# Patient Record
Sex: MALE | Race: WHITE | NOT HISPANIC OR LATINO | Employment: FULL TIME | URBAN - METROPOLITAN AREA
[De-identification: names, ages, dates, MRNs, and addresses within clinical notes are randomized per-mention and may not be internally consistent; named-entity substitution may affect disease eponyms.]

---

## 2018-01-17 ENCOUNTER — OFFICE VISIT (OUTPATIENT)
Dept: URGENT CARE | Facility: CLINIC | Age: 28
End: 2018-01-17
Payer: COMMERCIAL

## 2018-01-17 PROCEDURE — 99203 OFFICE O/P NEW LOW 30 MIN: CPT

## 2018-01-19 NOTE — PROGRESS NOTES
Assessment   1  Cellulitis of face (682 0) (L03 211)    Plan   Cellulitis of face    · Amoxicillin-Pot Clavulanate 875-125 MG Oral Tablet; TAKE 1 TABLET EVERY 12    HOURS UNTIL GONE    Discussion/Summary   Discussion Summary:    Take antibiotics as prescribed  apply Benadryl cream or take Benadryl tablets as needed for itching  application of Aquaphor as you have been doing  try applying thin layer or hydrocortisone cream if no improvement  up for new or worsening symptoms, particularly vision changes or spreading rash  Medication Side Effects Reviewed: Possible side effects of new medications were reviewed with the patient/guardian today  Understands and agrees with treatment plan: The treatment plan was reviewed with the patient/guardian  The patient/guardian understands and agrees with the treatment plan    Counseling Documentation With Imm: The patient was counseled regarding instructions for management,-- prognosis,-- patient and family education,-- impressions  Follow Up Instructions: Follow Up with your Primary Care Provider in 3-4 days  If your symptoms worsen, go to the nearest [place] [address] Emergency Department  Chief Complaint   1  Rash  Chief Complaint Free Text Note Form: Pt reports 4 days of a rash that started on his right ear  The rash spread to his left ear and face  He reports the rash itches  He has been applying lotion  History of Present Illness   HPI: Well appearing 23 yo male presents with complaint of rash on bilateral ears and face x 5 days  Pt reports it started on left ear with redness and swelling  Redness has spread across face and forehead, and his left ear is now involved  Pt denies any new soaps, shampoos, or lotions  Denies any change in environment or food  Pt complains of itching and soreness  Denies fever, chills, body aches, vision or hearing changes  Denies inner ear pain, but has had nasal and sinus congestion with scratchy throat   Pt reports some serous drainage after scratching ear  Hospital Based Practices Required Assessment:      Pain Assessment      the patient states they do not have pain  Abuse And Domestic Violence Screen   Domestic violence screen not done today  Reason DV Screen not done: family present       Depression And Suicide Screen  Suicide screen not done today  -- Reason suicide screen not done: family present  Prefered Language is  [place]  Primary Language is  English  Review of Systems   Focused-Male:      Constitutional: no fever or chills, feels well, no tiredness, no recent weight loss or weight gain  ENT: no complaints of earache, no loss of hearing, no nosebleeds or nasal discharge, no sore throat or hoarseness  Cardiovascular: no complaints of slow or fast heart rate, no chest pain, no palpitations, no leg claudication or lower extremity edema  Respiratory: no complaints of shortness of breath, no wheezing or cough, no dyspnea on exertion, no orthopnea or PND  Gastrointestinal: no complaints of abdominal pain, no constipation, no nausea or vomiting, no diarrhea or bloody stools  Musculoskeletal: no complaints of arthralgia, no myalgia, no joint swelling or stiffness, no limb pain or swelling  Integumentary: as noted in HPI  Neurological: no complaints of headache, no confusion, no numbness or tingling, no dizziness or fainting  Past Medical History   1  History of Denial (799 29) (R45 89)    Social History    · Smoker (305 1) (F17 200)    Current Meds    1  No Reported Medications Recorded    Allergies   1   No Known Drug Allergies    Vitals   Signs   Recorded: [id number] 06:56PM   Temperature: 98 9 F  Heart Rate: 90  Respiration: 18  Systolic: 635  Diastolic: 86  Height: 6 ft 2 in  Weight: 406 lb   BMI Calculated: 52 13  BSA Calculated: 2 94  O2 Saturation: 98    Physical Exam        Constitutional      General appearance: No acute distress, well appearing and well nourished  Eyes      Conjunctiva and lids: No swelling, erythema, or discharge  Pupils and irises: Equal, round and reactive to light  Ears, Nose, Mouth, and Throat      External inspection of ears and nose: Abnormal   The right external ear was erythematous, swollen and tender  The left external ear was erythematous, swollen and tender  The right mastoid was normal  The left mastoid was normal  External nose exam: normal      Otoscopic examination: Tympanic membrance translucent with normal light reflex  Canals patent without erythema  Nasal mucosa, septum, and turbinates: Normal without edema or erythema  Oropharynx: Normal with no erythema, edema, exudate or lesions  Pulmonary      Respiratory effort: No increased work of breathing or signs of respiratory distress  Auscultation of lungs: Clear to auscultation  Cardiovascular      Auscultation of heart: Normal rate and rhythm, normal S1 and S2, without murmurs  Skin      Skin and subcutaneous tissue: Normal without rashes or lesions  Examination of the skin for lesions: Abnormal  -- erythematous rash on forehead and face, stopping at beard        Signatures    Electronically signed by : [facility] ; Jan 17 2018  7:20PM EST                       (Author)     Electronically signed by : Leighton Hollingsworth DO; Jan 18 2018  6:43PM EST                       (Co-author)

## 2018-01-23 VITALS
WEIGHT: 315 LBS | HEART RATE: 90 BPM | OXYGEN SATURATION: 98 % | SYSTOLIC BLOOD PRESSURE: 140 MMHG | DIASTOLIC BLOOD PRESSURE: 86 MMHG | RESPIRATION RATE: 18 BRPM | HEIGHT: 74 IN | TEMPERATURE: 98.9 F | BODY MASS INDEX: 40.43 KG/M2

## 2018-12-14 ENCOUNTER — OFFICE VISIT (OUTPATIENT)
Dept: URGENT CARE | Facility: CLINIC | Age: 28
End: 2018-12-14
Payer: COMMERCIAL

## 2018-12-14 VITALS
SYSTOLIC BLOOD PRESSURE: 146 MMHG | WEIGHT: 315 LBS | RESPIRATION RATE: 16 BRPM | BODY MASS INDEX: 40.43 KG/M2 | HEIGHT: 74 IN | DIASTOLIC BLOOD PRESSURE: 82 MMHG | TEMPERATURE: 97 F | OXYGEN SATURATION: 94 % | HEART RATE: 98 BPM

## 2018-12-14 DIAGNOSIS — J20.9 ACUTE BRONCHITIS, UNSPECIFIED ORGANISM: Primary | ICD-10-CM

## 2018-12-14 PROCEDURE — 99213 OFFICE O/P EST LOW 20 MIN: CPT | Performed by: PREVENTIVE MEDICINE

## 2018-12-14 RX ORDER — ALBUTEROL SULFATE 90 UG/1
2 AEROSOL, METERED RESPIRATORY (INHALATION) EVERY 6 HOURS PRN
Qty: 18 G | Refills: 0 | Status: SHIPPED | OUTPATIENT
Start: 2018-12-14 | End: 2020-11-24 | Stop reason: ALTCHOICE

## 2018-12-14 RX ORDER — AZITHROMYCIN 250 MG/1
TABLET, FILM COATED ORAL
Qty: 6 TABLET | Refills: 0 | Status: SHIPPED | OUTPATIENT
Start: 2018-12-14 | End: 2018-12-18

## 2018-12-14 RX ORDER — PROMETHAZINE HYDROCHLORIDE AND CODEINE PHOSPHATE 6.25; 1 MG/5ML; MG/5ML
5 SYRUP ORAL EVERY 4 HOURS PRN
Qty: 120 ML | Refills: 0 | Status: SHIPPED | OUTPATIENT
Start: 2018-12-14 | End: 2020-11-24 | Stop reason: ALTCHOICE

## 2018-12-14 NOTE — PATIENT INSTRUCTIONS
Increase fluid intake  Motrin for fever chills or aches  You can supplement with DayQuil and NyQuil to help relieve symptoms  Codeine cough medicine at night  Recheck 3-5 days if no improvement

## 2018-12-14 NOTE — PROGRESS NOTES
[facility]        NAME: Padma Ferreira is a 34 y o  male  : 1990    MRN: [id number]  DATE: 2018  TIME: 5:53 PM    Assessment and Plan   Acute bronchitis, unspecified organism [J20 9]  1  Acute bronchitis, unspecified organism  azithromycin (ZITHROMAX) 250 mg tablet    albuterol (VENTOLIN HFA) 90 mcg/act inhaler    promethazine-codeine (PHENERGAN WITH CODEINE) 6 25-10 mg/5 mL syrup         Patient Instructions       Follow up with PCP in 3-5 days  Proceed to  ER if symptoms worsen  Chief Complaint     Chief Complaint   Patient presents with    Cough     Began 2-3 weeks ago  productive cough with pain  History of Present Illness       Two and half weeks of head cold type symptoms with increasing cough productive yellow-green sputum  Occasional wheezing  Note, he is a smoker  Review of Systems   Review of Systems   HENT: Positive for congestion  Respiratory: Positive for cough and wheezing  Current Medications       Current Outpatient Prescriptions:     albuterol (VENTOLIN HFA) 90 mcg/act inhaler, Inhale 2 puffs every 6 (six) hours as needed for wheezing, Disp: 18 g, Rfl: 0    azithromycin (ZITHROMAX) 250 mg tablet, Take 2 tablets today then 1 tablet daily x 4 days, Disp: 6 tablet, Rfl: 0    promethazine-codeine (PHENERGAN WITH CODEINE) 6 25-10 mg/5 mL syrup, Take 5 mL by mouth every 4 (four) hours as needed for cough, Disp: 120 mL, Rfl: 0    Current Allergies     Allergies as of 2018    (No Known Allergies)            The following portions of the patient's history were reviewed and updated as appropriate: allergies, current medications, past family history, past medical history, past social history, past surgical history and problem list      No past medical history on file  No past surgical history on file  No family history on file  Medications have been verified          Objective   /82   Pulse 98   Temp (!) 97 °F (36 1 °C)   Resp 16   Ht 6' 2" (1 88 m)   Wt (!) 184 kg (405 lb)   SpO2 94%   BMI 52 00 kg/m²        Physical Exam     Physical Exam   HENT:   Right Ear: External ear normal    Left Ear: External ear normal    Mouth/Throat: Oropharynx is clear and moist    Cardiovascular: Normal heart sounds  Pulmonary/Chest:   No rhonchi or rales but occasionally and inspiratory wheezes over the upper segments of the lower lobes  Lymphadenopathy:     He has no cervical adenopathy

## 2018-12-22 ENCOUNTER — OFFICE VISIT (OUTPATIENT)
Dept: URGENT CARE | Facility: CLINIC | Age: 28
End: 2018-12-22
Payer: COMMERCIAL

## 2018-12-22 VITALS
WEIGHT: 315 LBS | DIASTOLIC BLOOD PRESSURE: 82 MMHG | SYSTOLIC BLOOD PRESSURE: 146 MMHG | HEIGHT: 74 IN | RESPIRATION RATE: 16 BRPM | OXYGEN SATURATION: 93 % | TEMPERATURE: 97.1 F | HEART RATE: 110 BPM | BODY MASS INDEX: 40.43 KG/M2

## 2018-12-22 DIAGNOSIS — H66.91 RIGHT OTITIS MEDIA, UNSPECIFIED OTITIS MEDIA TYPE: Primary | ICD-10-CM

## 2018-12-22 PROCEDURE — 99213 OFFICE O/P EST LOW 20 MIN: CPT | Performed by: EMERGENCY MEDICINE

## 2018-12-22 RX ORDER — AMOXICILLIN 875 MG/1
875 TABLET, COATED ORAL 2 TIMES DAILY
Qty: 20 TABLET | Refills: 0 | Status: SHIPPED | OUTPATIENT
Start: 2018-12-22 | End: 2019-01-01

## 2018-12-22 NOTE — PROGRESS NOTES
Assessment/Plan:    No problem-specific Assessment & Plan notes found for this encounter  Diagnoses and all orders for this visit:    Right otitis media, unspecified otitis media type  -     amoxicillin (AMOXIL) 875 mg tablet; Take 1 tablet (875 mg total) by mouth 2 (two) times a day for 10 days          Subjective:      Patient ID: Bhavya Quinn is a 34 y o  male  Pt feels R ear is blocked; hearing dull      Earache    There is pain in the right ear  This is a new problem  The current episode started today  The problem occurs constantly  The problem has been unchanged  There has been no fever  The pain is at a severity of 3/10  The pain is mild  He has tried nothing for the symptoms  The treatment provided no relief  The following portions of the patient's history were reviewed and updated as appropriate: current medications, past family history, past medical history, past social history, past surgical history and problem list     Review of Systems   HENT: Positive for ear pain  All other systems reviewed and are negative  Objective:      /82   Pulse (!) 110   Temp (!) 97 1 °F (36 2 °C)   Resp 16   Ht 6' 2" (1 88 m)   Wt (!) 184 kg (405 lb)   SpO2 93%   BMI 52 00 kg/m²          Physical Exam   Constitutional: He is oriented to person, place, and time  He appears well-developed and well-nourished  HENT:   Left Ear: External ear normal    Nose: Nose normal    R TM red, swollen   Eyes: Pupils are equal, round, and reactive to light  Neck: Normal range of motion  Cardiovascular: Normal rate  Pulmonary/Chest: Effort normal    Abdominal: Soft  Neurological: He is alert and oriented to person, place, and time  Skin: Skin is warm and dry  Psychiatric: He has a normal mood and affect  His behavior is normal  Judgment and thought content normal    Nursing note and vitals reviewed

## 2018-12-22 NOTE — PATIENT INSTRUCTIONS
Amoxicillin twice a day, Tylenol or ibuprofen for pain or fever, recheck 10 days or sooner if necessary

## 2020-11-24 ENCOUNTER — OFFICE VISIT (OUTPATIENT)
Dept: FAMILY MEDICINE CLINIC | Facility: CLINIC | Age: 30
End: 2020-11-24
Payer: COMMERCIAL

## 2020-11-24 VITALS
SYSTOLIC BLOOD PRESSURE: 140 MMHG | HEIGHT: 74 IN | RESPIRATION RATE: 20 BRPM | HEART RATE: 96 BPM | TEMPERATURE: 97.1 F | OXYGEN SATURATION: 94 % | DIASTOLIC BLOOD PRESSURE: 90 MMHG | WEIGHT: 315 LBS | BODY MASS INDEX: 40.43 KG/M2

## 2020-11-24 DIAGNOSIS — E66.01 CLASS 3 SEVERE OBESITY DUE TO EXCESS CALORIES WITHOUT SERIOUS COMORBIDITY WITH BODY MASS INDEX (BMI) OF 50.0 TO 59.9 IN ADULT (HCC): ICD-10-CM

## 2020-11-24 DIAGNOSIS — E66.01 MORBID OBESITY WITH BMI OF 50.0-59.9, ADULT (HCC): ICD-10-CM

## 2020-11-24 DIAGNOSIS — Z13.6 SCREENING FOR CARDIOVASCULAR CONDITION: ICD-10-CM

## 2020-11-24 DIAGNOSIS — Z11.4 ENCOUNTER FOR SCREENING FOR HIV: ICD-10-CM

## 2020-11-24 DIAGNOSIS — R63.1 INCREASED THIRST: ICD-10-CM

## 2020-11-24 DIAGNOSIS — Z00.00 ANNUAL PHYSICAL EXAM: Primary | ICD-10-CM

## 2020-11-24 PROCEDURE — 3725F SCREEN DEPRESSION PERFORMED: CPT | Performed by: NURSE PRACTITIONER

## 2020-11-24 PROCEDURE — 4004F PT TOBACCO SCREEN RCVD TLK: CPT | Performed by: NURSE PRACTITIONER

## 2020-11-24 PROCEDURE — 99385 PREV VISIT NEW AGE 18-39: CPT | Performed by: NURSE PRACTITIONER

## 2020-11-24 PROCEDURE — 3008F BODY MASS INDEX DOCD: CPT | Performed by: NURSE PRACTITIONER

## 2020-12-11 LAB
ALBUMIN SERPL-MCNC: 4.5 G/DL (ref 4.1–5.2)
ALBUMIN/GLOB SERPL: 1.7 {RATIO} (ref 1.2–2.2)
ALP SERPL-CCNC: 89 IU/L (ref 39–117)
ALT SERPL-CCNC: 55 IU/L (ref 0–44)
APPEARANCE UR: CLEAR
AST SERPL-CCNC: 28 IU/L (ref 0–40)
BACTERIA URNS QL MICRO: NORMAL
BASOPHILS # BLD AUTO: 0 X10E3/UL (ref 0–0.2)
BASOPHILS NFR BLD AUTO: 1 %
BILIRUB SERPL-MCNC: 0.5 MG/DL (ref 0–1.2)
BILIRUB UR QL STRIP: NEGATIVE
BUN SERPL-MCNC: 17 MG/DL (ref 6–20)
BUN/CREAT SERPL: 17 (ref 9–20)
CALCIUM SERPL-MCNC: 9.5 MG/DL (ref 8.7–10.2)
CHLORIDE SERPL-SCNC: 97 MMOL/L (ref 96–106)
CHOLEST SERPL-MCNC: 206 MG/DL (ref 100–199)
CHOLEST/HDLC SERPL: 9 RATIO (ref 0–5)
CO2 SERPL-SCNC: 24 MMOL/L (ref 20–29)
COLOR UR: YELLOW
CREAT SERPL-MCNC: 0.98 MG/DL (ref 0.76–1.27)
EOSINOPHIL # BLD AUTO: 0.1 X10E3/UL (ref 0–0.4)
EOSINOPHIL NFR BLD AUTO: 2 %
EPI CELLS #/AREA URNS HPF: NORMAL /HPF (ref 0–10)
ERYTHROCYTE [DISTWIDTH] IN BLOOD BY AUTOMATED COUNT: 13.5 % (ref 11.6–15.4)
EST. AVERAGE GLUCOSE BLD GHB EST-MCNC: 329 MG/DL
GLOBULIN SER-MCNC: 2.6 G/DL (ref 1.5–4.5)
GLUCOSE SERPL-MCNC: 344 MG/DL (ref 65–99)
GLUCOSE UR QL: ABNORMAL
HBA1C MFR BLD: 13.1 % (ref 4.8–5.6)
HCT VFR BLD AUTO: 49.4 % (ref 37.5–51)
HDLC SERPL-MCNC: 23 MG/DL
HGB BLD-MCNC: 16.6 G/DL (ref 13–17.7)
HGB UR QL STRIP: NEGATIVE
HIV 1+2 AB+HIV1 P24 AG SERPL QL IA: NON REACTIVE
IMM GRANULOCYTES # BLD: 0.1 X10E3/UL (ref 0–0.1)
IMM GRANULOCYTES NFR BLD: 1 %
KETONES UR QL STRIP: ABNORMAL
LDLC SERPL CALC-MCNC: 78 MG/DL (ref 0–99)
LDLC SERPL DIRECT ASSAY-MCNC: 83 MG/DL (ref 0–99)
LEUKOCYTE ESTERASE UR QL STRIP: NEGATIVE
LYMPHOCYTES # BLD AUTO: 2 X10E3/UL (ref 0.7–3.1)
LYMPHOCYTES NFR BLD AUTO: 32 %
MCH RBC QN AUTO: 27.8 PG (ref 26.6–33)
MCHC RBC AUTO-ENTMCNC: 33.6 G/DL (ref 31.5–35.7)
MCV RBC AUTO: 83 FL (ref 79–97)
MICRO URNS: ABNORMAL
MONOCYTES # BLD AUTO: 0.4 X10E3/UL (ref 0.1–0.9)
MONOCYTES NFR BLD AUTO: 7 %
MUCOUS THREADS URNS QL MICRO: PRESENT
NEUTROPHILS # BLD AUTO: 3.8 X10E3/UL (ref 1.4–7)
NEUTROPHILS NFR BLD AUTO: 57 %
NITRITE UR QL STRIP: NEGATIVE
PH UR STRIP: 5 [PH] (ref 5–7.5)
PLATELET # BLD AUTO: 192 X10E3/UL (ref 150–450)
POTASSIUM SERPL-SCNC: 4.1 MMOL/L (ref 3.5–5.2)
PROT SERPL-MCNC: 7.1 G/DL (ref 6–8.5)
PROT UR QL STRIP: ABNORMAL
RBC # BLD AUTO: 5.97 X10E6/UL (ref 4.14–5.8)
RBC #/AREA URNS HPF: NORMAL /HPF (ref 0–2)
SL AMB EGFR AFRICAN AMERICAN: 121 ML/MIN/1.73
SL AMB EGFR NON AFRICAN AMERICAN: 104 ML/MIN/1.73
SL AMB VLDL CHOLESTEROL CALC: 105 MG/DL (ref 5–40)
SODIUM SERPL-SCNC: 136 MMOL/L (ref 134–144)
SP GR UR: >=1.03 (ref 1–1.03)
TRIGL SERPL-MCNC: 658 MG/DL (ref 0–149)
TSH SERPL DL<=0.005 MIU/L-ACNC: 1.34 UIU/ML (ref 0.45–4.5)
UROBILINOGEN UR STRIP-ACNC: 0.2 MG/DL (ref 0.2–1)
WBC # BLD AUTO: 6.4 X10E3/UL (ref 3.4–10.8)
WBC #/AREA URNS HPF: NORMAL /HPF (ref 0–5)

## 2020-12-11 PROCEDURE — 3046F HEMOGLOBIN A1C LEVEL >9.0%: CPT | Performed by: NURSE PRACTITIONER

## 2020-12-15 ENCOUNTER — OFFICE VISIT (OUTPATIENT)
Dept: FAMILY MEDICINE CLINIC | Facility: CLINIC | Age: 30
End: 2020-12-15
Payer: COMMERCIAL

## 2020-12-15 VITALS
OXYGEN SATURATION: 95 % | HEART RATE: 94 BPM | DIASTOLIC BLOOD PRESSURE: 98 MMHG | SYSTOLIC BLOOD PRESSURE: 150 MMHG | TEMPERATURE: 97 F

## 2020-12-15 DIAGNOSIS — I10 ESSENTIAL HYPERTENSION: ICD-10-CM

## 2020-12-15 DIAGNOSIS — E11.9 TYPE 2 DIABETES MELLITUS WITHOUT COMPLICATION, WITHOUT LONG-TERM CURRENT USE OF INSULIN (HCC): Primary | ICD-10-CM

## 2020-12-15 PROCEDURE — 3077F SYST BP >= 140 MM HG: CPT | Performed by: NURSE PRACTITIONER

## 2020-12-15 PROCEDURE — 4010F ACE/ARB THERAPY RXD/TAKEN: CPT | Performed by: NURSE PRACTITIONER

## 2020-12-15 PROCEDURE — 3080F DIAST BP >= 90 MM HG: CPT | Performed by: NURSE PRACTITIONER

## 2020-12-15 PROCEDURE — 99213 OFFICE O/P EST LOW 20 MIN: CPT | Performed by: NURSE PRACTITIONER

## 2020-12-15 PROCEDURE — 4004F PT TOBACCO SCREEN RCVD TLK: CPT | Performed by: NURSE PRACTITIONER

## 2020-12-15 RX ORDER — VALSARTAN 80 MG/1
80 TABLET ORAL DAILY
Qty: 30 TABLET | Refills: 1 | Status: SHIPPED | OUTPATIENT
Start: 2020-12-15 | End: 2020-12-29

## 2020-12-16 DIAGNOSIS — E11.9 TYPE 2 DIABETES MELLITUS WITHOUT COMPLICATION, WITHOUT LONG-TERM CURRENT USE OF INSULIN (HCC): Primary | ICD-10-CM

## 2020-12-16 DIAGNOSIS — E11.9 DIABETES MELLITUS, NEW ONSET (HCC): Primary | ICD-10-CM

## 2020-12-16 RX ORDER — LANCETS 33 GAUGE
EACH MISCELLANEOUS DAILY
Qty: 100 EACH | Refills: 3 | Status: SHIPPED | OUTPATIENT
Start: 2020-12-16 | End: 2021-01-11 | Stop reason: SDUPTHER

## 2020-12-16 RX ORDER — BLOOD SUGAR DIAGNOSTIC
1 STRIP MISCELLANEOUS DAILY
Qty: 100 EACH | Refills: 3 | Status: SHIPPED | OUTPATIENT
Start: 2020-12-16 | End: 2021-07-02 | Stop reason: SDUPTHER

## 2020-12-17 ENCOUNTER — PATIENT OUTREACH (OUTPATIENT)
Dept: FAMILY MEDICINE CLINIC | Facility: CLINIC | Age: 30
End: 2020-12-17

## 2020-12-29 ENCOUNTER — TELEPHONE (OUTPATIENT)
Dept: FAMILY MEDICINE CLINIC | Facility: CLINIC | Age: 30
End: 2020-12-29

## 2020-12-29 ENCOUNTER — OFFICE VISIT (OUTPATIENT)
Dept: FAMILY MEDICINE CLINIC | Facility: CLINIC | Age: 30
End: 2020-12-29
Payer: COMMERCIAL

## 2020-12-29 VITALS
OXYGEN SATURATION: 95 % | RESPIRATION RATE: 20 BRPM | SYSTOLIC BLOOD PRESSURE: 130 MMHG | DIASTOLIC BLOOD PRESSURE: 90 MMHG | TEMPERATURE: 97 F | HEIGHT: 74 IN | BODY MASS INDEX: 40.43 KG/M2 | HEART RATE: 103 BPM | WEIGHT: 315 LBS

## 2020-12-29 DIAGNOSIS — I10 ESSENTIAL HYPERTENSION: Primary | ICD-10-CM

## 2020-12-29 DIAGNOSIS — E11.9 TYPE 2 DIABETES MELLITUS WITHOUT COMPLICATION, WITHOUT LONG-TERM CURRENT USE OF INSULIN (HCC): ICD-10-CM

## 2020-12-29 DIAGNOSIS — E78.2 MIXED HYPERLIPIDEMIA: ICD-10-CM

## 2020-12-29 DIAGNOSIS — E11.9 TYPE 2 DIABETES MELLITUS WITHOUT COMPLICATION, WITHOUT LONG-TERM CURRENT USE OF INSULIN (HCC): Primary | ICD-10-CM

## 2020-12-29 PROCEDURE — 4004F PT TOBACCO SCREEN RCVD TLK: CPT | Performed by: NURSE PRACTITIONER

## 2020-12-29 PROCEDURE — 3008F BODY MASS INDEX DOCD: CPT | Performed by: NURSE PRACTITIONER

## 2020-12-29 PROCEDURE — 99214 OFFICE O/P EST MOD 30 MIN: CPT | Performed by: NURSE PRACTITIONER

## 2020-12-29 PROCEDURE — 4010F ACE/ARB THERAPY RXD/TAKEN: CPT | Performed by: NURSE PRACTITIONER

## 2020-12-29 PROCEDURE — 3725F SCREEN DEPRESSION PERFORMED: CPT | Performed by: NURSE PRACTITIONER

## 2020-12-29 PROCEDURE — 3075F SYST BP GE 130 - 139MM HG: CPT | Performed by: NURSE PRACTITIONER

## 2020-12-29 PROCEDURE — 3080F DIAST BP >= 90 MM HG: CPT | Performed by: NURSE PRACTITIONER

## 2020-12-29 RX ORDER — VALSARTAN 160 MG/1
160 TABLET ORAL DAILY
Qty: 30 TABLET | Refills: 2 | Status: SHIPPED | OUTPATIENT
Start: 2020-12-29 | End: 2021-03-08 | Stop reason: SDUPTHER

## 2020-12-29 RX ORDER — ATORVASTATIN CALCIUM 20 MG/1
20 TABLET, FILM COATED ORAL DAILY
Qty: 30 TABLET | Refills: 2 | Status: SHIPPED | OUTPATIENT
Start: 2020-12-29 | End: 2021-03-08 | Stop reason: SDUPTHER

## 2021-01-11 ENCOUNTER — CONSULT (OUTPATIENT)
Dept: ENDOCRINOLOGY | Facility: HOSPITAL | Age: 31
End: 2021-01-11
Payer: COMMERCIAL

## 2021-01-11 ENCOUNTER — PATIENT OUTREACH (OUTPATIENT)
Dept: FAMILY MEDICINE CLINIC | Facility: CLINIC | Age: 31
End: 2021-01-11

## 2021-01-11 VITALS
HEIGHT: 74 IN | SYSTOLIC BLOOD PRESSURE: 122 MMHG | BODY MASS INDEX: 40.43 KG/M2 | WEIGHT: 315 LBS | HEART RATE: 92 BPM | DIASTOLIC BLOOD PRESSURE: 74 MMHG

## 2021-01-11 DIAGNOSIS — E11.9 TYPE 2 DIABETES MELLITUS WITHOUT COMPLICATION, WITHOUT LONG-TERM CURRENT USE OF INSULIN (HCC): ICD-10-CM

## 2021-01-11 DIAGNOSIS — E11.65 TYPE 2 DIABETES MELLITUS WITH HYPERGLYCEMIA, WITHOUT LONG-TERM CURRENT USE OF INSULIN (HCC): Primary | ICD-10-CM

## 2021-01-11 PROBLEM — E78.2 MIXED HYPERLIPIDEMIA: Status: ACTIVE | Noted: 2021-01-11

## 2021-01-11 PROBLEM — I10 ESSENTIAL HYPERTENSION: Status: ACTIVE | Noted: 2021-01-11

## 2021-01-11 PROCEDURE — 4004F PT TOBACCO SCREEN RCVD TLK: CPT | Performed by: INTERNAL MEDICINE

## 2021-01-11 PROCEDURE — 3008F BODY MASS INDEX DOCD: CPT | Performed by: INTERNAL MEDICINE

## 2021-01-11 PROCEDURE — 99205 OFFICE O/P NEW HI 60 MIN: CPT | Performed by: INTERNAL MEDICINE

## 2021-01-11 PROCEDURE — 3078F DIAST BP <80 MM HG: CPT | Performed by: INTERNAL MEDICINE

## 2021-01-11 PROCEDURE — 3074F SYST BP LT 130 MM HG: CPT | Performed by: INTERNAL MEDICINE

## 2021-01-11 RX ORDER — BLOOD-GLUCOSE METER
EACH MISCELLANEOUS
Qty: 1 EACH | Refills: 0 | Status: SHIPPED | OUTPATIENT
Start: 2021-01-11

## 2021-01-11 RX ORDER — LANCETS 33 GAUGE
EACH MISCELLANEOUS DAILY
Qty: 100 EACH | Refills: 3 | Status: SHIPPED | OUTPATIENT
Start: 2021-01-11

## 2021-01-11 NOTE — PROGRESS NOTES
1/11/2021    Assessment/Plan      Diagnoses and all orders for this visit:    Type 2 diabetes mellitus with hyperglycemia, without long-term current use of insulin ([facility])  -     Blood Glucose Monitoring Suppl (ONE TOUCH ULTRA 2) w/Device KIT; Use to test blood sugars twice a day  -     Ambulatory referral to Diabetic Education; Future  -     HEMOGLOBIN A1C W/ EAG ESTIMATION Lab Collect; Future  -     Comprehensive metabolic panel Lab Collect; Future  -     Microalbumin / creatinine urine ratio Lab Collect; Future    Type 2 diabetes mellitus without complication, without long-term current use of insulin [facility]  -     Ambulatory referral to Endocrinology  -     OneTouch Delica Lancets 83L MISC; Use daily  -     HEMOGLOBIN A1C W/ EAG ESTIMATION Lab Collect; Future  -     Comprehensive metabolic panel Lab Collect; Future  -     Microalbumin / creatinine urine ratio Lab Collect; Future        Assessment/Plan:  1  Type 2 diabetes  He is recently diagnosed with diabetes, likely type 2  His hemoglobin A1c is notable for markedly uncontrolled type 2 diabetes at 13 1%  At this point, he has just started metformin therapy and his symptomatology is improving  I will allow him to go up to metformin 1000 mg twice a day to see if that will improve his symptoms  I have asked him to start checking his blood sugars twice a day before and 2 hours after meal and to vary the meals and call those into the office within 3 weeks after his metformin dosages at maximum  I have asked him to make an appoint with diabetic Education for medical nutrition therapy and Diabetes Education as he is in new diabetic  I discussed with him that he will need ophthalmologic follow-up when his blood sugars are down  I spent a long time discussing with him the importance of dietary changes and slow weight loss on his long-term outlook with diabetes  2  Hypertension  Blood pressure is under good control on his current dose of valsartan      3  Hyperlipidemia  He does have marked hypertriglyceridemia which could be partly due to his elevated blood sugars  Hopefully, this triglyceride elevation will improve over time as his blood sugars improved  He will continue the same atorvastatin 20 mg daily  I have asked him to follow up in 3 months with preceding hemoglobin A1c, CMP, and urine microalbumin to creatinine ratio  CC: Diabetes Consult    History of Present Illness     HPI: Helio Mejia is a 31y o  year old male with type 2 diabetes for 1 month, hyperlipidemia, hypertension for evaluation/consult  He was diagnosed about 1 month ago, he was thirsty and had polyuria  He was started on metformin  He is on oral agents at home and takes metformin 500 mg 2:00 a m  and 1:00 p m  He denies any polyphagia, and blurry vision  He has significant polyuria and nocturia every hour and polydipsia which are improving with the use of metformin  He now has only occasional nocturia  He denies numbness or tingling of the feet  He denies chest pain or shortness of breath  He denies neuropathy, nephropathy, retinopathy, heart attack, stroke and claudication but does admit to none  He did drop 40 lb prior to the diagnosis of diabetes  Hypoglycemic episodes: No never  H/o of hypoglycemia causing hospitalization or intervention such as glucagon injection  or ambulance call  No   Hypoglycemia symptoms: never had one  Treatment of hypoglycemia: would eat food  Glucagon:No   Medic alert tag: recommended,Yes  The patient's last eye exam was not yet  The patient's last foot exam was never to one  Last A1C was   Lab Results   Component Value Date    HGBA1C 13 1 (H) 12/10/2020     Blood Sugar/Glucometer/Pump/CGM review: does not yet have a glucometer  Has not been able to get it for the davonte  He has hyperlipidemia and takes atorvastatin 20 mg daily  He denies chest pain or shortness of breath      He has hypertension and takes valsartan 160 mg daily  He denies headache or stroke-like symptoms  Review of Systems   Constitutional: Positive for fatigue  Negative for unexpected weight change  Some fatigue  He has dropped 40 lbs within the last 3 months  Weight stable so far  HENT: Negative for hearing loss, tinnitus and trouble swallowing  Eyes: Negative for visual disturbance  Respiratory: Negative for chest tightness and shortness of breath  Cardiovascular: Negative for chest pain, palpitations and leg swelling  Gastrointestinal: Positive for diarrhea  Negative for abdominal pain, constipation and nausea  Diarrhea at times  Endocrine: Positive for polydipsia and polyuria  Negative for polyphagia  Polyuria much better  Nocturia occasionally, was every hour  Thirst is better  Musculoskeletal: Positive for back pain  Negative for arthralgias  Low back pain unchanged for years  Skin: Negative for wound  Neurological: Negative for dizziness, tremors, weakness, light-headedness, numbness and headaches  Psychiatric/Behavioral: Negative for dysphoric mood and sleep disturbance  The patient is not nervous/anxious          Historical Information   Past Medical History:   Diagnosis Date    GERD (gastroesophageal reflux disease)     Herniated lumbar intervertebral disc      Past Surgical History:   Procedure Laterality Date    ESOPHAGUS SURGERY      EGD with blood vessel clipping     Social History   Social History     Substance and Sexual Activity   Alcohol Use Yes    Frequency: Monthly or less    Drinks per session: 7 to 9    Binge frequency: Monthly     Social History     Substance and Sexual Activity   Drug Use Not Currently     Social History     Tobacco Use   Smoking Status Current Every Day Smoker    Packs/day: 1 50    Types: Cigarettes   Smokeless Tobacco Former User    Types: Chew     Family History:   Family History   Problem Relation Age of Onset    Alcohol abuse Father     Cirrhosis Father     Hypertension Sister     No Known Problems Son     No Known Problems Son        Meds/Allergies   Current Outpatient Medications   Medication Sig Dispense Refill    atorvastatin (LIPITOR) 20 mg tablet Take 1 tablet (20 mg total) by mouth daily 30 tablet 2    glucose blood (OneTouch Ultra) test strip Use 1 each daily Use as instructed 100 each 3    metFORMIN (GLUCOPHAGE) 500 mg tablet Take 2 tablets (1,000 mg total) by mouth 2 (two) times a day with meals (Patient taking differently: Take 1,000 mg by mouth 2 (two) times a day with meals 2 tabs AM and 1 PM) 60 tablet 1    OneTouch Delica Lancets 28W MISC Use daily 100 each 3    valsartan (DIOVAN) 160 mg tablet Take 1 tablet (160 mg total) by mouth daily 30 tablet 2    Blood Glucose Monitoring Suppl (ONE TOUCH ULTRA 2) w/Device KIT Use to test blood sugars twice a day 1 each 0     No current facility-administered medications for this visit  No Known Allergies    Objective   Vitals: Blood pressure 122/74, pulse 92, height 6' 2" (1 88 m), weight (!) 174 kg (383 lb 6 4 oz)  Invasive Devices     None                 Physical Exam  Vitals signs reviewed  Constitutional:       Appearance: Normal appearance  He is well-developed  He is obese  HENT:      Head: Normocephalic and atraumatic  Eyes:      Extraocular Movements: Extraocular movements intact  Conjunctiva/sclera: Conjunctivae normal       Comments: No lid lag, stare, proptosis, or periorbital edema  Neck:      Musculoskeletal: Normal range of motion and neck supple  Thyroid: No thyromegaly  Vascular: No carotid bruit  Comments: Thyroid normal in size  No palpable thyroid nodules  No bruits over the thyroid gland  Cardiovascular:      Rate and Rhythm: Normal rate and regular rhythm  Pulses: Normal pulses  no weak pulses          Dorsalis pedis pulses are 2+ on the right side and 2+ on the left side          Posterior tibial pulses are 2+ on the right side and 2+ on the left side  Heart sounds: Normal heart sounds  No murmur  Pulmonary:      Effort: Pulmonary effort is normal       Breath sounds: Normal breath sounds  No wheezing  Abdominal:      General: Bowel sounds are normal       Palpations: Abdomen is soft  Tenderness: There is no abdominal tenderness  Musculoskeletal: Normal range of motion  General: No deformity  Right lower leg: No edema  Left lower leg: No edema  Comments: Callus medial 1st toe bilateral   No ulcerations of the feet  No tremor of the outstretched hands  No spinous process tenderness  No CVA tenderness  Feet:      Right foot:      Skin integrity: Callus present  No ulcer, skin breakdown, erythema, warmth or dry skin  Left foot:      Skin integrity: Callus present  No ulcer, skin breakdown, erythema, warmth or dry skin  Lymphadenopathy:      Cervical: No cervical adenopathy  Skin:     General: Skin is warm and dry  Findings: No erythema or rash  Neurological:      Mental Status: He is alert and oriented to person, place, and time  Deep Tendon Reflexes: Reflexes are normal and symmetric  Comments: Vibration sensation intact to the 1st toe DIP joint bilaterally  Microfilament sensation intact bilaterally  Deep tendon reflexes normal      Patient's shoes and socks removed  Right Foot/Ankle   Right Foot Inspection  Skin Exam: skin normal, skin intact, callus and callus no dry skin, no warmth, no erythema, no maceration, no abnormal color, no pre-ulcer and no ulcer                          Toe Exam: ROM and strength within normal limitsno swelling and  no right toe deformity  Sensory   Vibration: intact    Monofilament testing: intact  Vascular  Capillary refills: < 3 seconds  The right DP pulse is 2+  The right PT pulse is 2+       Left Foot/Ankle  Left Foot Inspection  Skin Exam: skin normal, skin intact and callusno dry skin, no warmth, no erythema, no maceration, normal color, no pre-ulcer and no ulcer                         Toe Exam: ROM and strength within normal limitsno swelling and no left toe deformity                   Sensory   Vibration: intact    Monofilament: intact  Vascular  Capillary refills: < 3 seconds  The left DP pulse is 2+  The left PT pulse is 2+  Assign Risk Category:  No deformity present; No loss of protective sensation; No weak pulses       Risk: 0      The history was obtained from the review of the chart and from the patient  Lab Results:    Most recent Alc is  Lab Results   Component Value Date    HGBA1C 13 1 (H) 12/10/2020           Blood work done on 12/10/2020 showed a CMP with a glucose of 344 fasting and then a LT of 55 but was otherwise normal   Lab Results   Component Value Date    CREATININE 0 98 12/10/2020    BUN 17 12/10/2020    K 4 1 12/10/2020    CL 97 12/10/2020    CO2 24 12/10/2020     Total cholesterol was 206, LDL cholesterol direct was 83     Lab Results   Component Value Date    HDL 23 (L) 12/10/2020    TRIG 658 (HH) 12/10/2020    CHOLHDL 9 0 (H) 12/10/2020     Lab Results   Component Value Date    ALT 55 (H) 12/10/2020    AST 28 12/10/2020     Lab Results   Component Value Date    TSH 1 340 12/10/2020     CBC is normal         Future Appointments   Date Time Provider Ijeoma Pappas   1/20/2021  4:15 PM Lani Redman RD DIAB ED [facility]   4/22/2021  5:15 PM LEEANN Zapata ENDO [facility]

## 2021-01-11 NOTE — PROGRESS NOTES
Outpatient Care Management Note:    Care manager called Alejandra Sandra  I introduced myself and my role as a care manager  He is scheduled to see endocrine today  Miah's last [id number]=70 1  Alejandra Chimevans states that he still does not have a glucometer  I instructed him to talk with endocrine about the difficulties he has had getting the glucometer  We discussed diabetic education and how helpful the classes can be in improving diabetic numbers  Alejandra Westfallevans agreed to [facility] following up in 7-10 days to see how he is doing  At that point, he will consider if he is interested in ongoing outreach

## 2021-01-11 NOTE — PATIENT INSTRUCTIONS
hgba1c is 13 1%  You do have diabetes  Work on going up to the metformin 1000 mg twice a day as the stomach allows  Check blood sugar twice a day, before and 2 hour after a meal and vary the meals  diabetes education for nutrition and being a new diabetic  Treadmill at least 15-20 min 3-4 days a week consistently  Follow up in 3 months with blood work

## 2021-01-21 ENCOUNTER — PATIENT OUTREACH (OUTPATIENT)
Dept: FAMILY MEDICINE CLINIC | Facility: CLINIC | Age: 31
End: 2021-01-21

## 2021-01-21 NOTE — PROGRESS NOTES
Outpatient Care Management Note:    Voice mail message left for Sobeida Martinez, with my contact information, requesting a call back

## 2021-01-28 ENCOUNTER — PATIENT OUTREACH (OUTPATIENT)
Dept: FAMILY MEDICINE CLINIC | Facility: CLINIC | Age: 31
End: 2021-01-28

## 2021-01-28 NOTE — PROGRESS NOTES
Outpatient Care Management Note:    Care manager called Alejandra Sandra  He saw endocrine  He is gradually increasing his metformin up to 1000mg twice a day  Currently he is taking 1500 mg per day instead of the 2000mg per day  He knows to take the metformin with food to prevent GI symptoms  Alejandra Sandra has a glucometer now but has not started using it  He watched videos on line but is hesitant to use it  CM offered to meet with him to review glucometer use  He declined doing this presently  CM will follow up with him next week to see if he is checking his blood sugars  We did discuss that endocrine requested he call his sugars into them in 3 weeks  Alejandra Sandra now has a treadmill  We talked about the benefits of exercise on blood sugars  He will try to use it 3 times per week  Alejandra Sandra has my contact information and will call with any questions

## 2021-02-04 ENCOUNTER — PATIENT OUTREACH (OUTPATIENT)
Dept: FAMILY MEDICINE CLINIC | Facility: CLINIC | Age: 31
End: 2021-02-04

## 2021-02-04 NOTE — PROGRESS NOTES
Outpatient Care Management Note:    Care manager called Marlin Webb  He was working and unable to talk  He requested CM call back tomorrow in the late afternoon

## 2021-02-05 ENCOUNTER — PATIENT OUTREACH (OUTPATIENT)
Dept: FAMILY MEDICINE CLINIC | Facility: CLINIC | Age: 31
End: 2021-02-05

## 2021-02-05 NOTE — PROGRESS NOTES
Outpatient Care Management Note:    Voice mail message left for Parth Chester, with my contact information, requesting a call back

## 2021-02-11 ENCOUNTER — PATIENT OUTREACH (OUTPATIENT)
Dept: FAMILY MEDICINE CLINIC | Facility: CLINIC | Age: 31
End: 2021-02-11

## 2021-02-11 NOTE — PROGRESS NOTES
Outpatient Care Management Note:    Care manager called Emmet Cockayne  He only checked his blood sugar once after my last conversation with him  It was 263  I strongly encouraged him to check his blood sugar daily at different times, so he can see how his diet affects his sugars  Emmet Cockayne states that it took him many times to actually get his blood sugar  His girlfriend assisted him and then he was able to get a reading  Cm again offered to meet with Emmet Cockayne to practice using his glucometer, but he declined  We reviewed the health risks associated with uncontrolled diabetes  We discussed that he may need increased medications or insulin in the future if he does not work to manage his sugars  Emmet Cockayne verbalized understanding and noted that he needs to make it a priority  We discussed his diet  He states that he is trying to improve his diet  He was drinking a lot of milk, juice and soda  He is trying to cut down  I reinforced that he should not be drinking sugary drinks  I encouraged him to try unsweetened iced tea or diet drinks as a substitute  He states he is drinking at least one soda per day  Emmet Cockayne is not exercising  We discussed setting goals that are attainable  I suggested her try using his treadmill once a week for 30 minutes  He notes that 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

## 2021-02-26 ENCOUNTER — PATIENT OUTREACH (OUTPATIENT)
Dept: FAMILY MEDICINE CLINIC | Facility: CLINIC | Age: 31
End: 2021-02-26

## 2021-02-26 NOTE — PROGRESS NOTES
Outpatient Care Management Note:    Voice mail message left for Dina Hubbard, with my contact information, requesting a call back

## 2021-03-04 ENCOUNTER — PATIENT OUTREACH (OUTPATIENT)
Dept: FAMILY MEDICINE CLINIC | Facility: CLINIC | Age: 31
End: 2021-03-04

## 2021-03-04 DIAGNOSIS — E11.9 TYPE 2 DIABETES MELLITUS WITHOUT COMPLICATION, WITHOUT LONG-TERM CURRENT USE OF INSULIN (HCC): ICD-10-CM

## 2021-03-04 NOTE — PROGRESS NOTES
Outpatient Care Management Note:    Voice mail message left for Gal Dye, with my contact information, requesting a call back  (2nd attempt)    Voice mail received from Gal Dye returning my call  CM called Miah back  We discussed him scheduling diabetic education  Gal Dye states that he did talk with them, but their was an issue with his insurance  I encouraged him to call them back to determine if the classes are covered  I gave him the contact information  Gal Dye is not checking his blood sugars regularly  He did check it 2 days ago  His sugar was 225 after work and 300 something after eating  We reviewed that these numbers are high  Gal Dye states he is taking his medications as ordered and not missing doses  We reviewed the plate method of meal planning and talked about various foods high in carbohydrates  Gal Dye does not eat regular meals  He gets up around 4 am and does not eat until noon  He states he never ate breakfast   He eats a couple pretzels when he takes his morning medications  I reviewed that pretzels are carbohydrates and change to sugar  We discussed adding protein  Gal Dye has been working to decrease his soda intake  He now drinks half a soda per day  Then he drinks mostly water and milk  I reviewed that milk also has sugar in it and may be increasing his readings  CM again recommended that he attend diabetic education  CM attempted to complete initial assessment  Gal Dye was driving and I did not recommend he talk and drive  He agreed to me calling back another day  Gal Dye agreed to calling for his PCP follow up on his blood pressure  CM recommended he check his blood sugar daily, so he can track how he is doing  We again discussed the risks of uncontrolled diabetes  CM mailed Diabetes and Nutrition;  Low Blood Sugar Symptoms and How to treat it; Building a Balanced Meal; Hemoglobin A1C; High Blood Sugar Signs and Symptoms; Diabetes and Exercise; Meal Planning with the Plate Method, Foot Care for Diabetics

## 2021-03-08 DIAGNOSIS — E78.2 MIXED HYPERLIPIDEMIA: ICD-10-CM

## 2021-03-08 DIAGNOSIS — E11.9 TYPE 2 DIABETES MELLITUS WITHOUT COMPLICATION, WITHOUT LONG-TERM CURRENT USE OF INSULIN (HCC): ICD-10-CM

## 2021-03-08 DIAGNOSIS — I10 ESSENTIAL HYPERTENSION: ICD-10-CM

## 2021-03-08 PROCEDURE — 4010F ACE/ARB THERAPY RXD/TAKEN: CPT | Performed by: INTERNAL MEDICINE

## 2021-03-08 RX ORDER — ATORVASTATIN CALCIUM 20 MG/1
20 TABLET, FILM COATED ORAL DAILY
Qty: 30 TABLET | Refills: 2 | Status: SHIPPED | OUTPATIENT
Start: 2021-03-08 | End: 2021-05-20 | Stop reason: SDUPTHER

## 2021-03-08 RX ORDER — VALSARTAN 160 MG/1
160 TABLET ORAL DAILY
Qty: 30 TABLET | Refills: 2 | Status: SHIPPED | OUTPATIENT
Start: 2021-03-08 | End: 2021-05-20 | Stop reason: SDUPTHER

## 2021-03-11 ENCOUNTER — PATIENT OUTREACH (OUTPATIENT)
Dept: FAMILY MEDICINE CLINIC | Facility: CLINIC | Age: 31
End: 2021-03-11

## 2021-03-11 DIAGNOSIS — E11.9 TYPE 2 DIABETES MELLITUS WITHOUT COMPLICATION, WITHOUT LONG-TERM CURRENT USE OF INSULIN (HCC): Primary | ICD-10-CM

## 2021-03-11 NOTE — PROGRESS NOTES
Outpatient Care Management Note:    Care manager called Juani Valdivia  He states he is trying to check his blood sugars more often  This week he checked his sugar twice  Juani Valdivia states he ran out of his metformin last Friday (today is Thursday)  He called [facility] but they did not have the prescription  CM will call [facility] and call him back  CM called [facility]   They have the prescription on hold, because there are 2 different dosing instructions on the script  They need to know if it is 500 mg BID or 2 tablets in am and 1 tablet in pm  CM will forward the message to [employer]  CM called Juani Valdivia back and updated him on above  I instructed him to call me if he has any difficulty getting his metformin  We completed initial assessment  He states he realizes he needs encouragement to improve his diabetes  I encouraged him to set goals he can keep  Juani Valdivia needs to want to manage his disease  I reminded him that he needs to schedule a follow up appt with Erling Moritz to evaluate his blood pressure  During the assessment, Juani Valdivia noted that he weighed 420 lbs  He is now 380 lbs  We reviewed that his weight is definitely affecting his sugars  With the weather improving I strongly encouraged him to get outside, with his kids, and walk  Juani Valdivia did agree to try and check his blood sugars 3 times this week with the long term goal of doing it daily! Juani Valdivia has my contact information and will call with any questions  Voice mail message left of diabetes education attempting to determine if diabetic classes are covered with [employer]  Call received from diabetic education  They do not screen patient's insurance  Juani Valdivia would need to call his insurance himself

## 2021-03-17 ENCOUNTER — PATIENT OUTREACH (OUTPATIENT)
Dept: FAMILY MEDICINE CLINIC | Facility: CLINIC | Age: 31
End: 2021-03-17

## 2021-03-29 ENCOUNTER — PATIENT OUTREACH (OUTPATIENT)
Dept: FAMILY MEDICINE CLINIC | Facility: CLINIC | Age: 31
End: 2021-03-29

## 2021-03-29 NOTE — PROGRESS NOTES
Outpatient Care Management Note:    Voice mail message, left with my contact information, requesting a call back

## 2021-03-31 ENCOUNTER — TELEPHONE (OUTPATIENT)
Dept: FAMILY MEDICINE CLINIC | Facility: CLINIC | Age: 31
End: 2021-03-31

## 2021-03-31 NOTE — TELEPHONE ENCOUNTER
I ordered this in January, the slips are in the computer, you can print them and he can pick them up   I am not responsible for his negligence

## 2021-03-31 NOTE — TELEPHONE ENCOUNTER
Patient called in he said he needs a script for A1C bloodwork  He said he needs to get it done tomorrow or he will lose his  Job         [phone]

## 2021-04-02 LAB
ALBUMIN SERPL-MCNC: 4.4 G/DL (ref 4.1–5.2)
ALBUMIN/CREAT UR: 14 MG/G CREAT (ref 0–29)
ALBUMIN/GLOB SERPL: 1.7 {RATIO} (ref 1.2–2.2)
ALP SERPL-CCNC: 79 IU/L (ref 39–117)
ALT SERPL-CCNC: 41 IU/L (ref 0–44)
AST SERPL-CCNC: 24 IU/L (ref 0–40)
BILIRUB SERPL-MCNC: 0.5 MG/DL (ref 0–1.2)
BUN SERPL-MCNC: 19 MG/DL (ref 6–20)
BUN/CREAT SERPL: 17 (ref 9–20)
CALCIUM SERPL-MCNC: 9.5 MG/DL (ref 8.7–10.2)
CHLORIDE SERPL-SCNC: 101 MMOL/L (ref 96–106)
CO2 SERPL-SCNC: 24 MMOL/L (ref 20–29)
CREAT SERPL-MCNC: 1.09 MG/DL (ref 0.76–1.27)
CREAT UR-MCNC: 169.3 MG/DL
EST. AVERAGE GLUCOSE BLD GHB EST-MCNC: 252 MG/DL
GLOBULIN SER-MCNC: 2.6 G/DL (ref 1.5–4.5)
GLUCOSE SERPL-MCNC: 121 MG/DL (ref 65–99)
HBA1C MFR BLD: 10.4 % (ref 4.8–5.6)
MICROALBUMIN UR-MCNC: 23.9 UG/ML
POTASSIUM SERPL-SCNC: 4.3 MMOL/L (ref 3.5–5.2)
PROT SERPL-MCNC: 7 G/DL (ref 6–8.5)
SL AMB EGFR AFRICAN AMERICAN: 106 ML/MIN/1.73
SL AMB EGFR NON AFRICAN AMERICAN: 92 ML/MIN/1.73
SODIUM SERPL-SCNC: 139 MMOL/L (ref 134–144)

## 2021-04-02 PROCEDURE — 3046F HEMOGLOBIN A1C LEVEL >9.0%: CPT | Performed by: NURSE PRACTITIONER

## 2021-04-02 PROCEDURE — 3061F NEG MICROALBUMINURIA REV: CPT | Performed by: NURSE PRACTITIONER

## 2021-04-05 ENCOUNTER — PATIENT OUTREACH (OUTPATIENT)
Dept: FAMILY MEDICINE CLINIC | Facility: CLINIC | Age: 31
End: 2021-04-05

## 2021-04-05 NOTE — LETTER
Date: 04/06/21    Dear Laura Haile,   My name is Santa Calderón  I am a registered nurse care manager working with [employer]  I have not been able to reach you and would like to set a time that I can talk with you over the phone  My work is to help patients that have complex medical conditions get the care they need  This includes patients who may have been in the hospital or emergency room  Please call me with any questions you may have or if I can be of any further assistance  I look forward to speaking with you    Sincerely,  Santa Calderón  [phone]  Outpatient Care Manager  Copy:  (primary care physician name and address)

## 2021-04-05 NOTE — PROGRESS NOTES
Outpatient Care Management Note:    Voice mail message left for Tiesha Mckeon, with my contact information, requesting a call back  (3rd attempt)    Unable to reach letter mailed to patient

## 2021-04-19 ENCOUNTER — PATIENT OUTREACH (OUTPATIENT)
Dept: FAMILY MEDICINE CLINIC | Facility: CLINIC | Age: 31
End: 2021-04-19

## 2021-04-19 NOTE — PROGRESS NOTES
Outpatient Care Management Note:    Voice mail message received from Moises Navarrete stating that he got my unable to reach letter and was requesting a call back

## 2021-04-20 ENCOUNTER — PATIENT OUTREACH (OUTPATIENT)
Dept: FAMILY MEDICINE CLINIC | Facility: CLINIC | Age: 31
End: 2021-04-20

## 2021-04-20 NOTE — PROGRESS NOTES
Outpatient Care Management Note:    Care manager called Moises Navarrete back  He completed his blood work for his upcoming endocrine appt  His HbA1C=10 4  He was 13 1 in December  He states he is taking his medications as ordered  He is checking blood sugars 4-5 times per week which is an improvement  Last week, many of his sugars were around 130  Moises Navarrete called his insurance and diabetic education is not covered  He notes that his girlfriend has become his diabetic educator  She is monitoring what he eats and constantly encouraging him  CM reinforced that he is making improvements!    He states that he is trying to increase his activity  He is going fishing and kayaking  I encouraged him to try to get some form of exercise at least 3 times per week  Moises Navarrete still has not scheduled with Aquiles Mahmood  I reinforced the importance of following up with his PCP  He stated he keeps meaning to do it but has been busy  He needs to call for a refill of his valsarten  I instructed him to schedule an appt at the same time  Moises Navarrete agreed to [facility] calling back in 2 weeks to track his progress  He has my contact information and will call with any questions

## 2021-04-22 ENCOUNTER — OFFICE VISIT (OUTPATIENT)
Dept: ENDOCRINOLOGY | Facility: HOSPITAL | Age: 31
End: 2021-04-22
Payer: COMMERCIAL

## 2021-04-22 VITALS
DIASTOLIC BLOOD PRESSURE: 70 MMHG | HEART RATE: 100 BPM | WEIGHT: 315 LBS | BODY MASS INDEX: 40.43 KG/M2 | HEIGHT: 74 IN | SYSTOLIC BLOOD PRESSURE: 104 MMHG

## 2021-04-22 DIAGNOSIS — I10 ESSENTIAL HYPERTENSION: ICD-10-CM

## 2021-04-22 DIAGNOSIS — E78.2 MIXED HYPERLIPIDEMIA: ICD-10-CM

## 2021-04-22 DIAGNOSIS — E11.65 TYPE 2 DIABETES MELLITUS WITH HYPERGLYCEMIA, WITHOUT LONG-TERM CURRENT USE OF INSULIN (HCC): Primary | ICD-10-CM

## 2021-04-22 PROCEDURE — 3078F DIAST BP <80 MM HG: CPT | Performed by: NURSE PRACTITIONER

## 2021-04-22 PROCEDURE — 99214 OFFICE O/P EST MOD 30 MIN: CPT | Performed by: NURSE PRACTITIONER

## 2021-04-22 PROCEDURE — 3074F SYST BP LT 130 MM HG: CPT | Performed by: NURSE PRACTITIONER

## 2021-04-22 PROCEDURE — 3008F BODY MASS INDEX DOCD: CPT | Performed by: NURSE PRACTITIONER

## 2021-04-22 PROCEDURE — 4004F PT TOBACCO SCREEN RCVD TLK: CPT | Performed by: NURSE PRACTITIONER

## 2021-04-22 RX ORDER — DULAGLUTIDE 0.75 MG/.5ML
0.75 INJECTION, SOLUTION SUBCUTANEOUS WEEKLY
Qty: 4 PEN | Refills: 4 | Status: SHIPPED | OUTPATIENT
Start: 2021-04-22 | End: 2021-07-27 | Stop reason: ALTCHOICE

## 2021-04-22 RX ORDER — METFORMIN HYDROCHLORIDE 500 MG/1
1000 TABLET, EXTENDED RELEASE ORAL 2 TIMES DAILY WITH MEALS
Qty: 360 TABLET | Refills: 4 | Status: SHIPPED | OUTPATIENT
Start: 2021-04-22 | End: 2021-07-02 | Stop reason: SDUPTHER

## 2021-04-22 NOTE — PROGRESS NOTES
Ирина Burnett 31 y o  male MRN: [id number]    Encounter: [id number]      Assessment/Plan     Assessment: This is a 31y o -year-old male with  Type 2 diabetes with hypertension and hyperlipidemia  Plan:  1  Type 2 diabetes: His hemoglobin A1c has improved to 10 4 but remains above goal   For his gastric discomfort when taking his metformin I have switched him to metformin XR 1000 mg twice daily with breakfast and dinner  Also discussed the benefits, risks and side effects of Trulicity  He has no history of thyroid cancer with him or his family and has no history of pancreatitis  He will start Trulicity 6 14 mg weekly  I have asked him to contact the office with any side effects or concerns  He will check his blood sugars twice daily at alternating times and send a record to the office for review in 2 weeks  I also remind him of the importance of following up with medical nutrition therapy and diabetes education to learn more about his diabetes and diet  Will have him follow up for diabetes eye exam once his blood sugars are better controlled  Reviewed recognition and proper treatment of hypoglycemic episodes  Check hemoglobin A1c prior to next visit  2     Hypertension:  He is normotensive in the office today  Continue Valsartan  Check comprehensive metabolic panel prior to next visit  3    Hyperlipidemia: Continue atorvastatin  Check  Fasting lipid panel prior to next visit  CC:  Type 2 Diabetes follow up    History of Present Illness     HPI:  31y o  year old male with type 2 diabetes for 4 month, hyperlipidemia, hypertension for follow-up  He was diagnosed approximately 4 months ago, he was thirsty and had polyuria  He was started on metformin  He is on oral agents at home and takes metformin 1000 mg twice daily  His most recent hemoglobin A1c from April 1, 2021 is 10 4  He complains of some  mild to moderate GI distress at times with metformin   He denies any polyphagia, and blurry vision  He has significant polyuria and nocturia every hour and polydipsia which are improving with the use of metformin  He now has only occasional nocturia  He denies numbness or tingling of the feet  He denies chest pain or shortness of breath  He denies neuropathy, nephropathy, retinopathy, heart attack, stroke and claudication but does admit to none  He did lose 40 lb prior to the diagnosis of diabetes      Hypoglycemic episodes: No never  H/o of hypoglycemia causing hospitalization or intervention such as glucagon injection  or ambulance call  No   Hypoglycemia symptoms: never had one  Treatment of hypoglycemia: would eat food  Glucagon:No   Medic alert tag: recommended,Yes       The patient's last eye exam was not yet  The patient's last foot exam was performed at last office visit on January 11, 2021      Blood Sugar/Glucometer/Pump/CGM review: Presents with no blood sugar records or meter for download in the office today      He has hypertension and takes valsartan 160 mg daily  He denies headache or stroke-like symptoms  He has hyperlipidemia and takes atorvastatin 20 mg daily  He denies chest pain or shortness of breath  Review of Systems   Constitutional: Negative  Negative for chills, fatigue and fever  HENT: Negative  Negative for trouble swallowing and voice change  Eyes: Negative for photophobia, pain, discharge, redness, itching and visual disturbance  Respiratory: Negative for cough and shortness of breath  Cardiovascular: Negative for chest pain and palpitations  Gastrointestinal: Negative for abdominal pain, constipation, diarrhea, nausea and vomiting  Endocrine: Positive for polyuria ( once per night nocturia)  Negative for cold intolerance, heat intolerance, polydipsia and polyphagia  Genitourinary: Negative  Musculoskeletal: Negative  Skin: Negative  Allergic/Immunologic: Negative      Neurological: Negative for syncope, light-headedness and headaches  Hematological: Negative  Psychiatric/Behavioral: Negative  All other systems reviewed and are negative  Historical Information   Past Medical History:   Diagnosis Date    GERD (gastroesophageal reflux disease)     Herniated lumbar intervertebral disc      Past Surgical History:   Procedure Laterality Date    ESOPHAGUS SURGERY      EGD with blood vessel clipping     Social History   Social History     Substance and Sexual Activity   Alcohol Use Yes    Frequency: Monthly or less    Drinks per session: 7 to 9    Binge frequency: Monthly     Social History     Substance and Sexual Activity   Drug Use Not Currently     Social History     Tobacco Use   Smoking Status Current Every Day Smoker    Packs/day: 1 50    Types: Cigarettes   Smokeless Tobacco Former User    Types: Chew     Family History:   Family History   Problem Relation Age of Onset    Alcohol abuse Father     Cirrhosis Father     Hypertension Sister     No Known Problems Son     No Known Problems Son        Meds/Allergies   Current Outpatient Medications   Medication Sig Dispense Refill    Blood Glucose Monitoring Suppl (ONE TOUCH ULTRA 2) w/Device KIT Use to test blood sugars twice a day 1 each 0    glucose blood (OneTouch Ultra) test strip Use 1 each daily Use as instructed 100 each 3    OneTouch Delica Lancets 49B MISC Use daily 100 each 3    atorvastatin (LIPITOR) 20 mg tablet Take 1 tablet (20 mg total) by mouth daily 30 tablet 2    metFORMIN (GLUCOPHAGE) 500 mg tablet Take 2 tablets (1,000 mg total) by mouth 2 (two) times a day with meals 120 tablet 2    valsartan (DIOVAN) 160 mg tablet Take 1 tablet (160 mg total) by mouth daily 30 tablet 2     No current facility-administered medications for this visit  No Known Allergies    Objective   Vitals: Height 6' 2" (1 88 m)  Physical Exam  Vitals signs reviewed  Constitutional:       Appearance: He is well-developed  He is obese     HENT:      Head: Normocephalic and atraumatic  Nose: Nose normal    Eyes:      Conjunctiva/sclera: Conjunctivae normal       Pupils: Pupils are equal, round, and reactive to light  Neck:      Musculoskeletal: Normal range of motion and neck supple  Cardiovascular:      Rate and Rhythm: Normal rate and regular rhythm  Heart sounds: Normal heart sounds  Pulmonary:      Effort: Pulmonary effort is normal       Breath sounds: Normal breath sounds  Abdominal:      General: Bowel sounds are normal       Palpations: Abdomen is soft  Musculoskeletal: Normal range of motion  Skin:     General: Skin is warm and dry  Neurological:      Mental Status: He is alert and oriented to person, place, and time  Psychiatric:         Behavior: Behavior normal          Thought Content:  Thought content normal          Judgment: Judgment normal        Lab Results:   Lab Results   Component Value Date/Time    Hemoglobin A1C 10 4 (H) 04/01/2021 12:30 PM    Hemoglobin A1C 13 1 (H) 12/10/2020 01:45 PM    White Blood Cell Count 6 4 12/10/2020 01:45 PM    Hemoglobin 16 6 12/10/2020 01:45 PM    HCT 49 4 12/10/2020 01:45 PM    MCV 83 12/10/2020 01:45 PM    Platelet Count 785 14/78/9251 01:45 PM    BUN 19 04/01/2021 12:30 PM    BUN 17 12/10/2020 01:45 PM    Potassium 4 3 04/01/2021 12:30 PM    Potassium 4 1 12/10/2020 01:45 PM    Chloride 101 04/01/2021 12:30 PM    Chloride 97 12/10/2020 01:45 PM    CO2 24 04/01/2021 12:30 PM    CO2 24 12/10/2020 01:45 PM    Creatinine 1 09 04/01/2021 12:30 PM    Creatinine 0 98 12/10/2020 01:45 PM    AST 24 04/01/2021 12:30 PM    AST 28 12/10/2020 01:45 PM    ALT 41 04/01/2021 12:30 PM    ALT 55 (H) 12/10/2020 01:45 PM    Albumin 4 4 04/01/2021 12:30 PM    Albumin 4 5 12/10/2020 01:45 PM    Globulin, Total 2 6 04/01/2021 12:30 PM    Globulin, Total 2 6 12/10/2020 01:45 PM    HDL 23 (L) 12/10/2020 01:45 PM    Triglycerides 658 (HH) 12/10/2020 01:45 PM       Portions of the record may have been created with voice recognition software  Occasional wrong word or "sound a like" substitutions may have occurred due to the inherent limitations of voice recognition software  Read the chart carefully and recognize, using context, where substitutions have occurred

## 2021-04-22 NOTE — PATIENT INSTRUCTIONS
Your hemoglobin A1c has improved to 10 4 but remains elevated  Stop Juice and Soda  Goal for your hemoglobin A1c is less than 7  Switch to Metformin XR 1000 mg twice daily  As discussed, start Trulicity 5 65 mg weekly  Contact the office with any side effects or concerns  Check your blood sugars twice daily at alternating times and send a record to the office in 2 weeks after starting Trulicity for review  Continue valsartan and atorvastatin  Obtain lab work as prescribed  Follow up with diabetes education and medical nutrition therapy

## 2021-05-06 ENCOUNTER — PATIENT OUTREACH (OUTPATIENT)
Dept: FAMILY MEDICINE CLINIC | Facility: CLINIC | Age: 31
End: 2021-05-06

## 2021-05-06 NOTE — PROGRESS NOTES
Outpatient Care Management Note:    Care manager attempted to call Tiesha Mckeon  He did not answer and his voice mail would not accept my message  CM will call back

## 2021-05-13 ENCOUNTER — PATIENT OUTREACH (OUTPATIENT)
Dept: FAMILY MEDICINE CLINIC | Facility: CLINIC | Age: 31
End: 2021-05-13

## 2021-05-13 NOTE — PROGRESS NOTES
Outpatient Care Management Note:    Voice mail message left for Gilson Gupta, with my contact information, requesting a call back

## 2021-05-20 ENCOUNTER — PATIENT OUTREACH (OUTPATIENT)
Dept: FAMILY MEDICINE CLINIC | Facility: CLINIC | Age: 31
End: 2021-05-20

## 2021-05-20 DIAGNOSIS — E78.2 MIXED HYPERLIPIDEMIA: ICD-10-CM

## 2021-05-20 DIAGNOSIS — I10 ESSENTIAL HYPERTENSION: ICD-10-CM

## 2021-05-20 PROCEDURE — 4010F ACE/ARB THERAPY RXD/TAKEN: CPT | Performed by: NURSE PRACTITIONER

## 2021-05-20 RX ORDER — ATORVASTATIN CALCIUM 20 MG/1
20 TABLET, FILM COATED ORAL DAILY
Qty: 30 TABLET | Refills: 2 | Status: SHIPPED | OUTPATIENT
Start: 2021-05-20 | End: 2021-07-27 | Stop reason: SDUPTHER

## 2021-05-20 RX ORDER — VALSARTAN 160 MG/1
160 TABLET ORAL DAILY
Qty: 30 TABLET | Refills: 2 | Status: SHIPPED | OUTPATIENT
Start: 2021-05-20 | End: 2022-01-21 | Stop reason: ALTCHOICE

## 2021-05-20 NOTE — PROGRESS NOTES
Outpatient Care Management Note:    Care manager called Graham Vaughn  He states that he did not start his trulicity as ordered by endocrine, because he was worried about side effects  I reviewed that he is scheduled with diabetic education today at 3:45 pm   It is a perfect opportunity to review how to use trulicity and review his concerns  Graham Vaughn thought the appt was tomorrow  I encouraged him to call if he does not plan to attend  CM did strongly encourage him to attend  Graham Vaughn states that his metformin was changed to extended release  Since he made the change his blood sugars have been greatly improved  He states that he is averaging between   I instructed him to call endocrine with his blood sugars and discuss if he should start trulicity if he does not attend diabetic education appt today  Teresajoycelyn Johana is aware that he needs to schedule a PCP and eye appt  He noted that he ran out of his vlasarten and atorvastatin about 2 weeks ago  CM reviewed that Teresajoycelyn Vaughn needs to start managing his health care needs  He is a young man and will end up with severe health issues-heart attack, stroke, amputation etc if he does not start caring for his medical needs  Melissa Schaefer is off today  CM will forward my note to Dr Chanelle Vaughn has my contact information and will call with any questions

## 2021-06-03 ENCOUNTER — PATIENT OUTREACH (OUTPATIENT)
Dept: FAMILY MEDICINE CLINIC | Facility: CLINIC | Age: 31
End: 2021-06-03

## 2021-06-03 NOTE — PROGRESS NOTES
Outpatient Care Management Note:    Voice mail message left for Susana Joy, with my contact information, requesting a call back

## 2021-06-17 ENCOUNTER — PATIENT OUTREACH (OUTPATIENT)
Dept: FAMILY MEDICINE CLINIC | Facility: CLINIC | Age: 31
End: 2021-06-17

## 2021-06-17 NOTE — PROGRESS NOTES
Outpatient Care Management Note:    Voice mail message left for Severa Muff, with my contact information, requesting a call back

## 2021-06-24 ENCOUNTER — PATIENT OUTREACH (OUTPATIENT)
Dept: FAMILY MEDICINE CLINIC | Facility: CLINIC | Age: 31
End: 2021-06-24

## 2021-06-24 NOTE — PROGRESS NOTES
Outpatient Care Management Note:    Voice mail message left for Dick Damon, with my contact information, requesting a call back  (3rd attempt)    Unable to reach letter mailed to patient

## 2021-06-25 LAB
ALBUMIN SERPL-MCNC: 4.1 G/DL (ref 4.1–5.2)
ALBUMIN/GLOB SERPL: 1.6 {RATIO} (ref 1.2–2.2)
ALP SERPL-CCNC: 62 IU/L (ref 48–121)
ALT SERPL-CCNC: 35 IU/L (ref 0–44)
AST SERPL-CCNC: 22 IU/L (ref 0–40)
BILIRUB SERPL-MCNC: 0.2 MG/DL (ref 0–1.2)
BUN SERPL-MCNC: 19 MG/DL (ref 6–20)
BUN/CREAT SERPL: 18 (ref 9–20)
C PEPTIDE SERPL-MCNC: 5.2 NG/ML (ref 1.1–4.4)
CALCIUM SERPL-MCNC: 9.1 MG/DL (ref 8.7–10.2)
CHLORIDE SERPL-SCNC: 104 MMOL/L (ref 96–106)
CHOLEST SERPL-MCNC: 213 MG/DL (ref 100–199)
CO2 SERPL-SCNC: 23 MMOL/L (ref 20–29)
CREAT SERPL-MCNC: 1.04 MG/DL (ref 0.76–1.27)
EST. AVERAGE GLUCOSE BLD GHB EST-MCNC: 154 MG/DL
GAD65 AB SER-ACNC: <5 U/ML (ref 0–5)
GLOBULIN SER-MCNC: 2.5 G/DL (ref 1.5–4.5)
GLUCOSE SERPL-MCNC: 98 MG/DL (ref 65–99)
HBA1C MFR BLD: 7 % (ref 4.8–5.6)
HDLC SERPL-MCNC: 27 MG/DL
INSULIN AB SER-ACNC: <5 UU/ML
ISLET CELL512 AB SER-ACNC: <7.5 U/ML
LDLC SERPL CALC-MCNC: 160 MG/DL (ref 0–99)
POTASSIUM SERPL-SCNC: 4.4 MMOL/L (ref 3.5–5.2)
PROT SERPL-MCNC: 6.6 G/DL (ref 6–8.5)
SL AMB EGFR AFRICAN AMERICAN: 112 ML/MIN/1.73
SL AMB EGFR NON AFRICAN AMERICAN: 97 ML/MIN/1.73
SL AMB VLDL CHOLESTEROL CALC: 26 MG/DL (ref 5–40)
SODIUM SERPL-SCNC: 140 MMOL/L (ref 134–144)
TRIGL SERPL-MCNC: 140 MG/DL (ref 0–149)
TSH SERPL DL<=0.005 MIU/L-ACNC: 0.69 UIU/ML (ref 0.45–4.5)

## 2021-07-02 DIAGNOSIS — E11.9 TYPE 2 DIABETES MELLITUS WITHOUT COMPLICATION, WITHOUT LONG-TERM CURRENT USE OF INSULIN (HCC): ICD-10-CM

## 2021-07-02 DIAGNOSIS — E11.65 TYPE 2 DIABETES MELLITUS WITH HYPERGLYCEMIA, WITHOUT LONG-TERM CURRENT USE OF INSULIN (HCC): ICD-10-CM

## 2021-07-02 RX ORDER — METFORMIN HYDROCHLORIDE 500 MG/1
1000 TABLET, EXTENDED RELEASE ORAL 2 TIMES DAILY WITH MEALS
Qty: 360 TABLET | Refills: 1 | Status: SHIPPED | OUTPATIENT
Start: 2021-07-02 | End: 2022-01-21 | Stop reason: ALTCHOICE

## 2021-07-02 RX ORDER — BLOOD SUGAR DIAGNOSTIC
1 STRIP MISCELLANEOUS DAILY
Qty: 100 EACH | Refills: 3 | Status: SHIPPED | OUTPATIENT
Start: 2021-07-02 | End: 2022-07-05

## 2021-07-22 ENCOUNTER — PATIENT OUTREACH (OUTPATIENT)
Dept: FAMILY MEDICINE CLINIC | Facility: CLINIC | Age: 31
End: 2021-07-22

## 2021-07-22 NOTE — PROGRESS NOTES
Outpatient Care Management Note:    No response to telephone calls or unable to reach letter mailed to patient  Patient closed to care management services

## 2021-07-27 ENCOUNTER — OFFICE VISIT (OUTPATIENT)
Dept: ENDOCRINOLOGY | Facility: HOSPITAL | Age: 31
End: 2021-07-27
Payer: COMMERCIAL

## 2021-07-27 VITALS
HEART RATE: 94 BPM | BODY MASS INDEX: 40.43 KG/M2 | HEIGHT: 74 IN | SYSTOLIC BLOOD PRESSURE: 110 MMHG | DIASTOLIC BLOOD PRESSURE: 78 MMHG | WEIGHT: 315 LBS

## 2021-07-27 DIAGNOSIS — E78.2 MIXED HYPERLIPIDEMIA: ICD-10-CM

## 2021-07-27 DIAGNOSIS — E11.65 TYPE 2 DIABETES MELLITUS WITH HYPERGLYCEMIA, WITHOUT LONG-TERM CURRENT USE OF INSULIN (HCC): Primary | ICD-10-CM

## 2021-07-27 PROCEDURE — 3078F DIAST BP <80 MM HG: CPT | Performed by: PHYSICIAN ASSISTANT

## 2021-07-27 PROCEDURE — 3008F BODY MASS INDEX DOCD: CPT | Performed by: PHYSICIAN ASSISTANT

## 2021-07-27 PROCEDURE — 3074F SYST BP LT 130 MM HG: CPT | Performed by: PHYSICIAN ASSISTANT

## 2021-07-27 PROCEDURE — 99214 OFFICE O/P EST MOD 30 MIN: CPT | Performed by: PHYSICIAN ASSISTANT

## 2021-07-27 PROCEDURE — 4004F PT TOBACCO SCREEN RCVD TLK: CPT | Performed by: PHYSICIAN ASSISTANT

## 2021-07-27 RX ORDER — ATORVASTATIN CALCIUM 20 MG/1
20 TABLET, FILM COATED ORAL DAILY
Qty: 90 TABLET | Refills: 1 | Status: SHIPPED | OUTPATIENT
Start: 2021-07-27 | End: 2022-01-21 | Stop reason: SDUPTHER

## 2021-07-27 NOTE — PATIENT INSTRUCTIONS
Continue monitor diet, maintain physical activity  Make sure to drink plenty of water throughout the day  Continue with metformin 1000 mg twice a day  Recommend restarting atorvastatin  Follow up in 3 months with lab work prior to visit

## 2021-07-27 NOTE — PROGRESS NOTES
Luna Dunne 36 y o  male MRN: [id number]    Encounter: [id number]      Assessment/Plan     Assessment: This is a 36y o -year-old male with type 2 diabetes with hypertension and hyperlipidemia  Plan:  1  Type 2 diabetes:  Most recent hemoglobin A1c has improved to 7 0  This is excellent  Informed him that we are at the bare minimum of goal for his A1c  At this time did discuss medication management for his diabetes  Will continue with metformin 1000 mg twice a day  He did not take the Trulicity after last visit, due to some slight fear of needles  If a 2nd medication is needed may consider Jardiance  Discussed the importance of checking blood sugar daily  Would recommend sending blood sugar logs in 2 weeks  Follow-up in 3 months with lab work completed prior to visit  Continue with lifestyle modifications      2  Hypertension:  He is normotensive in the office today  Has not been taking his blood pressure medication recently  Will continue to monitor off  Medication      3  Hyperlipidemia:  Recent lab work show significantly elevated LDL cholesterol and total cholesterol  At this time recommend restarting atorvastatin  Will continue to monitor in future  CC:  Type 2 diabetes follow-up    History of Present Illness     HPI:  31 y  o  year old male with type 2 diabetes for 4 month, hyperlipidemia, hypertension for follow-up  He was diagnosed December 2020, he was thirsty and had polyuria   He was started on metformin   He is on oral agents at home and takes metformin 1000 mg twice daily  He was prescribed Trulicity at his last office visit  However, after picking up the prescription he was concerned about taking medication with needle and did not start the Trulicity  His most recent hemoglobin A1c from June 18, 2021 is 7 0   He complains of some mild to moderate GI distress at times with metformin  He denies any polyphagia, and blurry vision   He has significant polyuria and nocturia every hour and polydipsia which are improving with the use of metformin  Jennyfer Portillo now has only occasional nocturia   He denies numbness or tingling of the feet   He denies chest pain or shortness of breath   He denies neuropathy, nephropathy, retinopathy, heart attack, stroke and claudication but does admit to none   He did lose 40 lb prior to the diagnosis of diabetes      Hypoglycemic episodes: No never  H/o of hypoglycemia causing hospitalization or intervention such as glucagon injection  or ambulance call  No   Hypoglycemia symptoms: never had one  Treatment of hypoglycemia: would eat food  Glucagon:No   Medic alert tag: recommended,Yes       The patient's last eye exam was not yet   The patient's last foot exam was performed at last office visit on January 11, 2021      Blood Sugar/Glucometer/Pump/CGM review:   Fasting blood sugar is typically below 100  Prior to meals it is typically below 130  If he has not been pain attention to his diet, can be closer to 190      He has hypertension and takes valsartan 160 mg daily   He denies headache or stroke-like symptoms      He has hyperlipidemia and takes atorvastatin 20 mg daily   He denies chest pain or shortness of breath  Review of Systems   Constitutional: Negative for activity change, appetite change, fatigue and unexpected weight change  HENT: Negative for trouble swallowing  Eyes: Negative for visual disturbance  Respiratory: Negative for chest tightness and shortness of breath  Cardiovascular: Negative for chest pain, palpitations and leg swelling  Gastrointestinal: Negative for abdominal pain, diarrhea, nausea and vomiting  Endocrine: Negative for cold intolerance, heat intolerance, polydipsia, polyphagia and polyuria  Genitourinary: Negative for frequency  Skin: Negative for rash and wound  Neurological: Negative for dizziness, weakness, light-headedness, numbness and headaches     Psychiatric/Behavioral: Negative for dysphoric mood and sleep disturbance  The patient is not nervous/anxious  Historical Information   Past Medical History:   Diagnosis Date    GERD (gastroesophageal reflux disease)     Herniated lumbar intervertebral disc      Past Surgical History:   Procedure Laterality Date    ESOPHAGUS SURGERY      EGD with blood vessel clipping     Social History   Social History     Substance and Sexual Activity   Alcohol Use Yes     Social History     Substance and Sexual Activity   Drug Use Not Currently     Social History     Tobacco Use   Smoking Status Current Every Day Smoker    Packs/day: 1 50    Types: Cigarettes   Smokeless Tobacco Former User    Types: Chew     Family History:   Family History   Problem Relation Age of Onset    Alcohol abuse Father     Cirrhosis Father     Hypertension Sister     No Known Problems Son     No Known Problems Son        Meds/Allergies   Current Outpatient Medications   Medication Sig Dispense Refill    Blood Glucose Monitoring Suppl (ONE TOUCH ULTRA 2) w/Device KIT Use to test blood sugars twice a day 1 each 0    glucose blood (OneTouch Ultra) test strip Use 1 each daily Use as instructed 100 each 3    metFORMIN (GLUCOPHAGE-XR) 500 mg 24 hr tablet Take 2 tablets (1,000 mg total) by mouth 2 (two) times a day with meals 360 tablet 1    OneTouch Delica Lancets 44V MISC Use daily 100 each 3    atorvastatin (LIPITOR) 20 mg tablet Take 1 tablet (20 mg total) by mouth daily 90 tablet 1    valsartan (DIOVAN) 160 mg tablet Take 1 tablet (160 mg total) by mouth daily (Patient not taking: Reported on 7/27/2021) 30 tablet 2     No current facility-administered medications for this visit  No Known Allergies    Objective   Vitals: Blood pressure 110/78, pulse 94, height 6' 2" (1 88 m), weight (!) 177 kg (390 lb 9 6 oz)  Physical Exam  Vitals and nursing note reviewed  Constitutional:       General: He is not in acute distress  Appearance: Normal appearance  He is not diaphoretic  HENT:      Head: Normocephalic and atraumatic  Eyes:      General: No scleral icterus  Extraocular Movements: Extraocular movements intact  Conjunctiva/sclera: Conjunctivae normal       Pupils: Pupils are equal, round, and reactive to light  Cardiovascular:      Rate and Rhythm: Normal rate and regular rhythm  Heart sounds: No murmur heard  Pulmonary:      Effort: Pulmonary effort is normal  No respiratory distress  Breath sounds: Normal breath sounds  No wheezing  Musculoskeletal:      Cervical back: Normal range of motion  Right lower leg: No edema  Left lower leg: No edema  Lymphadenopathy:      Cervical: No cervical adenopathy  Skin:     General: Skin is warm and dry  Neurological:      Mental Status: He is alert and oriented to person, place, and time  Mental status is at baseline  Sensory: No sensory deficit  Gait: Gait normal    Psychiatric:         Mood and Affect: Mood normal          Behavior: Behavior normal          Thought Content: Thought content normal          The history was obtained from the review of the chart, patient      Lab Results:   Lab Results   Component Value Date/Time    Hemoglobin A1C 7 0 (H) 06/18/2021 01:42 PM    Hemoglobin A1C 7 0 06/18/2021 12:00 AM    Hemoglobin A1C 10 4 (H) 04/01/2021 12:30 PM    Hemoglobin A1C 13 1 (H) 12/10/2020 01:45 PM    White Blood Cell Count 6 4 12/10/2020 01:45 PM    Hemoglobin 16 6 12/10/2020 01:45 PM    HCT 49 4 12/10/2020 01:45 PM    MCV 83 12/10/2020 01:45 PM    Platelet Count 433 23/48/5469 01:45 PM    BUN 19 06/18/2021 01:42 PM    BUN 19 04/01/2021 12:30 PM    BUN 17 12/10/2020 01:45 PM    Potassium 4 4 06/18/2021 01:42 PM    Potassium 4 3 04/01/2021 12:30 PM    Potassium 4 1 12/10/2020 01:45 PM    Chloride 104 06/18/2021 01:42 PM    Chloride 101 04/01/2021 12:30 PM    Chloride 97 12/10/2020 01:45 PM    CO2 23 06/18/2021 01:42 PM    CO2 24 04/01/2021 12:30 PM    CO2 24 12/10/2020 01:45 PM Creatinine 1 04 06/18/2021 01:42 PM    Creatinine 1 09 04/01/2021 12:30 PM    Creatinine 0 98 12/10/2020 01:45 PM    AST 22 06/18/2021 01:42 PM    AST 24 04/01/2021 12:30 PM    AST 28 12/10/2020 01:45 PM    ALT 35 06/18/2021 01:42 PM    ALT 41 04/01/2021 12:30 PM    ALT 55 (H) 12/10/2020 01:45 PM    Albumin 4 1 06/18/2021 01:42 PM    Albumin 4 4 04/01/2021 12:30 PM    Albumin 4 5 12/10/2020 01:45 PM    Globulin, Total 2 5 06/18/2021 01:42 PM    Globulin, Total 2 6 04/01/2021 12:30 PM    Globulin, Total 2 6 12/10/2020 01:45 PM    HDL 27 (L) 06/18/2021 01:42 PM    HDL 23 (L) 12/10/2020 01:45 PM    Triglycerides 140 06/18/2021 01:42 PM    Triglycerides 658 (HH) 12/10/2020 01:45 PM         Portions of the record may have been created with voice recognition software  Occasional wrong word or "sound a like" substitutions may have occurred due to the inherent limitations of voice recognition software  Read the chart carefully and recognize, using context, where substitutions have occurred

## 2021-07-28 ENCOUNTER — TELEPHONE (OUTPATIENT)
Dept: ADMINISTRATIVE | Facility: OTHER | Age: 31
End: 2021-07-28

## 2021-07-28 NOTE — TELEPHONE ENCOUNTER
Upon review of the In Basket request and the patient's chart, initial outreach has been made via fax, please see Contacts section for details       Thank you  Karen Conklin MA

## 2021-07-28 NOTE — TELEPHONE ENCOUNTER
Upon review of the In Basket request we have found as a result of outreach that patient did not have the requested item(s) completed  Not a current patient  Any additional questions or concerns should be emailed to the Practice Liaisons via [email or web address] email, please do not reply via In Basket      Thank you  Cecy Cook MA

## 2021-07-28 NOTE — TELEPHONE ENCOUNTER
----- Message from Mariaelena Mackenzie sent at 7/27/2021  2:53 PM EDT -----  Regarding: diabetic eye exam  07/27/21 2:53 PM    Hello, our patient Erika Fonseca has had a diabetic eye exam completed/performed  Please assist [facility]  The date of service was June      Thank you,  Rubin Peterson MA  [facility] Arlen Lyons

## 2021-07-28 NOTE — LETTER
Diabetic Eye Exam Form    Date Requested: 21  Patient: Glendy De Souza  Patient : 1990   Referring Provider: LEEANN Juarez    Dilated Retinal Exam, Optomap-Iris Exam, or Fundus Photography Done         Yes ([place] one above)         No     Date of Diabetic Eye Exam ______________________________  Left Eye      Exam did show retinopathy    Exam did not show retinopathy         Mild       Moderate       None       Proliferative       Severe     Right Eye     Exam did show retinopathy    Exam did not show retinopathy         Mild       Moderate       None       Proliferative       Severe     Comments __________________________________________________________    Practice Providing Exam ______________________________________________    Exam Performed By (print name) _______________________________________      Provider Signature ___________________________________________________      These reports are needed for  compliance    Please fax this completed form and a copy of the Diabetic Eye Exam report to our office located at [place] [address] as soon as possible to [phone] donnell Peterson: Phone [phone]    We thank you for your assistance in treating our mutual patient

## 2021-09-10 NOTE — LETTER
Date: 06/24/21    Dear Laura Haile,   My name is Santa Martinmelanie  I am a registered nurse care manager working with [employer]  I have not been able to reach you and would like to set a time that I can talk with you over the phone  My work is to help patients that have complex medical conditions get the care they need  This includes patients who may have been in the hospital or emergency room  Please call me with any questions you may have, or if I can be of any further assistance  I look forward to speaking with you    Sincerely,  Santa Calderón  [phone]  Outpatient Care Manager  Copy:  (primary care physician name and address) Detail Level: Detailed Depth Of Biopsy: dermis Was A Bandage Applied: Yes Size Of Lesion In Cm: 0.7 Biopsy Type: H and E Biopsy Method: Dermablade Anesthesia Type: 2% lidocaine with epinephrine Anesthesia Volume In Cc (Will Not Render If 0): 0.5 Additional Anesthesia Volume In Cc (Will Not Render If 0): 0 Hemostasis: Drysol and Portable Heat Cautery Wound Care: Petrolatum Dressing: bandage Destruction After The Procedure: No Type Of Destruction Used: Curettage Curettage Text: The wound bed was treated with curettage after the biopsy was performed. Cryotherapy Text: The wound bed was treated with cryotherapy after the biopsy was performed. Electrodesiccation Text: The wound bed was treated with electrodesiccation after the biopsy was performed. Electrodesiccation And Curettage Text: The wound bed was treated with electrodesiccation and curettage after the biopsy was performed. Silver Nitrate Text: The wound bed was treated with silver nitrate after the biopsy was performed. Lab: 6 Lab Facility: 3 Consent: Written consent was obtained and risks were reviewed including but not limited to scarring, infection, bleeding, scabbing, incomplete removal, nerve damage and allergy to anesthesia. Post-Care Instructions: I reviewed with the patient in detail post-care instructions. Patient is to keep the biopsy site dry overnight, and then apply vaseline twice daily until healed. Patient may apply hydrogen peroxide soaks to remove any crusting. Notification Instructions: Patient will be notified of biopsy results. However, patient instructed to call the office if not contacted within 2 weeks. Billing Type: Third-Party Bill Information: Selecting Yes will display possible errors in your note based on the variables you have selected. This validation is only offered as a suggestion for you. PLEASE NOTE THAT THE VALIDATION TEXT WILL BE REMOVED WHEN YOU FINALIZE YOUR NOTE. IF YOU WANT TO FAX A PRELIMINARY NOTE YOU WILL NEED TO TOGGLE THIS TO 'NO' IF YOU DO NOT WANT IT IN YOUR FAXED NOTE. Size Of Lesion In Cm: 0.4

## 2021-12-28 ENCOUNTER — TELEPHONE (OUTPATIENT)
Dept: ENDOCRINOLOGY | Facility: HOSPITAL | Age: 31
End: 2021-12-28

## 2022-01-01 NOTE — PROGRESS NOTES
Outpatient Care Management Note:    Voice mail message left for Alejandra Sandra, with my contact information, requesting a call back to follow up and be sure he got his medications  Screen#: [id number]  Screen Date: 2022  Screen Comment: N/A

## 2022-01-18 LAB
ALBUMIN SERPL-MCNC: 4.2 G/DL (ref 4.1–5.2)
ALBUMIN/CREAT UR: 188 MG/G CREAT (ref 0–29)
ALBUMIN/GLOB SERPL: 1.4 {RATIO} (ref 1.2–2.2)
ALP SERPL-CCNC: 85 IU/L (ref 44–121)
ALT SERPL-CCNC: 42 IU/L (ref 0–44)
AST SERPL-CCNC: 25 IU/L (ref 0–40)
BILIRUB SERPL-MCNC: 0.3 MG/DL (ref 0–1.2)
BUN SERPL-MCNC: 17 MG/DL (ref 6–20)
BUN/CREAT SERPL: 17 (ref 9–20)
CALCIUM SERPL-MCNC: 9.9 MG/DL (ref 8.7–10.2)
CHLORIDE SERPL-SCNC: 97 MMOL/L (ref 96–106)
CHOLEST SERPL-MCNC: 218 MG/DL (ref 100–199)
CO2 SERPL-SCNC: 26 MMOL/L (ref 20–29)
CREAT SERPL-MCNC: 1.03 MG/DL (ref 0.76–1.27)
CREAT UR-MCNC: 163.6 MG/DL
GLOBULIN SER-MCNC: 3.1 G/DL (ref 1.5–4.5)
GLUCOSE SERPL-MCNC: 334 MG/DL (ref 65–99)
HBA1C MFR BLD: 11.6 % (ref 4.8–5.6)
HDLC SERPL-MCNC: 24 MG/DL
LDLC SERPL CALC-MCNC: 125 MG/DL (ref 0–99)
MICROALBUMIN UR-MCNC: 307.1 UG/ML
POTASSIUM SERPL-SCNC: 4.4 MMOL/L (ref 3.5–5.2)
PROT SERPL-MCNC: 7.3 G/DL (ref 6–8.5)
SL AMB EGFR AFRICAN AMERICAN: 113 ML/MIN/1.73
SL AMB EGFR NON AFRICAN AMERICAN: 98 ML/MIN/1.73
SL AMB VLDL CHOLESTEROL CALC: 69 MG/DL (ref 5–40)
SODIUM SERPL-SCNC: 137 MMOL/L (ref 134–144)
TRIGL SERPL-MCNC: 386 MG/DL (ref 0–149)

## 2022-01-18 PROCEDURE — 3046F HEMOGLOBIN A1C LEVEL >9.0%: CPT | Performed by: NURSE PRACTITIONER

## 2022-01-18 PROCEDURE — 3062F POS MACROALBUMINURIA REV: CPT | Performed by: NURSE PRACTITIONER

## 2022-01-21 ENCOUNTER — OFFICE VISIT (OUTPATIENT)
Dept: ENDOCRINOLOGY | Facility: HOSPITAL | Age: 32
End: 2022-01-21
Payer: COMMERCIAL

## 2022-01-21 VITALS
HEIGHT: 74 IN | BODY MASS INDEX: 40.43 KG/M2 | SYSTOLIC BLOOD PRESSURE: 122 MMHG | DIASTOLIC BLOOD PRESSURE: 80 MMHG | HEART RATE: 108 BPM | WEIGHT: 315 LBS

## 2022-01-21 DIAGNOSIS — I10 ESSENTIAL HYPERTENSION: ICD-10-CM

## 2022-01-21 DIAGNOSIS — E11.65 TYPE 2 DIABETES MELLITUS WITH HYPERGLYCEMIA, WITHOUT LONG-TERM CURRENT USE OF INSULIN (HCC): Primary | ICD-10-CM

## 2022-01-21 DIAGNOSIS — E78.2 MIXED HYPERLIPIDEMIA: ICD-10-CM

## 2022-01-21 PROCEDURE — 99214 OFFICE O/P EST MOD 30 MIN: CPT | Performed by: PHYSICIAN ASSISTANT

## 2022-01-21 RX ORDER — ATORVASTATIN CALCIUM 20 MG/1
20 TABLET, FILM COATED ORAL DAILY
Qty: 90 TABLET | Refills: 1 | Status: SHIPPED | OUTPATIENT
Start: 2022-01-21 | End: 2022-08-05 | Stop reason: SDUPTHER

## 2022-01-21 RX ORDER — DULAGLUTIDE 1.5 MG/.5ML
1.5 INJECTION, SOLUTION SUBCUTANEOUS WEEKLY
Qty: 2 ML | Refills: 5 | Status: SHIPPED | OUTPATIENT
Start: 2022-01-21 | End: 2022-02-25

## 2022-01-21 NOTE — PATIENT INSTRUCTIONS
Continue monitor diet, maintain physical activity  Make sure to drink plenty of water throughout the day        Stop metformin  Continue with Trulicity, but increase to 1 5 mg weekly      Recommend restarting atorvastatin  Call with any concerns with blood sugars      Follow up in 3 months with lab work prior to visit

## 2022-01-21 NOTE — PROGRESS NOTES
Maurice Murphy 36 y o  male MRN: [id number]    Encounter: [id number]      Assessment/Plan     Assessment: This is a 36y o -year-old male with type 2 diabetes with hypertension and hyperlipidemia  Plan:  1  Type 2 diabetes:  Most recent hemoglobin A1c has increased significantly to 11 6  This is likely due to being without medication for several months, and only recently being started on Trulicity  At this time since he is not having side effects with the Trulicity will increase to 1 5 mg weekly  Continue checking blood sugar 2 or more times a day  Asked him to send in blood sugar logs in about 2-4 weeks and see if we need to increase his Trulicity  Did advise him that if we are unable to decrease Q6U with Trulicity alone, may have to add an additional medication in the future  Contact the office with any concerns or questions  Follow-up in 3 months with lab work completed prior to visit      2  Hypertension:  He is normotensive in the office today  Has not been taking his blood pressure medication recently  Will continue to monitor off  Medication      3  Hyperlipidemia:  LDL cholesterol and triglycerides were elevated  Is not taking atorvastatin at this time  Once again advised him to restart medication  Will continue to monitor over time  CC:  Type 2 diabetes follow-up    History of Present Illness     HPI:  31 y  o  year old male with type 2 diabetes for 4 month, hyperlipidemia, hypertension for follow-up  He was diagnosed December 2020, he was thirsty and had polyuria   He was started on metformin, however between last office visit and today's office visit he was having increased GI symptoms and discontinue medication on his own  [facility] on oral agents at home and takes Trulicity 2 87 mg weekly  This was started 3 weeks ago    Has not been experiencing any side effects   His most recent hemoglobin A1c from January 17, 2022 was 11 6  He denies any polyphagia, and blurry vision   He has significant polyuria and nocturia every hour and polydipsia   He now has only occasional nocturia   He denies numbness or tingling of the feet   He denies chest pain or shortness of breath   He denies neuropathy, nephropathy, retinopathy, heart attack, stroke and claudication but does admit to none  Has not made any lifestyle modifications since last office visit  Overall he is doing well without any concerns      Hypoglycemic episodes: No never  H/o of hypoglycemia causing hospitalization or intervention such as glucagon injection  or ambulance call  No   Hypoglycemia symptoms: never had one  Treatment of hypoglycemia: would eat food  Glucagon:No   Medic alert tag: recommended,Yes       The patient's last eye exam was not yet   The patient's last foot exam was performed at last office visit on January 11, 2021      Blood Sugar/Glucometer/Pump/CGM review:  No blood sugar logs present at today's office visit, but states that blood sugars are typically in the 300s      He has had hypertension in the past, is currently not on any medications   He denies headache or stroke-like symptoms      He has hyperlipidemia and takes atorvastatin 20 mg daily   He denies chest pain or shortness of breath  Review of Systems   Constitutional: Negative for activity change, appetite change, fatigue and unexpected weight change  HENT: Negative for trouble swallowing  Eyes: Negative for visual disturbance  Respiratory: Negative for chest tightness and shortness of breath  Cardiovascular: Negative for chest pain, palpitations and leg swelling  Gastrointestinal: Negative for abdominal pain, diarrhea, nausea and vomiting  Endocrine: Negative for cold intolerance, heat intolerance, polydipsia, polyphagia and polyuria  Genitourinary: Negative for frequency  Skin: Negative for rash and wound  Neurological: Negative for dizziness, weakness, light-headedness, numbness and headaches     Psychiatric/Behavioral: Negative for dysphoric mood and sleep disturbance  The patient is not nervous/anxious  Historical Information   Past Medical History:   Diagnosis Date    GERD (gastroesophageal reflux disease)     Herniated lumbar intervertebral disc      Past Surgical History:   Procedure Laterality Date    ESOPHAGUS SURGERY      EGD with blood vessel clipping     Social History   Social History     Substance and Sexual Activity   Alcohol Use Yes     Social History     Substance and Sexual Activity   Drug Use Not Currently     Social History     Tobacco Use   Smoking Status Current Every Day Smoker    Packs/day: 1 50    Types: Cigarettes   Smokeless Tobacco Former User    Types: Chew     Family History:   Family History   Problem Relation Age of Onset    Alcohol abuse Father     Cirrhosis Father     Hypertension Sister     No Known Problems Son     No Known Problems Son        Meds/Allergies   Current Outpatient Medications   Medication Sig Dispense Refill    Blood Glucose Monitoring Suppl (ONE TOUCH ULTRA 2) w/Device KIT Use to test blood sugars twice a day 1 each 0    glucose blood (OneTouch Ultra) test strip Use 1 each daily Use as instructed 100 each 3    OneTouch Delica Lancets 03G MISC Use daily 100 each 3    atorvastatin (LIPITOR) 20 mg tablet Take 1 tablet (20 mg total) by mouth daily 90 tablet 1    Dulaglutide (Trulicity) 4 11 KQ/9 5JX SOPN Inject 0 5 mL (0 75 mg total) under the skin once a week 2 mL 5    metFORMIN (GLUCOPHAGE-XR) 500 mg 24 hr tablet Take 2 tablets (1,000 mg total) by mouth 2 (two) times a day with meals 360 tablet 1    valsartan (DIOVAN) 160 mg tablet Take 1 tablet (160 mg total) by mouth daily (Patient not taking: Reported on 7/27/2021) 30 tablet 2     No current facility-administered medications for this visit  No Known Allergies    Objective   Vitals: Height 6' 2" (1 88 m)  Physical Exam  Vitals and nursing note reviewed     Constitutional:       General: He is not in acute distress  Appearance: Normal appearance  HENT:      Head: Normocephalic and atraumatic  Eyes:      General: No scleral icterus  Pupils: Pupils are equal, round, and reactive to light  Cardiovascular:      Rate and Rhythm: Normal rate and regular rhythm  Heart sounds: No murmur heard  Pulmonary:      Effort: Pulmonary effort is normal  No respiratory distress  Breath sounds: Normal breath sounds  No wheezing  Musculoskeletal:      Right lower leg: No edema  Left lower leg: No edema  Lymphadenopathy:      Cervical: No cervical adenopathy  Skin:     General: Skin is warm and dry  Neurological:      Mental Status: He is alert and oriented to person, place, and time  Sensory: No sensory deficit  Psychiatric:         Mood and Affect: Mood normal          Behavior: Behavior normal          Thought Content: Thought content normal          The history was obtained from the review of the chart, patient      Lab Results:   Lab Results   Component Value Date/Time    Hemoglobin A1C 11 6 (H) 01/17/2022 01:20 PM    Hemoglobin A1C 7 0 (H) 06/18/2021 01:42 PM    Hemoglobin A1C 7 0 06/18/2021 12:00 AM    Hemoglobin A1C 10 4 (H) 04/01/2021 12:30 PM    BUN 17 01/17/2022 01:20 PM    BUN 19 06/18/2021 01:42 PM    BUN 19 04/01/2021 12:30 PM    Potassium 4 4 01/17/2022 01:20 PM    Potassium 4 4 06/18/2021 01:42 PM    Potassium 4 3 04/01/2021 12:30 PM    Chloride 97 01/17/2022 01:20 PM    Chloride 104 06/18/2021 01:42 PM    Chloride 101 04/01/2021 12:30 PM    CO2 26 01/17/2022 01:20 PM    CO2 23 06/18/2021 01:42 PM    CO2 24 04/01/2021 12:30 PM    Creatinine 1 03 01/17/2022 01:20 PM    Creatinine 1 04 06/18/2021 01:42 PM    Creatinine 1 09 04/01/2021 12:30 PM    AST 25 01/17/2022 01:20 PM    AST 22 06/18/2021 01:42 PM    AST 24 04/01/2021 12:30 PM    ALT 42 01/17/2022 01:20 PM    ALT 35 06/18/2021 01:42 PM    ALT 41 04/01/2021 12:30 PM    Albumin 4 2 01/17/2022 01:20 PM    Albumin 4 1 06/18/2021 01:42 PM    Albumin 4 4 04/01/2021 12:30 PM    Globulin, Total 3 1 01/17/2022 01:20 PM    Globulin, Total 2 5 06/18/2021 01:42 PM    Globulin, Total 2 6 04/01/2021 12:30 PM    HDL 24 (L) 01/17/2022 01:20 PM    HDL 27 (L) 06/18/2021 01:42 PM    Triglycerides 386 (H) 01/17/2022 01:20 PM    Triglycerides 140 06/18/2021 01:42 PM       Portions of the record may have been created with voice recognition software  Occasional wrong word or "sound a like" substitutions may have occurred due to the inherent limitations of voice recognition software  Read the chart carefully and recognize, using context, where substitutions have occurred

## 2022-01-25 ENCOUNTER — APPOINTMENT (OUTPATIENT)
Dept: RADIOLOGY | Facility: CLINIC | Age: 32
End: 2022-01-25
Payer: COMMERCIAL

## 2022-01-25 ENCOUNTER — OFFICE VISIT (OUTPATIENT)
Dept: FAMILY MEDICINE CLINIC | Facility: CLINIC | Age: 32
End: 2022-01-25
Payer: COMMERCIAL

## 2022-01-25 VITALS
RESPIRATION RATE: 18 BRPM | HEART RATE: 98 BPM | SYSTOLIC BLOOD PRESSURE: 128 MMHG | WEIGHT: 315 LBS | OXYGEN SATURATION: 96 % | HEIGHT: 74 IN | TEMPERATURE: 97.3 F | DIASTOLIC BLOOD PRESSURE: 98 MMHG | BODY MASS INDEX: 40.43 KG/M2

## 2022-01-25 DIAGNOSIS — M54.50 ACUTE RIGHT-SIDED LOW BACK PAIN WITHOUT SCIATICA: ICD-10-CM

## 2022-01-25 DIAGNOSIS — M54.50 ACUTE RIGHT-SIDED LOW BACK PAIN WITHOUT SCIATICA: Primary | ICD-10-CM

## 2022-01-25 PROCEDURE — 3074F SYST BP LT 130 MM HG: CPT | Performed by: NURSE PRACTITIONER

## 2022-01-25 PROCEDURE — 3008F BODY MASS INDEX DOCD: CPT | Performed by: NURSE PRACTITIONER

## 2022-01-25 PROCEDURE — 72110 X-RAY EXAM L-2 SPINE 4/>VWS: CPT

## 2022-01-25 PROCEDURE — 4004F PT TOBACCO SCREEN RCVD TLK: CPT | Performed by: NURSE PRACTITIONER

## 2022-01-25 PROCEDURE — 3080F DIAST BP >= 90 MM HG: CPT | Performed by: NURSE PRACTITIONER

## 2022-01-25 PROCEDURE — 99213 OFFICE O/P EST LOW 20 MIN: CPT | Performed by: NURSE PRACTITIONER

## 2022-01-25 PROCEDURE — 3725F SCREEN DEPRESSION PERFORMED: CPT | Performed by: NURSE PRACTITIONER

## 2022-01-25 RX ORDER — METHYLPREDNISOLONE 4 MG/1
TABLET ORAL
COMMUNITY
Start: 2022-01-24 | End: 2022-01-25

## 2022-01-25 RX ORDER — NAPROXEN 500 MG/1
500 TABLET ORAL 2 TIMES DAILY WITH MEALS
Qty: 20 TABLET | Refills: 0 | Status: SHIPPED | OUTPATIENT
Start: 2022-01-25 | End: 2022-08-05 | Stop reason: ALTCHOICE

## 2022-01-25 RX ORDER — METHOCARBAMOL 750 MG/1
750 TABLET, FILM COATED ORAL
Qty: 30 TABLET | Refills: 0 | Status: SHIPPED | OUTPATIENT
Start: 2022-01-25

## 2022-01-25 NOTE — PATIENT INSTRUCTIONS
Salon pas patch   Back Pain   WHAT YOU NEED TO KNOW:   Back pain is common  You may have back pain and muscle spasms  You may feel sore or stiff on one or both sides of your back  The pain may spread to your lower body  Conditions that affect the spine, joints, or muscles can cause back pain  These may include arthritis, spinal stenosis (narrowing of the spinal column), muscle tension, or breakdown of the spinal discs  DISCHARGE INSTRUCTIONS:   Call your local emergency number (911 in the [address] [facility]) if:   · You have severe back pain with chest pain  · You cannot control your urine or bowel movements  · Your pain becomes so severe that you cannot walk  Return to the emergency department if:   · You have pain, numbness, or weakness in one or both legs  · You have severe back pain, nausea, and vomiting  · You have severe back pain that spreads to your side or genital area  Call your doctor if:   · You have back pain that does not get better with rest and pain medicine  · You have a fever  · You have pain that worsens when you are on your back or when you rest     · You have pain that worsens when you cough or sneeze  · You lose weight without trying  · You have questions or concerns about your condition or care  Medicines: You may need any of the following:  · NSAIDs , such as ibuprofen, help decrease swelling, pain, and fever  This medicine is available with or without a doctor's order  NSAIDs can cause stomach bleeding or kidney problems in certain people  If you take blood thinner medicine, always ask your healthcare provider if NSAIDs are safe for you  Always read the medicine label and follow directions  · Acetaminophen  decreases pain and fever  It is available without a doctor's order  Ask how much to take and how often to take it  Follow directions   Read the labels of all other medicines you are using to see if they also contain acetaminophen, or ask your doctor or pharmacist  Acetaminophen can cause liver damage if not taken correctly  Do not use more than 4 grams (4,000 milligrams) total of acetaminophen in one day  · Muscle relaxers  help decrease muscle spasms and back pain  · Prescription pain medicine  may be given  Ask your healthcare provider how to take this medicine safely  Some prescription pain medicines contain acetaminophen  Do not take other medicines that contain acetaminophen without talking to your healthcare provider  Too much acetaminophen may cause liver damage  Prescription pain medicine may cause constipation  Ask your healthcare provider how to prevent or treat constipation  · Take your medicine as directed  Contact your healthcare provider if you think your medicine is not helping or if you have side effects  Tell him or her if you are allergic to any medicine  Keep a list of the medicines, vitamins, and herbs you take  Include the amounts, and when and why you take them  Bring the list or the pill bottles to follow-up visits  Carry your medicine list with you in case of an emergency  How to manage your back pain:   · Apply ice  on your back for 15 to 20 minutes every hour or as directed  Use an ice pack, or put crushed ice in a plastic bag  Cover it with a towel before you apply it to your skin  Ice helps prevent tissue damage and decreases pain  · Apply heat  on your back for 20 to 30 minutes every 2 hours for as many days as directed  Heat helps decrease pain and muscle spasms  · Stay active  as much as you can without causing more pain  Bed rest could make your back pain worse  Avoid heavy lifting until your pain is gone  · Go to physical therapy as directed  A physical therapist can teach you exercises to help improve movement and strength, and to decrease pain  Follow up with your doctor in 2 weeks, or as directed: You might need to see a specialist  Write down your questions so you remember to ask them during your visits    © [facility] 2021 Information is for End User's use only and may not be sold, redistributed or otherwise used for commercial purposes  All illustrations and images included in CareNotes® are the copyrighted property of A D A M [employer] Inc  or [employer]   The above information is an  only  It is not intended as medical advice for individual conditions or treatments  Talk to your doctor, nurse or pharmacist before following any medical regimen to see if it is safe and effective for you

## 2022-01-25 NOTE — PROGRESS NOTES
Assessment/Plan   Problem List Items Addressed This Visit     None      Visit Diagnoses     Acute right-sided low back pain without sciatica    -  Primary    Relevant Medications    naproxen (NAPROSYN) 500 mg tablet    methocarbamol (ROBAXIN) 750 mg tablet    Other Relevant Orders    XR spine lumbar minimum 4 views non injury    Ambulatory Referral to Physical Therapy                Chief Complaint   Patient presents with    Back Pain     Pt reports he injured his lower back over the weekend - unsure how - and has been having back spasms  Pt was seen in urgent care last night and was given steroid pack, has not picked up yet  Subjective   Patient ID: Elda Carrillo is a 36 y o  male  Vitals:    01/25/22 1022   BP: 128/98   Pulse: 98   Resp: 18   Temp: (!) 97 3 °F (36 3 °C)   SpO2: 96%     Patient reports a history of disc bulging and narrowing of spinal canal -MRI "many years ago"  Since there is a previous history I will obtain lumbar films, referral to PT and begin muscle relaxers at HS and naproxen BID     Back Pain  This is a new problem  The current episode started in the past 7 days (woke 1/22 with pain in low back, unknown trauma )  The problem occurs constantly  The problem is unchanged  The pain is present in the lumbar spine  The quality of the pain is described as aching and shooting  The pain does not radiate  The pain is at a severity of 7/10  The pain is moderate  The pain is the same all the time  The symptoms are aggravated by twisting and bending  Pertinent negatives include no abdominal pain, bladder incontinence, chest pain, dysuria, fever, headaches, numbness, paresis, paresthesias, pelvic pain, perianal numbness, tingling or weakness  Risk factors include obesity, sedentary lifestyle and lack of exercise  He has tried heat for the symptoms  The treatment provided no relief         The following portions of the patient's history were reviewed and updated as appropriate: allergies, current medications, past medical history, past social history, past surgical history and problem list     Review of Systems   Constitutional: Negative  Negative for fever  HENT: Negative  Eyes: Negative  Respiratory: Negative  Cardiovascular: Negative  Negative for chest pain  Gastrointestinal: Negative  Negative for abdominal pain  Endocrine: Negative  Genitourinary: Negative  Negative for bladder incontinence, dysuria and pelvic pain  Musculoskeletal: Positive for back pain  Skin: Negative  Allergic/Immunologic: Negative  Neurological: Negative  Negative for tingling, weakness, numbness, headaches and paresthesias  Hematological: Negative  Psychiatric/Behavioral: Negative  Objective     Physical Exam  Vitals and nursing note reviewed  Constitutional:       Appearance: He is obese  He is not ill-appearing  HENT:      Head: Normocephalic and atraumatic  Nose: Nose normal  No congestion  Eyes:      General:         Right eye: No discharge  Left eye: No discharge  Extraocular Movements: Extraocular movements intact  Conjunctiva/sclera: Conjunctivae normal    Cardiovascular:      Rate and Rhythm: Normal rate and regular rhythm  Pulses: Normal pulses  Heart sounds: Normal heart sounds  No murmur heard  Pulmonary:      Effort: Pulmonary effort is normal  No respiratory distress  Breath sounds: Normal breath sounds  No wheezing or rhonchi  Abdominal:      Palpations: Abdomen is soft  Tenderness: There is no right CVA tenderness or left CVA tenderness  Musculoskeletal:         General: No tenderness  Normal range of motion  Cervical back: Normal range of motion  Lumbar back: Normal  No edema, spasms or bony tenderness  Normal range of motion  Negative right straight leg raise test and negative left straight leg raise test  No scoliosis  Back:       Right lower leg: No edema        Left lower leg: No edema    Skin:     General: Skin is warm and dry  Capillary Refill: Capillary refill takes less than 2 seconds  Neurological:      Mental Status: He is alert and oriented to person, place, and time  Psychiatric:         Mood and Affect: Mood normal          Behavior: Behavior normal          Thought Content:  Thought content normal          Judgment: Judgment normal      No Known Allergies

## 2022-02-24 ENCOUNTER — TELEPHONE (OUTPATIENT)
Dept: ENDOCRINOLOGY | Facility: HOSPITAL | Age: 32
End: 2022-02-24

## 2022-02-24 NOTE — TELEPHONE ENCOUNTER
Patient called  He notes that at his last visit you had him increase his Trulicity to 1 5 mg weekly and then call a few weeks later so you could increase the dose again   He would like to know what you would like to increase his dose to?

## 2022-02-25 NOTE — TELEPHONE ENCOUNTER
Spoke with patient  He reports blood sugars have been 260-280 in the morning, blood sugars in the evening are 260-360  Patient would like dose increased

## 2022-03-28 ENCOUNTER — TELEPHONE (OUTPATIENT)
Dept: ENDOCRINOLOGY | Facility: HOSPITAL | Age: 32
End: 2022-03-28

## 2022-03-28 DIAGNOSIS — E11.65 TYPE 2 DIABETES MELLITUS WITH HYPERGLYCEMIA, WITHOUT LONG-TERM CURRENT USE OF INSULIN (HCC): ICD-10-CM

## 2022-03-28 RX ORDER — DULAGLUTIDE 3 MG/.5ML
3 INJECTION, SOLUTION SUBCUTANEOUS WEEKLY
Qty: 2 ML | Refills: 5 | Status: SHIPPED | OUTPATIENT
Start: 2022-03-28

## 2022-04-23 LAB
ALBUMIN SERPL-MCNC: 4.3 G/DL (ref 4.1–5.2)
ALBUMIN/GLOB SERPL: 1.6 {RATIO} (ref 1.2–2.2)
ALP SERPL-CCNC: 71 IU/L (ref 44–121)
ALT SERPL-CCNC: 24 IU/L (ref 0–44)
AST SERPL-CCNC: 18 IU/L (ref 0–40)
BILIRUB SERPL-MCNC: 0.4 MG/DL (ref 0–1.2)
BUN SERPL-MCNC: 15 MG/DL (ref 6–20)
BUN/CREAT SERPL: 14 (ref 9–20)
CALCIUM SERPL-MCNC: 9.6 MG/DL (ref 8.7–10.2)
CHLORIDE SERPL-SCNC: 100 MMOL/L (ref 96–106)
CHOLEST SERPL-MCNC: 230 MG/DL (ref 100–199)
CO2 SERPL-SCNC: 21 MMOL/L (ref 20–29)
CREAT SERPL-MCNC: 1.06 MG/DL (ref 0.76–1.27)
EGFR: 97 ML/MIN/1.73
GLOBULIN SER-MCNC: 2.7 G/DL (ref 1.5–4.5)
GLUCOSE SERPL-MCNC: 98 MG/DL (ref 65–99)
HBA1C MFR BLD: 9.3 % (ref 4.8–5.6)
HDLC SERPL-MCNC: 27 MG/DL
LDLC SERPL CALC-MCNC: 163 MG/DL (ref 0–99)
POTASSIUM SERPL-SCNC: 4.1 MMOL/L (ref 3.5–5.2)
PROT SERPL-MCNC: 7 G/DL (ref 6–8.5)
SL AMB VLDL CHOLESTEROL CALC: 40 MG/DL (ref 5–40)
SODIUM SERPL-SCNC: 140 MMOL/L (ref 134–144)
TRIGL SERPL-MCNC: 211 MG/DL (ref 0–149)

## 2022-05-02 ENCOUNTER — OFFICE VISIT (OUTPATIENT)
Dept: ENDOCRINOLOGY | Facility: HOSPITAL | Age: 32
End: 2022-05-02
Payer: COMMERCIAL

## 2022-05-02 VITALS
HEART RATE: 112 BPM | DIASTOLIC BLOOD PRESSURE: 80 MMHG | HEIGHT: 74 IN | WEIGHT: 315 LBS | SYSTOLIC BLOOD PRESSURE: 122 MMHG | BODY MASS INDEX: 40.43 KG/M2

## 2022-05-02 DIAGNOSIS — E78.2 MIXED HYPERLIPIDEMIA: ICD-10-CM

## 2022-05-02 DIAGNOSIS — I10 ESSENTIAL HYPERTENSION: ICD-10-CM

## 2022-05-02 DIAGNOSIS — E11.65 TYPE 2 DIABETES MELLITUS WITH HYPERGLYCEMIA, WITHOUT LONG-TERM CURRENT USE OF INSULIN (HCC): Primary | ICD-10-CM

## 2022-05-02 PROCEDURE — 4004F PT TOBACCO SCREEN RCVD TLK: CPT | Performed by: PHYSICIAN ASSISTANT

## 2022-05-02 PROCEDURE — 3074F SYST BP LT 130 MM HG: CPT | Performed by: PHYSICIAN ASSISTANT

## 2022-05-02 PROCEDURE — 3008F BODY MASS INDEX DOCD: CPT | Performed by: PHYSICIAN ASSISTANT

## 2022-05-02 PROCEDURE — 3079F DIAST BP 80-89 MM HG: CPT | Performed by: PHYSICIAN ASSISTANT

## 2022-05-02 PROCEDURE — 99214 OFFICE O/P EST MOD 30 MIN: CPT | Performed by: PHYSICIAN ASSISTANT

## 2022-05-02 NOTE — PROGRESS NOTES
Aaron Inman 29 y o  male MRN: [id number]    Encounter: [id number]      Assessment/Plan     Assessment: This is a 29y o -year-old male with type 2 diabetes with hypertension and hyperlipidemia  Plan:  1   Type 2 diabetes:  Most recent hemoglobin A1c has decreased to 9 3, but is still above goal   His Trulicity was increased to 3 mg weekly less than a month ago prior to getting his A1c completed  Blood sugars are doing well at this time, but I recommend checking blood sugar at least 2 times a day at varying times to get a better idea of how they are trending  Continue with lifestyle modifications to help improve glucose levels  Contact the office with any concerns or questions  Follow-up in 3 months with lab work completed prior to visit         2   Hypertension: [facility] is normotensive in the office today   Has not been taking his blood pressure medication recently  Kevin Myles continue to monitor off  Medication      3   Hyperlipidemia:  Triglycerides have improved, but LDL increased  This likely due to not taking his atorvastatin  Recommend restarting  Will repeat lipid panel prior to next office visit  CC: Diabetes    History of Present Illness     HPI:  29year old male with type 2 diabetes, hyperlipidemia, hypertension for follow-up  He was diagnosed December 2020, he was thirsty and had polyuria   He is on oral agents at home and takes Trulicity 1 5 mg weekly  Has not been experiencing any side effects with the Trulicity  He was previously on metformin, but developed GI symptoms and it was discontinued  His most recent hemoglobin A1c from April 22, 2022 was 9 3   He denies any polyphagia, and blurry vision   He has significant polyuria and nocturia every hour and polydipsia   He now has only occasional nocturia   He denies numbness or tingling of the feet   He denies chest pain or shortness of breath   He denies neuropathy, nephropathy, retinopathy, heart attack, stroke and claudication    Overall he is doing well without any concerns      Hypoglycemic episodes: No never  H/o of hypoglycemia causing hospitalization or intervention such as glucagon injection  or ambulance call  No   Hypoglycemia symptoms: never had one  Treatment of hypoglycemia: would eat food  Glucagon:No   Medic alert tag: recommended,Yes       The patient's last eye exam was not yet   The patient's last foot exam was performed at last office visit on January 11, 2021      Blood Sugar/Glucometer/Pump/CGM review:  Is checking blood sugar typically once a day  Does check at variable times  Average glucose from April 18 through May 2, 2022 was 151 with lowest glucose being 96 and highest being 188       He has had hypertension in the past, is currently not on any medications   He denies headache or stroke-like symptoms      He has hyperlipidemia and takes atorvastatin 20 mg daily   He denies chest pain or shortness of breath  Review of Systems   Constitutional: Negative for activity change, appetite change, fatigue and unexpected weight change  HENT: Negative for trouble swallowing  Eyes: Negative for visual disturbance  Respiratory: Negative for chest tightness and shortness of breath  Cardiovascular: Negative for chest pain, palpitations and leg swelling  Gastrointestinal: Negative for abdominal pain, diarrhea, nausea and vomiting  Endocrine: Negative for cold intolerance, heat intolerance, polydipsia, polyphagia and polyuria  Genitourinary: Negative for frequency  Skin: Negative for rash and wound  Neurological: Negative for dizziness, weakness, light-headedness, numbness and headaches  Psychiatric/Behavioral: Negative for dysphoric mood and sleep disturbance  The patient is not nervous/anxious          Historical Information   Past Medical History:   Diagnosis Date    GERD (gastroesophageal reflux disease)     Herniated lumbar intervertebral disc      Past Surgical History:   Procedure Laterality Date    ESOPHAGUS SURGERY      EGD with blood vessel clipping     Social History   Social History     Substance and Sexual Activity   Alcohol Use Yes     Social History     Substance and Sexual Activity   Drug Use Not Currently     Social History     Tobacco Use   Smoking Status Current Every Day Smoker    Packs/day: 1 50    Types: Cigarettes   Smokeless Tobacco Former User    Types: Chew     Family History:   Family History   Problem Relation Age of Onset    Alcohol abuse Father     Cirrhosis Father     Hypertension Sister     No Known Problems Son     No Known Problems Son        Meds/Allergies   Current Outpatient Medications   Medication Sig Dispense Refill    atorvastatin (LIPITOR) 20 mg tablet Take 1 tablet (20 mg total) by mouth daily 90 tablet 1    Blood Glucose Monitoring Suppl (ONE TOUCH ULTRA 2) w/Device KIT Use to test blood sugars twice a day 1 each 0    Dulaglutide (Trulicity) 3 GT/5 8KF SOPN Inject 0 5 mL (3 mg total) under the skin once a week 2 mL 5    glucose blood (OneTouch Ultra) test strip Use 1 each daily Use as instructed 100 each 3    methocarbamol (ROBAXIN) 750 mg tablet Take 1 tablet (750 mg total) by mouth daily at bedtime as needed for muscle spasms 30 tablet 0    naproxen (NAPROSYN) 500 mg tablet Take 1 tablet (500 mg total) by mouth 2 (two) times a day with meals for 10 days 20 tablet 0    OneTouch Delica Lancets 92U MISC Use daily 100 each 3     No current facility-administered medications for this visit  No Known Allergies    Objective   Vitals: There were no vitals taken for this visit  Physical Exam  Vitals and nursing note reviewed  Constitutional:       General: He is not in acute distress  Appearance: Normal appearance  He is not diaphoretic  HENT:      Head: Normocephalic and atraumatic  Eyes:      General: No scleral icterus  Extraocular Movements: Extraocular movements intact        Conjunctiva/sclera: Conjunctivae normal       Pupils: Pupils are equal, round, and reactive to light  Cardiovascular:      Rate and Rhythm: Normal rate and regular rhythm  Pulses: no weak pulses          Dorsalis pedis pulses are 2+ on the right side and 2+ on the left side  Posterior tibial pulses are 2+ on the right side and 2+ on the left side  Heart sounds: No murmur heard  Pulmonary:      Effort: Pulmonary effort is normal  No respiratory distress  Breath sounds: Normal breath sounds  No wheezing  Musculoskeletal:      Cervical back: Normal range of motion  Right lower leg: No edema  Left lower leg: No edema  Feet:      Right foot:      Skin integrity: No ulcer, skin breakdown, erythema, warmth, callus or dry skin  Left foot:      Skin integrity: No ulcer, skin breakdown, erythema, warmth, callus or dry skin  Lymphadenopathy:      Cervical: No cervical adenopathy  Skin:     General: Skin is warm and dry  Neurological:      Mental Status: He is alert and oriented to person, place, and time  Mental status is at baseline  Sensory: No sensory deficit  Gait: Gait normal    Psychiatric:         Mood and Affect: Mood normal          Behavior: Behavior normal          Thought Content: Thought content normal      Patient's shoes and socks removed  Right Foot/Ankle   Right Foot Inspection  Skin Exam: skin normal and skin intact  No dry skin, no warmth, no callus, no erythema, no maceration, no abnormal color, no pre-ulcer, no ulcer and no callus  Toe Exam: ROM and strength within normal limits  No tenderness, erythema and  no right toe deformity    Sensory   Vibration: intact  Proprioception: intact  Monofilament testing: intact    Vascular  Capillary refills: < 3 seconds  The right DP pulse is 2+  The right PT pulse is 2+  Left Foot/Ankle  Left Foot Inspection  Skin Exam: skin normal and skin intact  No dry skin, no warmth, no erythema, no maceration, normal color, no pre-ulcer, no ulcer and no callus       Toe Exam: ROM and strength within normal limits  No tenderness, no erythema and no left toe deformity  Sensory   Vibration: intact  Proprioception: intact  Monofilament testing: intact    Vascular  Capillary refills: < 3 seconds  The left DP pulse is 2+  The left PT pulse is 2+  Assign Risk Category  No deformity present  No loss of protective sensation  No weak pulses  Risk: 0        The history was obtained from the review of the chart, patient  Lab Results:   Lab Results   Component Value Date/Time    Hemoglobin A1C 9 3 (H) 04/22/2022 02:20 PM    Hemoglobin A1C 11 6 (H) 01/17/2022 01:20 PM    Hemoglobin A1C 7 0 (H) 06/18/2021 01:42 PM    BUN 15 04/22/2022 02:20 PM    BUN 17 01/17/2022 01:20 PM    BUN 19 06/18/2021 01:42 PM    Potassium 4 1 04/22/2022 02:20 PM    Potassium 4 4 01/17/2022 01:20 PM    Potassium 4 4 06/18/2021 01:42 PM    Chloride 100 04/22/2022 02:20 PM    Chloride 97 01/17/2022 01:20 PM    Chloride 104 06/18/2021 01:42 PM    CO2 21 04/22/2022 02:20 PM    CO2 26 01/17/2022 01:20 PM    CO2 23 06/18/2021 01:42 PM    Creatinine 1 06 04/22/2022 02:20 PM    Creatinine 1 03 01/17/2022 01:20 PM    Creatinine 1 04 06/18/2021 01:42 PM    AST 18 04/22/2022 02:20 PM    AST 25 01/17/2022 01:20 PM    AST 22 06/18/2021 01:42 PM    ALT 24 04/22/2022 02:20 PM    ALT 42 01/17/2022 01:20 PM    ALT 35 06/18/2021 01:42 PM    Albumin 4 3 04/22/2022 02:20 PM    Albumin 4 2 01/17/2022 01:20 PM    Albumin 4 1 06/18/2021 01:42 PM    Globulin, Total 2 7 04/22/2022 02:20 PM    Globulin, Total 3 1 01/17/2022 01:20 PM    Globulin, Total 2 5 06/18/2021 01:42 PM    HDL 27 (L) 04/22/2022 02:20 PM    HDL 24 (L) 01/17/2022 01:20 PM    HDL 27 (L) 06/18/2021 01:42 PM    Triglycerides 211 (H) 04/22/2022 02:20 PM    Triglycerides 386 (H) 01/17/2022 01:20 PM    Triglycerides 140 06/18/2021 01:42 PM         Portions of the record may have been created with voice recognition software   Occasional wrong word or "sound a like" substitutions may have occurred due to the inherent limitations of voice recognition software  Read the chart carefully and recognize, using context, where substitutions have occurred

## 2022-05-02 NOTE — PATIENT INSTRUCTIONS
Continue monitor diet, maintain physical activity   Make sure to drink plenty of water throughout the day        Continue with Trulicity 3 mg weekly      Recommend restarting atorvastatin      Call with any concerns with blood sugars      Follow up in 3 months with lab work prior to visit

## 2022-06-01 ENCOUNTER — TELEPHONE (OUTPATIENT)
Dept: ENDOCRINOLOGY | Facility: HOSPITAL | Age: 32
End: 2022-06-01

## 2022-06-01 NOTE — TELEPHONE ENCOUNTER
Patient left a message  He notes that the Trulicity is very expensive and he can no longer afford it   He has been out for a few days and would like to know if there is something else that he could take in place of it

## 2022-07-05 DIAGNOSIS — E11.9 TYPE 2 DIABETES MELLITUS WITHOUT COMPLICATION, WITHOUT LONG-TERM CURRENT USE OF INSULIN (HCC): ICD-10-CM

## 2022-07-05 RX ORDER — BLOOD SUGAR DIAGNOSTIC
STRIP MISCELLANEOUS
Qty: 50 STRIP | Refills: 7 | Status: SHIPPED | OUTPATIENT
Start: 2022-07-05

## 2022-07-30 LAB
ALBUMIN SERPL-MCNC: 4.6 G/DL (ref 4.1–5.2)
ALBUMIN/GLOB SERPL: 1.8 {RATIO} (ref 1.2–2.2)
ALP SERPL-CCNC: 74 IU/L (ref 44–121)
ALT SERPL-CCNC: 21 IU/L (ref 0–44)
AST SERPL-CCNC: 16 IU/L (ref 0–40)
BILIRUB SERPL-MCNC: 0.3 MG/DL (ref 0–1.2)
BUN SERPL-MCNC: 22 MG/DL (ref 6–20)
BUN/CREAT SERPL: 20 (ref 9–20)
CALCIUM SERPL-MCNC: 10.3 MG/DL (ref 8.7–10.2)
CHLORIDE SERPL-SCNC: 100 MMOL/L (ref 96–106)
CHOLEST SERPL-MCNC: 219 MG/DL (ref 100–199)
CO2 SERPL-SCNC: 26 MMOL/L (ref 20–29)
CREAT SERPL-MCNC: 1.11 MG/DL (ref 0.76–1.27)
EGFR: 92 ML/MIN/1.73
GLOBULIN SER-MCNC: 2.6 G/DL (ref 1.5–4.5)
GLUCOSE SERPL-MCNC: 186 MG/DL (ref 65–99)
HBA1C MFR BLD: 9.2 % (ref 4.8–5.6)
HDLC SERPL-MCNC: 23 MG/DL
LDLC SERPL CALC-MCNC: 114 MG/DL (ref 0–99)
POTASSIUM SERPL-SCNC: 4.5 MMOL/L (ref 3.5–5.2)
PROT SERPL-MCNC: 7.2 G/DL (ref 6–8.5)
SL AMB VLDL CHOLESTEROL CALC: 82 MG/DL (ref 5–40)
SODIUM SERPL-SCNC: 140 MMOL/L (ref 134–144)
TRIGL SERPL-MCNC: 468 MG/DL (ref 0–149)
TSH SERPL DL<=0.005 MIU/L-ACNC: 2.15 UIU/ML (ref 0.45–4.5)

## 2022-08-05 ENCOUNTER — OFFICE VISIT (OUTPATIENT)
Dept: ENDOCRINOLOGY | Facility: HOSPITAL | Age: 32
End: 2022-08-05
Payer: COMMERCIAL

## 2022-08-05 ENCOUNTER — OFFICE VISIT (OUTPATIENT)
Dept: FAMILY MEDICINE CLINIC | Facility: CLINIC | Age: 32
End: 2022-08-05
Payer: COMMERCIAL

## 2022-08-05 VITALS
OXYGEN SATURATION: 96 % | SYSTOLIC BLOOD PRESSURE: 134 MMHG | RESPIRATION RATE: 18 BRPM | DIASTOLIC BLOOD PRESSURE: 86 MMHG | WEIGHT: 315 LBS | TEMPERATURE: 98.3 F | HEIGHT: 74 IN | HEART RATE: 102 BPM | BODY MASS INDEX: 40.43 KG/M2

## 2022-08-05 VITALS
BODY MASS INDEX: 40.43 KG/M2 | DIASTOLIC BLOOD PRESSURE: 78 MMHG | WEIGHT: 315 LBS | SYSTOLIC BLOOD PRESSURE: 134 MMHG | HEIGHT: 74 IN | HEART RATE: 104 BPM

## 2022-08-05 DIAGNOSIS — I10 ESSENTIAL HYPERTENSION: ICD-10-CM

## 2022-08-05 DIAGNOSIS — F32.A DEPRESSION, UNSPECIFIED DEPRESSION TYPE: Primary | ICD-10-CM

## 2022-08-05 DIAGNOSIS — E11.65 TYPE 2 DIABETES MELLITUS WITH HYPERGLYCEMIA, WITHOUT LONG-TERM CURRENT USE OF INSULIN (HCC): Primary | ICD-10-CM

## 2022-08-05 DIAGNOSIS — E78.2 MIXED HYPERLIPIDEMIA: ICD-10-CM

## 2022-08-05 DIAGNOSIS — F41.9 ANXIETY: ICD-10-CM

## 2022-08-05 PROCEDURE — 99214 OFFICE O/P EST MOD 30 MIN: CPT | Performed by: NURSE PRACTITIONER

## 2022-08-05 PROCEDURE — 3079F DIAST BP 80-89 MM HG: CPT | Performed by: NURSE PRACTITIONER

## 2022-08-05 PROCEDURE — 3725F SCREEN DEPRESSION PERFORMED: CPT | Performed by: NURSE PRACTITIONER

## 2022-08-05 PROCEDURE — 99214 OFFICE O/P EST MOD 30 MIN: CPT | Performed by: PHYSICIAN ASSISTANT

## 2022-08-05 PROCEDURE — 3075F SYST BP GE 130 - 139MM HG: CPT | Performed by: NURSE PRACTITIONER

## 2022-08-05 RX ORDER — ESCITALOPRAM OXALATE 10 MG/1
10 TABLET ORAL DAILY
Qty: 30 TABLET | Refills: 3 | Status: SHIPPED | OUTPATIENT
Start: 2022-08-05

## 2022-08-05 RX ORDER — ATORVASTATIN CALCIUM 20 MG/1
20 TABLET, FILM COATED ORAL DAILY
Qty: 90 TABLET | Refills: 1 | Status: SHIPPED | OUTPATIENT
Start: 2022-08-05

## 2022-08-05 RX ORDER — GLIMEPIRIDE 2 MG/1
2 TABLET ORAL
Qty: 180 TABLET | Refills: 1 | Status: SHIPPED | OUTPATIENT
Start: 2022-08-05

## 2022-08-05 NOTE — PROGRESS NOTES
Kathryn Nurse 29 y o  male MRN: [id number]    Encounter: [id number]      Assessment/Plan     Assessment: This is a 29y o -year-old male with type 2 diabetes with hypertension and hyperlipidemia  Plan:  1   Type 2 diabetes:  Most recent hemoglobin A1c was 9 2  Was without his Trulicity for quite a while  Only in the last 2 weeks has he restarted it  He will likely need an additional medication, but concerned about cost of the other medications  At this time we will start glimepiride 2 mg with breakfast and dinner  Discussed side effects medication  Contact the office if there is any concerns or questions  Recommend checking blood sugar least twice a day  Make sure to bring blood sugar logs in the next office visit  Continue with lifestyle modifications  Follow-up in 3 months with lab work completed prior to visit          2   Hypertension: [facility] is normotensive in the office today   Has not been taking his blood pressure medication recently  Ramu Vazquez continue to monitor off  Medication      3   Hyperlipidemia:  LDL and total cholesterol have improved, but triglycerides were elevated  This is likely due to being without Trulicity in having elevated glucose levels  Continue with current medications  Will continue monitor over time  CC:  Type 2 diabetes follow-up    History of Present Illness     HPI:  29year old male with type 2 diabetes, hyperlipidemia, hypertension for follow-up  He was diagnosed December 2020, he was thirsty and had polyuria   He is on oral agents at home and takes Trulicity 3 mg weekly  Has not been experiencing any side effects with the Trulicity  He was previously on metformin, but developed GI symptoms and it was discontinued    His most recent hemoglobin A1c from July 29, 2022 was 9 2   He denies any polyphagia, and blurry vision   He has significant polyuria and nocturia every hour and polydipsia   He now has only occasional nocturia   He denies numbness or tingling of the Shayna Torres denies chest pain or shortness of breath   He denies neuropathy, nephropathy, retinopathy, heart attack, stroke and claudication  Overall he is doing well without any concerns  There was an increase in cost of his Trulicity  Because of this he was without medication for a while  He just restarted his Trulicity 2 weeks ago      Hypoglycemic episodes: No never  H/o of hypoglycemia causing hospitalization or intervention such as glucagon injection  or ambulance call  No   Hypoglycemia symptoms: never had one  Treatment of hypoglycemia: would eat food  Glucagon:No   Medic alert tag: recommended,Yes       The patient's last eye exam was not yet   The patient's last foot exam was performed at last office visit on May 2022       Blood Sugar/Glucometer/Pump/CGM review:  No blood sugar logs present at today's office visit      He has had hypertension in the past, is currently not on any medications   He denies headache or stroke-like symptoms      He has hyperlipidemia and takes atorvastatin 20 mg daily   He denies chest pain or shortness of breath  Review of Systems   Constitutional: Negative for activity change, appetite change, fatigue and unexpected weight change  HENT: Negative for trouble swallowing  Eyes: Negative for visual disturbance  Respiratory: Negative for chest tightness and shortness of breath  Cardiovascular: Negative for chest pain, palpitations and leg swelling  Gastrointestinal: Negative for abdominal pain, diarrhea, nausea and vomiting  Endocrine: Negative for cold intolerance, heat intolerance, polydipsia, polyphagia and polyuria  Genitourinary: Negative for frequency  Skin: Negative for rash and wound  Neurological: Negative for dizziness, weakness, light-headedness, numbness and headaches  Psychiatric/Behavioral: Negative for dysphoric mood and sleep disturbance  The patient is not nervous/anxious          Historical Information   Past Medical History: Diagnosis Date    GERD (gastroesophageal reflux disease)     Herniated lumbar intervertebral disc      Past Surgical History:   Procedure Laterality Date    ESOPHAGUS SURGERY      EGD with blood vessel clipping     Social History   Social History     Substance and Sexual Activity   Alcohol Use Yes     Social History     Substance and Sexual Activity   Drug Use Not Currently     Social History     Tobacco Use   Smoking Status Current Every Day Smoker    Packs/day: 1 50    Types: Cigarettes   Smokeless Tobacco Former User    Types: Chew     Family History:   Family History   Problem Relation Age of Onset    Alcohol abuse Father     Cirrhosis Father     Hypertension Sister     No Known Problems Son     No Known Problems Son        Meds/Allergies   Current Outpatient Medications   Medication Sig Dispense Refill    Blood Glucose Monitoring Suppl (ONE TOUCH ULTRA 2) w/Device KIT Use to test blood sugars twice a day 1 each 0    Dulaglutide (Trulicity) 3 JA/8 2ZH SOPN Inject 0 5 mL (3 mg total) under the skin once a week 2 mL 5    glimepiride (AMARYL) 2 mg tablet Take 1 tablet (2 mg total) by mouth 2 (two) times a day before meals 180 tablet 1    OneTouch Delica Lancets 93V MISC Use daily 100 each 3    OneTouch Ultra test strip USE 1 EACH DAILY AS INSTRUCTED 50 strip 7    atorvastatin (LIPITOR) 20 mg tablet Take 1 tablet (20 mg total) by mouth daily (Patient not taking: No sig reported) 90 tablet 1    methocarbamol (ROBAXIN) 750 mg tablet Take 1 tablet (750 mg total) by mouth daily at bedtime as needed for muscle spasms (Patient not taking: No sig reported) 30 tablet 0    naproxen (NAPROSYN) 500 mg tablet Take 1 tablet (500 mg total) by mouth 2 (two) times a day with meals for 10 days 20 tablet 0     No current facility-administered medications for this visit       Allergies   Allergen Reactions    Metformin GI Intolerance       Objective   Vitals: Blood pressure 134/78, pulse 104, height 6' 2" (1 88 m), weight (!) 173 kg (381 lb)  Physical Exam  Vitals and nursing note reviewed  Constitutional:       General: He is not in acute distress  Appearance: Normal appearance  HENT:      Head: Normocephalic and atraumatic  Eyes:      General: No scleral icterus  Pupils: Pupils are equal, round, and reactive to light  Cardiovascular:      Rate and Rhythm: Normal rate and regular rhythm  Heart sounds: No murmur heard  Pulmonary:      Effort: Pulmonary effort is normal  No respiratory distress  Breath sounds: Normal breath sounds  No wheezing  Musculoskeletal:      Right lower leg: No edema  Left lower leg: No edema  Lymphadenopathy:      Cervical: No cervical adenopathy  Skin:     General: Skin is warm and dry  Neurological:      Mental Status: He is alert and oriented to person, place, and time  Sensory: No sensory deficit  Psychiatric:         Mood and Affect: Mood normal          Behavior: Behavior normal          Thought Content: Thought content normal          The history was obtained from the review of the chart, patient      Lab Results:   Lab Results   Component Value Date/Time    Hemoglobin A1C 9 2 (H) 07/29/2022 07:31 AM    Hemoglobin A1C 9 3 (H) 04/22/2022 02:20 PM    Hemoglobin A1C 11 6 (H) 01/17/2022 01:20 PM    BUN 22 (H) 07/29/2022 07:31 AM    BUN 15 04/22/2022 02:20 PM    BUN 17 01/17/2022 01:20 PM    Potassium 4 5 07/29/2022 07:31 AM    Potassium 4 1 04/22/2022 02:20 PM    Potassium 4 4 01/17/2022 01:20 PM    Chloride 100 07/29/2022 07:31 AM    Chloride 100 04/22/2022 02:20 PM    Chloride 97 01/17/2022 01:20 PM    CO2 26 07/29/2022 07:31 AM    CO2 21 04/22/2022 02:20 PM    CO2 26 01/17/2022 01:20 PM    Creatinine 1 11 07/29/2022 07:31 AM    Creatinine 1 06 04/22/2022 02:20 PM    Creatinine 1 03 01/17/2022 01:20 PM    AST 16 07/29/2022 07:31 AM    AST 18 04/22/2022 02:20 PM    AST 25 01/17/2022 01:20 PM    ALT 21 07/29/2022 07:31 AM    ALT 24 04/22/2022 02:20 PM    ALT 42 01/17/2022 01:20 PM    Albumin 4 6 07/29/2022 07:31 AM    Albumin 4 3 04/22/2022 02:20 PM    Albumin 4 2 01/17/2022 01:20 PM    Globulin, Total 2 6 07/29/2022 07:31 AM    Globulin, Total 2 7 04/22/2022 02:20 PM    Globulin, Total 3 1 01/17/2022 01:20 PM    HDL 23 (L) 07/29/2022 07:31 AM    HDL 27 (L) 04/22/2022 02:20 PM    HDL 24 (L) 01/17/2022 01:20 PM    Triglycerides 468 (H) 07/29/2022 07:31 AM    Triglycerides 211 (H) 04/22/2022 02:20 PM    Triglycerides 386 (H) 01/17/2022 01:20 PM         Portions of the record may have been created with voice recognition software  Occasional wrong word or "sound a like" substitutions may have occurred due to the inherent limitations of voice recognition software  Read the chart carefully and recognize, using context, where substitutions have occurred

## 2022-08-05 NOTE — PATIENT INSTRUCTIONS
Continue monitor diet, maintain physical activity  Make sure to drink plenty of water throughout the day  Continue with Trulicity 3 mg weekly  Start glimepiride 2 mg with breakfast and dinner  Continue atorvastatin  Call with any concerns with blood sugars  Follow up in 3 months with lab work prior to visit

## 2022-08-05 NOTE — PATIENT INSTRUCTIONS
Start Lexapro antidepressant and monitor for side effects  Resume Atorvastatin medication for prevention of heart disease  Depression   WHAT YOU NEED TO KNOW:   Depression is a medical condition that causes feelings of sadness or hopelessness that do not go away  Depression may cause you to lose interest in things you used to enjoy  These feelings may interfere with your daily life  DISCHARGE INSTRUCTIONS:   Call your local emergency number (911 in the [address] [place] [address] Rd[address]) if:   You think about harming yourself or someone else  You have done something on purpose to hurt yourself  Call your therapist or doctor if:   Your symptoms do not improve  You cannot make it to your next appointment  You have new symptoms  You have questions or concerns about your condition or care  The following resources are available at any time to help you, if needed:   [address] [facility]: [phone] (0-560-100-OEPY)     Suicide Hotline: 6-261.146.6313 (2-959-GJLSEMU)     For a list of international numbers: https://save org/find-help/international-resources/    Medicines:   Antidepressants  may be given to improve or balance your mood  You may need to take this medicine for several weeks before you begin to feel better  Take your medicine as directed  Contact your healthcare provider if you think your medicine is not helping or if you have side effects  Tell him of her if you are allergic to any medicine  Keep a list of the medicines, vitamins, and herbs you take  Include the amounts, and when and why you take them  Bring the list or the pill bottles to follow-up visits  Carry your medicine list with you in case of an emergency  Therapy  is often used together with medicine to relieve depression  Therapy is a way for you to talk about your feelings and anything that may be causing depression  Therapy can be done alone or in a group   It may also be done with family members or a significant other   Self-care:   Get regular physical activity  Try to be active for 30 minutes, 3 to 5 days a week  Physical activity can help relieve depression  Work with your healthcare provider to develop a plan that you enjoy  It may help to ask someone to be active with you  Create a regular sleep schedule  A routine can help you relax before bed  Listen to music, read, or do yoga  Try to go to bed and wake up at the same time every day  Sleep is important for emotional health  Eat a variety of healthy foods  Healthy foods include fruits, vegetables, whole-grain breads, low-fat dairy products, lean meats, fish, and cooked beans  A healthy meal plan is low in fat, salt, and added sugar  Do not drink alcohol or use drugs  Alcohol and drugs can make depression worse  Talk to your therapist or doctor if you need help quitting  Follow up with your healthcare provider as directed: Your healthcare provider will monitor your progress at follow-up visits  He or she will also monitor your medicine if you take antidepressants  Your healthcare provider will ask if the medicine is helping  Tell him or her about any side effects or problems you may have with your medicine  The type or amount of medicine may need to be changed  Write down your questions so you remember to ask them during your visits  © Copyright [employer] 2022 Information is for End User's use only and may not be sold, redistributed or otherwise used for commercial purposes  All illustrations and images included in CareNotes® are the copyrighted property of [employer]  or [employer]   The above information is an  only  It is not intended as medical advice for individual conditions or treatments  Talk to your doctor, nurse or pharmacist before following any medical regimen to see if it is safe and effective for you

## 2022-08-05 NOTE — PROGRESS NOTES
Assessment/Plan:    No problem-specific Assessment & Plan notes found for this encounter  Problem List Items Addressed This Visit        Other    Mixed hyperlipidemia    Relevant Medications    atorvastatin (LIPITOR) 20 mg tablet    Anxiety    Relevant Medications    escitalopram (Lexapro) 10 mg tablet    Depression - Primary    Relevant Medications    escitalopram (Lexapro) 10 mg tablet            Subjective:      Patient ID: Mariana Phelps is a 29 y o  male  Patient here today for depression  Patient reports he has been depressed for many years but has never been treated  Patient would like to try treatment since he is tired of ignoring the depression  Patient feels like depression has been worse more recently  Patient reports life in general can be stressful which worsens the depression  Patient reports he has some anxiety at times but reports he lakia well with that  Patient denies suicidal ideations  Patient offered counseling services but he is not interested in that since it didn't work for him when he went as a kid  Patient is open to trying medication  Patient reports he also saw Endocrine today and was started on Glimepiride for a HgbA1c of 9 2  Patient reports he stopped his Atorvastatin on his own  Discussed the importance of staying on Atorvastatin to prevent heart disease since he is at higher risk due to the diabetes  Atorvastatin reordered and patient agreeable to restarting  The following portions of the patient's history were reviewed and updated as appropriate: allergies, current medications, past family history, past medical history, past social history, past surgical history and problem list     Review of Systems   Constitutional: Negative  Negative for chills, fatigue and fever  HENT: Negative  Negative for congestion, ear discharge, ear pain, rhinorrhea, sinus pressure, sinus pain and sore throat  Eyes: Negative  Negative for pain, discharge and visual disturbance  Respiratory: Negative  Negative for cough, chest tightness, shortness of breath and wheezing  Cardiovascular: Negative  Negative for chest pain, palpitations and leg swelling  Gastrointestinal: Negative  Negative for abdominal pain, constipation, diarrhea, nausea and vomiting  Endocrine: Positive for polydipsia and polyuria  Genitourinary: Negative  Negative for difficulty urinating, dysuria and hematuria  Musculoskeletal: Positive for arthralgias (bilateral knees- right worse then left )  Negative for myalgias  Skin: Negative  Negative for rash and wound  Allergic/Immunologic: Negative  Negative for environmental allergies  Neurological: Negative  Negative for dizziness, seizures, syncope, light-headedness and headaches  Hematological: Negative  Does not bruise/bleed easily  Psychiatric/Behavioral: Positive for decreased concentration, dysphoric mood and sleep disturbance  Negative for self-injury and suicidal ideas  The patient is nervous/anxious  All other systems reviewed and are negative  Objective:      /86 (BP Location: Left arm, Patient Position: Sitting, Cuff Size: Large)   Pulse 102   Temp 98 3 °F (36 8 °C) (Tympanic)   Resp 18   Ht 6' 2" (1 88 m)   Wt (!) 173 kg (381 lb 3 2 oz)   SpO2 96%   BMI 48 94 kg/m²          Physical Exam  Vitals and nursing note reviewed  Constitutional:       General: He is not in acute distress  Appearance: Normal appearance  He is obese  He is not ill-appearing, toxic-appearing or diaphoretic  HENT:      Head: Normocephalic and atraumatic  Right Ear: Tympanic membrane, ear canal and external ear normal  There is no impacted cerumen  Left Ear: Tympanic membrane, ear canal and external ear normal  There is no impacted cerumen  Nose: Nose normal  No congestion or rhinorrhea  Mouth/Throat:      Mouth: Mucous membranes are moist       Pharynx: Oropharynx is clear   No oropharyngeal exudate or posterior oropharyngeal erythema  Eyes:      Extraocular Movements: Extraocular movements intact  Conjunctiva/sclera: Conjunctivae normal    Cardiovascular:      Rate and Rhythm: Normal rate and regular rhythm  Pulses: Normal pulses  Heart sounds: Normal heart sounds  No murmur heard  Pulmonary:      Effort: Pulmonary effort is normal  No respiratory distress  Breath sounds: Normal breath sounds  No wheezing  Abdominal:      General: Bowel sounds are normal  There is no distension  Palpations: Abdomen is soft  Tenderness: There is no abdominal tenderness  Musculoskeletal:         General: Normal range of motion  Cervical back: Normal range of motion and neck supple  Right lower leg: No edema  Left lower leg: No edema  Lymphadenopathy:      Cervical: No cervical adenopathy  Skin:     General: Skin is warm and dry  Capillary Refill: Capillary refill takes less than 2 seconds  Findings: No lesion or rash  Neurological:      General: No focal deficit present  Mental Status: He is alert and oriented to person, place, and time  Psychiatric:         Mood and Affect: Mood normal          Behavior: Behavior normal          Thought Content:  Thought content normal          Judgment: Judgment normal

## 2022-08-31 ENCOUNTER — TELEPHONE (OUTPATIENT)
Dept: FAMILY MEDICINE CLINIC | Facility: CLINIC | Age: 32
End: 2022-08-31

## 2022-10-12 LAB
ALBUMIN SERPL-MCNC: 4.1 G/DL (ref 4.1–5.2)
ALBUMIN/GLOB SERPL: 1.3 {RATIO} (ref 1.2–2.2)
ALP SERPL-CCNC: 71 IU/L (ref 44–121)
ALT SERPL-CCNC: 24 IU/L (ref 0–44)
AST SERPL-CCNC: 17 IU/L (ref 0–40)
BILIRUB SERPL-MCNC: 0.3 MG/DL (ref 0–1.2)
BUN SERPL-MCNC: 15 MG/DL (ref 6–20)
BUN/CREAT SERPL: 14 (ref 9–20)
CALCIUM SERPL-MCNC: 9.4 MG/DL (ref 8.7–10.2)
CHLORIDE SERPL-SCNC: 101 MMOL/L (ref 96–106)
CO2 SERPL-SCNC: 23 MMOL/L (ref 20–29)
CREAT SERPL-MCNC: 1.09 MG/DL (ref 0.76–1.27)
EGFR: 94 ML/MIN/1.73
GLOBULIN SER-MCNC: 3.1 G/DL (ref 1.5–4.5)
GLUCOSE SERPL-MCNC: 86 MG/DL (ref 70–99)
HBA1C MFR BLD: 7.2 % (ref 4.8–5.6)
POTASSIUM SERPL-SCNC: 4.3 MMOL/L (ref 3.5–5.2)
PROT SERPL-MCNC: 7.2 G/DL (ref 6–8.5)
SODIUM SERPL-SCNC: 139 MMOL/L (ref 134–144)

## 2022-11-28 DIAGNOSIS — E11.65 TYPE 2 DIABETES MELLITUS WITH HYPERGLYCEMIA, WITHOUT LONG-TERM CURRENT USE OF INSULIN (HCC): ICD-10-CM

## 2022-11-28 RX ORDER — DULAGLUTIDE 3 MG/.5ML
3 INJECTION, SOLUTION SUBCUTANEOUS WEEKLY
Qty: 2 ML | Refills: 5 | Status: SHIPPED | OUTPATIENT
Start: 2022-11-28 | End: 2022-12-05

## 2022-12-04 DIAGNOSIS — E11.65 TYPE 2 DIABETES MELLITUS WITH HYPERGLYCEMIA, WITHOUT LONG-TERM CURRENT USE OF INSULIN (HCC): ICD-10-CM

## 2022-12-05 RX ORDER — DULAGLUTIDE 3 MG/.5ML
3 INJECTION, SOLUTION SUBCUTANEOUS WEEKLY
Qty: 2 ML | Refills: 5 | Status: SHIPPED | OUTPATIENT
Start: 2022-12-05

## 2023-03-06 ENCOUNTER — TELEPHONE (OUTPATIENT)
Dept: FAMILY MEDICINE CLINIC | Facility: CLINIC | Age: 33
End: 2023-03-06

## 2023-05-15 ENCOUNTER — TELEPHONE (OUTPATIENT)
Dept: ENDOCRINOLOGY | Facility: HOSPITAL | Age: 33
End: 2023-05-15

## 2023-05-15 NOTE — TELEPHONE ENCOUNTER
The patient called this afternoon and stated that he is having issues still getting his Trulicity from the pharmacy  As a result he was wondering if he could just switch to  insulin instead

## 2023-05-16 ENCOUNTER — TELEPHONE (OUTPATIENT)
Dept: ENDOCRINOLOGY | Facility: HOSPITAL | Age: 33
End: 2023-05-16

## 2023-05-16 NOTE — TELEPHONE ENCOUNTER
Has not been seen since August 2022  No showed his last appointment  He needs to schedule an appointment to discuss any changes to his medications

## 2023-05-17 ENCOUNTER — TELEPHONE (OUTPATIENT)
Dept: ENDOCRINOLOGY | Facility: HOSPITAL | Age: 33
End: 2023-05-17

## 2023-05-19 DIAGNOSIS — E11.65 TYPE 2 DIABETES MELLITUS WITH HYPERGLYCEMIA, WITHOUT LONG-TERM CURRENT USE OF INSULIN (HCC): Primary | ICD-10-CM

## 2023-05-19 DIAGNOSIS — E78.2 MIXED HYPERLIPIDEMIA: ICD-10-CM

## 2023-05-19 NOTE — TELEPHONE ENCOUNTER
Needs a hemoglobin A1c, comprehensive metabolic panel, CBC, lipid panel and urine microalbumin please

## 2023-05-23 LAB
ALBUMIN SERPL-MCNC: 4.4 G/DL (ref 4–5)
ALBUMIN/CREAT UR: 29 MG/G CREAT (ref 0–29)
ALBUMIN/GLOB SERPL: 1.6 {RATIO} (ref 1.2–2.2)
ALP SERPL-CCNC: 83 IU/L (ref 44–121)
ALT SERPL-CCNC: 28 IU/L (ref 0–44)
AST SERPL-CCNC: 20 IU/L (ref 0–40)
BASOPHILS # BLD AUTO: 0.1 X10E3/UL (ref 0–0.2)
BASOPHILS NFR BLD AUTO: 1 %
BILIRUB SERPL-MCNC: 0.4 MG/DL (ref 0–1.2)
BUN SERPL-MCNC: 18 MG/DL (ref 6–20)
BUN/CREAT SERPL: 17 (ref 9–20)
CALCIUM SERPL-MCNC: 9.9 MG/DL (ref 8.7–10.2)
CHLORIDE SERPL-SCNC: 99 MMOL/L (ref 96–106)
CHOLEST SERPL-MCNC: 212 MG/DL (ref 100–199)
CO2 SERPL-SCNC: 23 MMOL/L (ref 20–29)
CREAT SERPL-MCNC: 1.08 MG/DL (ref 0.76–1.27)
CREAT UR-MCNC: 155 MG/DL
EGFR: 94 ML/MIN/1.73
EOSINOPHIL # BLD AUTO: 0.2 X10E3/UL (ref 0–0.4)
EOSINOPHIL NFR BLD AUTO: 3 %
ERYTHROCYTE [DISTWIDTH] IN BLOOD BY AUTOMATED COUNT: 13.5 % (ref 11.6–15.4)
EST. AVERAGE GLUCOSE BLD GHB EST-MCNC: 226 MG/DL
GLOBULIN SER-MCNC: 2.8 G/DL (ref 1.5–4.5)
GLUCOSE SERPL-MCNC: 152 MG/DL (ref 70–99)
HBA1C MFR BLD: 9.5 % (ref 4.8–5.6)
HCT VFR BLD AUTO: 49.2 % (ref 37.5–51)
HDLC SERPL-MCNC: 25 MG/DL
HGB BLD-MCNC: 16.6 G/DL (ref 13–17.7)
IMM GRANULOCYTES # BLD: 0.1 X10E3/UL (ref 0–0.1)
IMM GRANULOCYTES NFR BLD: 1 %
LDLC SERPL CALC-MCNC: 133 MG/DL (ref 0–99)
LDLC/HDLC SERPL: 5.3 RATIO (ref 0–3.6)
LYMPHOCYTES # BLD AUTO: 2.4 X10E3/UL (ref 0.7–3.1)
LYMPHOCYTES NFR BLD AUTO: 31 %
MCH RBC QN AUTO: 27.9 PG (ref 26.6–33)
MCHC RBC AUTO-ENTMCNC: 33.7 G/DL (ref 31.5–35.7)
MCV RBC AUTO: 83 FL (ref 79–97)
MICROALBUMIN UR-MCNC: 45.4 UG/ML
MONOCYTES # BLD AUTO: 0.6 X10E3/UL (ref 0.1–0.9)
MONOCYTES NFR BLD AUTO: 7 %
NEUTROPHILS # BLD AUTO: 4.6 X10E3/UL (ref 1.4–7)
NEUTROPHILS NFR BLD AUTO: 57 %
PLATELET # BLD AUTO: 233 X10E3/UL (ref 150–450)
POTASSIUM SERPL-SCNC: 4.4 MMOL/L (ref 3.5–5.2)
PROT SERPL-MCNC: 7.2 G/DL (ref 6–8.5)
RBC # BLD AUTO: 5.95 X10E6/UL (ref 4.14–5.8)
SL AMB VLDL CHOLESTEROL CALC: 54 MG/DL (ref 5–40)
SODIUM SERPL-SCNC: 138 MMOL/L (ref 134–144)
TRIGL SERPL-MCNC: 296 MG/DL (ref 0–149)
WBC # BLD AUTO: 8 X10E3/UL (ref 3.4–10.8)

## 2023-05-24 ENCOUNTER — OFFICE VISIT (OUTPATIENT)
Dept: ENDOCRINOLOGY | Facility: HOSPITAL | Age: 33
End: 2023-05-24
Payer: COMMERCIAL

## 2023-05-24 VITALS
BODY MASS INDEX: 40.43 KG/M2 | WEIGHT: 315 LBS | HEIGHT: 74 IN | HEART RATE: 104 BPM | SYSTOLIC BLOOD PRESSURE: 120 MMHG | DIASTOLIC BLOOD PRESSURE: 90 MMHG

## 2023-05-24 DIAGNOSIS — E11.65 TYPE 2 DIABETES MELLITUS WITH HYPERGLYCEMIA, WITHOUT LONG-TERM CURRENT USE OF INSULIN (HCC): Primary | ICD-10-CM

## 2023-05-24 DIAGNOSIS — I10 ESSENTIAL HYPERTENSION: ICD-10-CM

## 2023-05-24 DIAGNOSIS — E78.2 MIXED HYPERLIPIDEMIA: ICD-10-CM

## 2023-05-24 PROCEDURE — 99214 OFFICE O/P EST MOD 30 MIN: CPT | Performed by: NURSE PRACTITIONER

## 2023-05-24 RX ORDER — ATORVASTATIN CALCIUM 20 MG/1
20 TABLET, FILM COATED ORAL DAILY
Qty: 90 TABLET | Refills: 3 | Status: SHIPPED | OUTPATIENT
Start: 2023-05-24

## 2023-05-24 NOTE — PROGRESS NOTES
Miguelina Muhammad 34 y o  male MRN: [id number]    Encounter: [id number]      Assessment/Plan     Assessment: This is a 34y o -year-old male with type 2 diabetes with hypertension and hyperlipidemia  Plan:  1  Type 2 diabetes: His hemoglobin A1c has improved to 9 5 but remains above goal   He explains that he has had some GI discomfort which he believes is due in part to Trulicity  For this reason, he will discontinue the Trulicity  We discussed the benefits, risks and side effects of Jardiance  He will start Jardiance 25mg daily  I have asked him to contact the office with any side effects or concerns  He will check his blood sugars twice daily at alternating times and send a record to the office for review in 2 weeks  Will have him follow up for diabetes eye exam once his blood sugars are better controlled  Reviewed recognition and proper treatment of hypoglycemic episodes  Check hemoglobin A1c prior to next visit      2  Hypertension:  He is normotensive in the office today  Continue Valsartan  Check comprehensive metabolic panel prior to next visit      3  Hyperlipidemia: Continue atorvastatin  Check  Fasting lipid panel prior to next visit  CC: Type 2 Diabetes follow up    History of Present Illness     HPI:  34 o  year old male with type 2 diabetes for 4 month, hyperlipidemia, hypertension for follow-up  He was diagnosed after an episode of thirst and polyuria   He was started on metformin   He is on oral agents at home and takes metformin XR 1000 XR twice daily and Trulicity  His most recent hemoglobin A1c from May 24, 2023 is 9 5   He complains of some  mild to moderate GI distress at times with Trulicity  He denies any polyphagia, and blurry vision   He now has only occasional nocturia   He denies numbness or tingling of the feet   He denies chest pain or shortness of breath   He denies neuropathy, nephropathy, retinopathy, heart attack, stroke and claudication but does admit to none   He did lose 40 lb prior to the diagnosis of diabetes      Hypoglycemic episodes: None  H/o of hypoglycemia causing hospitalization or intervention such as glucagon injection  or ambulance call  No   Hypoglycemia symptoms: never had one  Treatment of hypoglycemia: would eat food  Glucagon:No   Medic alert tag: recommended,Yes       The patient's last eye exam was not yet   The patient's last foot exam was performed at last office visit on January 11, 2021      Blood Sugar/Glucometer/Pump/CGM review: Presents with no blood sugar records or meter for download in the office today      He has hypertension and takes valsartan 160 mg daily   He denies headache or stroke-like symptoms      He has hyperlipidemia and takes atorvastatin 20 mg daily   He denies chest pain or shortness of breath  Review of Systems   Constitutional: Negative  Negative for chills, fatigue and fever  HENT: Negative  Negative for trouble swallowing and voice change  Eyes: Negative for photophobia, pain, discharge, redness, itching and visual disturbance  Respiratory: Negative for cough and shortness of breath  Cardiovascular: Negative for chest pain and palpitations  Gastrointestinal: Negative for abdominal pain, constipation, diarrhea, nausea and vomiting  Endocrine: Negative for cold intolerance, heat intolerance, polydipsia, polyphagia and polyuria  Genitourinary: Negative  Musculoskeletal: Negative  Skin: Negative  Allergic/Immunologic: Negative  Neurological: Negative for dizziness, syncope, light-headedness and headaches  Hematological: Negative  Psychiatric/Behavioral: Negative  All other systems reviewed and are negative        Historical Information   Past Medical History:   Diagnosis Date   • GERD (gastroesophageal reflux disease)    • Herniated lumbar intervertebral disc      Past Surgical History:   Procedure Laterality Date   • ESOPHAGUS SURGERY      EGD with blood vessel clipping "    Social History   Social History     Substance and Sexual Activity   Alcohol Use Yes     Social History     Substance and Sexual Activity   Drug Use Not Currently     Social History     Tobacco Use   Smoking Status Every Day   • Packs/day: 1 50   • Types: Cigarettes   • Passive exposure: Never   Smokeless Tobacco Former   • Types: Chew     Family History:   Family History   Problem Relation Age of Onset   • Alcohol abuse Father    • Cirrhosis Father    • Hypertension Sister    • No Known Problems Son    • No Known Problems Son        Meds/Allergies   Current Outpatient Medications   Medication Sig Dispense Refill   • Blood Glucose Monitoring Suppl (ONE TOUCH ULTRA 2) w/Device KIT Use to test blood sugars twice a day 1 each 0   • OneTouch Delica Lancets 71G MISC Use daily 100 each 3   • OneTouch Ultra test strip USE 1 EACH DAILY AS INSTRUCTED 50 strip 7   • Trulicity 3 ME/6 2UM SOPN INJECT 0 5 ML (3 MG TOTAL) UNDER THE SKIN ONCE A WEEK 2 mL 5   • atorvastatin (LIPITOR) 20 mg tablet Take 1 tablet (20 mg total) by mouth daily (Patient not taking: Reported on 5/24/2023) 90 tablet 1   • escitalopram (Lexapro) 10 mg tablet Take 1 tablet (10 mg total) by mouth daily (Patient not taking: Reported on 5/24/2023) 30 tablet 3   • glimepiride (AMARYL) 2 mg tablet Take 1 tablet (2 mg total) by mouth 2 (two) times a day before meals (Patient not taking: Reported on 5/24/2023) 180 tablet 1   • methocarbamol (ROBAXIN) 750 mg tablet Take 1 tablet (750 mg total) by mouth daily at bedtime as needed for muscle spasms (Patient not taking: Reported on 5/24/2023) 30 tablet 0     No current facility-administered medications for this visit  Allergies   Allergen Reactions   • Metformin GI Intolerance       Objective   Vitals: Blood pressure 120/90, pulse 104, height 6' 2\" (1 88 m), weight (!) 177 kg (390 lb 12 8 oz)  Physical Exam  Vitals reviewed  Constitutional:       Appearance: He is well-developed  He is obese     HENT:      " Head: Normocephalic and atraumatic  Nose: Nose normal    Eyes:      Conjunctiva/sclera: Conjunctivae normal       Pupils: Pupils are equal, round, and reactive to light  Cardiovascular:      Rate and Rhythm: Normal rate and regular rhythm  Heart sounds: Normal heart sounds  Pulmonary:      Effort: Pulmonary effort is normal       Breath sounds: Normal breath sounds  Abdominal:      General: Bowel sounds are normal       Palpations: Abdomen is soft  Musculoskeletal:         General: Normal range of motion  Cervical back: Normal range of motion and neck supple  Skin:     General: Skin is warm and dry  Neurological:      Mental Status: He is alert and oriented to person, place, and time  Psychiatric:         Behavior: Behavior normal          Thought Content:  Thought content normal          Judgment: Judgment normal        Lab Results:   Lab Results   Component Value Date/Time    Albumin 4 4 05/22/2023 12:58 PM    Albumin 4 1 10/11/2022 01:51 PM    Albumin 4 6 07/29/2022 07:31 AM    ALT 28 05/22/2023 12:58 PM    ALT 24 10/11/2022 01:51 PM    ALT 21 07/29/2022 07:31 AM    AST 20 05/22/2023 12:58 PM    AST 17 10/11/2022 01:51 PM    AST 16 07/29/2022 07:31 AM    BUN 18 05/22/2023 12:58 PM    BUN 15 10/11/2022 01:51 PM    BUN 22 (H) 07/29/2022 07:31 AM    Chloride 99 05/22/2023 12:58 PM    Chloride 101 10/11/2022 01:51 PM    Chloride 100 07/29/2022 07:31 AM    CO2 23 05/22/2023 12:58 PM    CO2 23 10/11/2022 01:51 PM    CO2 26 07/29/2022 07:31 AM    Creatinine 1 08 05/22/2023 12:58 PM    Creatinine 1 09 10/11/2022 01:51 PM    Creatinine 1 11 07/29/2022 07:31 AM    Globulin, Total 2 8 05/22/2023 12:58 PM    Globulin, Total 3 1 10/11/2022 01:51 PM    Globulin, Total 2 6 07/29/2022 07:31 AM    HCT 49 2 05/22/2023 12:58 PM    HDL 25 (L) 05/22/2023 12:58 PM    HDL 23 (L) 07/29/2022 07:31 AM    Hemoglobin 16 6 05/22/2023 12:58 PM    Hemoglobin A1C 9 5 (H) 05/22/2023 12:58 PM    Hemoglobin A1C 7 2 (H) "10/11/2022 01:51 PM    Hemoglobin A1C 9 2 (H) 07/29/2022 07:31 AM    Potassium 4 4 05/22/2023 12:58 PM    Potassium 4 3 10/11/2022 01:51 PM    Potassium 4 5 07/29/2022 07:31 AM    MCV 83 05/22/2023 12:58 PM    Platelet Count 217 78/60/0381 12:58 PM    Protein, Total 7 2 05/22/2023 12:58 PM    Protein, Total 7 2 10/11/2022 01:51 PM    Protein, Total 7 2 07/29/2022 07:31 AM    Triglycerides 296 (H) 05/22/2023 12:58 PM    Triglycerides 468 (H) 07/29/2022 07:31 AM    White Blood Cell Count 8 0 05/22/2023 12:58 PM     Portions of the record may have been created with voice recognition software  Occasional wrong word or \"sound a like\" substitutions may have occurred due to the inherent limitations of voice recognition software  Read the chart carefully and recognize, using context, where substitutions have occurred    "

## 2023-05-24 NOTE — PATIENT INSTRUCTIONS
Be mindful of diet  Stay active and stay hydrate  Goal for your hemoglobin A1c is less than 7  Discontinue Trulicity due to side effects  As discussed, start Jardiance 25 mg daily  Contact the office with any side effects or concerns  Check your blood sugars twice daily at alternating times and send a record to the office in 2 weeks after starting Jardiance for review  Continue valsartan and atorvastatin  Obtain lab work as prescribed

## 2023-06-05 ENCOUNTER — TELEPHONE (OUTPATIENT)
Dept: ENDOCRINOLOGY | Facility: HOSPITAL | Age: 33
End: 2023-06-05

## 2023-06-05 NOTE — TELEPHONE ENCOUNTER
Patient tried to  his Jardiance and it was too expensive  He is going to reach out to insurance to see if they cover anything else at a cheaper out of pocket cost and let us know  In the mean time is there anything else you want to try or give samples?

## 2023-06-07 NOTE — TELEPHONE ENCOUNTER
Patient has not checked with insurance yet  I don't see in your note what mg you gave him  He said he's pretty sure it's the 25 mg so I set samples aside and advised him to check with insurance as soon as possible since we don't always have samples

## 2023-09-14 LAB
ALBUMIN SERPL-MCNC: 4.4 G/DL (ref 4.1–5.1)
ALBUMIN/GLOB SERPL: 1.6 {RATIO} (ref 1.2–2.2)
ALP SERPL-CCNC: 85 IU/L (ref 44–121)
ALT SERPL-CCNC: 27 IU/L (ref 0–44)
AST SERPL-CCNC: 16 IU/L (ref 0–40)
BASOPHILS # BLD AUTO: 0 X10E3/UL (ref 0–0.2)
BASOPHILS NFR BLD AUTO: 1 %
BILIRUB SERPL-MCNC: 0.4 MG/DL (ref 0–1.2)
BUN SERPL-MCNC: 17 MG/DL (ref 6–20)
BUN/CREAT SERPL: 16 (ref 9–20)
CALCIUM SERPL-MCNC: 9.7 MG/DL (ref 8.7–10.2)
CHLORIDE SERPL-SCNC: 102 MMOL/L (ref 96–106)
CO2 SERPL-SCNC: 24 MMOL/L (ref 20–29)
CREAT SERPL-MCNC: 1.05 MG/DL (ref 0.76–1.27)
EGFR: 97 ML/MIN/1.73
EOSINOPHIL # BLD AUTO: 0.2 X10E3/UL (ref 0–0.4)
EOSINOPHIL NFR BLD AUTO: 3 %
ERYTHROCYTE [DISTWIDTH] IN BLOOD BY AUTOMATED COUNT: 13.4 % (ref 11.6–15.4)
EST. AVERAGE GLUCOSE BLD GHB EST-MCNC: 220 MG/DL
GLOBULIN SER-MCNC: 2.8 G/DL (ref 1.5–4.5)
GLUCOSE SERPL-MCNC: 125 MG/DL (ref 70–99)
HBA1C MFR BLD: 9.3 % (ref 4.8–5.6)
HCT VFR BLD AUTO: 50 % (ref 37.5–51)
HGB BLD-MCNC: 16.8 G/DL (ref 13–17.7)
IMM GRANULOCYTES # BLD: 0 X10E3/UL (ref 0–0.1)
IMM GRANULOCYTES NFR BLD: 1 %
LYMPHOCYTES # BLD AUTO: 1.9 X10E3/UL (ref 0.7–3.1)
LYMPHOCYTES NFR BLD AUTO: 31 %
MCH RBC QN AUTO: 27.7 PG (ref 26.6–33)
MCHC RBC AUTO-ENTMCNC: 33.6 G/DL (ref 31.5–35.7)
MCV RBC AUTO: 82 FL (ref 79–97)
MONOCYTES # BLD AUTO: 0.5 X10E3/UL (ref 0.1–0.9)
MONOCYTES NFR BLD AUTO: 8 %
NEUTROPHILS # BLD AUTO: 3.6 X10E3/UL (ref 1.4–7)
NEUTROPHILS NFR BLD AUTO: 56 %
PLATELET # BLD AUTO: 177 X10E3/UL (ref 150–450)
POTASSIUM SERPL-SCNC: 4.4 MMOL/L (ref 3.5–5.2)
PROT SERPL-MCNC: 7.2 G/DL (ref 6–8.5)
RBC # BLD AUTO: 6.07 X10E6/UL (ref 4.14–5.8)
SODIUM SERPL-SCNC: 141 MMOL/L (ref 134–144)
WBC # BLD AUTO: 6.2 X10E3/UL (ref 3.4–10.8)

## 2023-10-12 ENCOUNTER — OFFICE VISIT (OUTPATIENT)
Dept: ENDOCRINOLOGY | Facility: HOSPITAL | Age: 33
End: 2023-10-12
Payer: COMMERCIAL

## 2023-10-12 VITALS — HEIGHT: 74 IN | WEIGHT: 315 LBS | BODY MASS INDEX: 40.43 KG/M2

## 2023-10-12 DIAGNOSIS — I10 ESSENTIAL HYPERTENSION: ICD-10-CM

## 2023-10-12 DIAGNOSIS — E11.65 TYPE 2 DIABETES MELLITUS WITH HYPERGLYCEMIA, WITHOUT LONG-TERM CURRENT USE OF INSULIN (HCC): Primary | ICD-10-CM

## 2023-10-12 DIAGNOSIS — E11.9 TYPE 2 DIABETES MELLITUS WITHOUT COMPLICATION, WITHOUT LONG-TERM CURRENT USE OF INSULIN (HCC): ICD-10-CM

## 2023-10-12 DIAGNOSIS — E78.2 MIXED HYPERLIPIDEMIA: ICD-10-CM

## 2023-10-12 PROCEDURE — 99214 OFFICE O/P EST MOD 30 MIN: CPT | Performed by: NURSE PRACTITIONER

## 2023-10-12 RX ORDER — BLOOD SUGAR DIAGNOSTIC
STRIP MISCELLANEOUS
Qty: 100 STRIP | Refills: 7 | Status: SHIPPED | OUTPATIENT
Start: 2023-10-12 | End: 2023-10-17

## 2023-10-12 RX ORDER — ATORVASTATIN CALCIUM 20 MG/1
20 TABLET, FILM COATED ORAL DAILY
Qty: 90 TABLET | Refills: 3 | Status: SHIPPED | OUTPATIENT
Start: 2023-10-12

## 2023-10-12 RX ORDER — LANCETS 30 GAUGE
EACH MISCELLANEOUS
Qty: 1 KIT | Refills: 0 | Status: SHIPPED | OUTPATIENT
Start: 2023-10-12 | End: 2023-10-17

## 2023-10-12 RX ORDER — GLIMEPIRIDE 2 MG/1
2 TABLET ORAL
Qty: 180 TABLET | Refills: 1 | Status: SHIPPED | OUTPATIENT
Start: 2023-10-12

## 2023-10-12 NOTE — PATIENT INSTRUCTIONS
Be mindful of diet. Stay active and stay hydrate. Goal for your hemoglobin A1c is less than 7. As discussed, restart Jardiance 25 mg daily. Start glimepiride 2 mg with breakfast and dinner. Contact the office with any side effects or concerns. Check your blood sugars twice daily at alternating times and send a record to the office in 2 weeks after starting Jardiance and Glimepiride for review. Restart atorvastatin. Go see Tim Frantz. Obtain lab work as prescribed.

## 2023-10-12 NOTE — PROGRESS NOTES
Rose Hammond 34 y.o. male MRN: [id number]    Encounter: [id number]      Assessment/Plan     Assessment: This is a 34y.o.-year-old male with type 2 diabetes with hypertension and hyperlipidemia. Plan:  1. Type 2 diabetes: His hemoglobin A1c has improved to 9.3. He has been without his medication for approximate 2-3 weeks. We discussed the benefits, risks and side effects of Jardiance. He will restart Jardiance 25mg daily and start glimepiride 2 mg with breakfast and dinner. I have asked him to contact the office with any side effects or concerns. He will check his blood sugars twice daily at alternating times and send a record to the office for review in 2 weeks. Will have him follow up for diabetes eye exam once his blood sugars are better controlled. Reviewed recognition and proper treatment of hypoglycemic episodes. Check hemoglobin A1c prior to next visit. 2.    Hypertension:  Continue Valsartan. Check comprehensive metabolic panel prior to next visit. 3.   Hyperlipidemia: Restart atorvastatin. Check  Fasting lipid panel prior to next visit. CC: Type II diabetes follow up    History of Present Illness     HPI:  31y.o. year old male with type 2 diabetes for 8 months, hyperlipidemia, hypertension for follow-up. He was diagnosed after an episode of thirst and polyuria. He was started on metformin. He is on oral agents at home and takes Jardiance 25 mg daily. His most recent hemoglobin A1c from September 13, 2023 is 9.3. He discontinued Metformin and Trulicity after moderate GI distress at times. He denies any polyphagia, and blurry vision. He now has only occasional nocturia. He denies numbness or tingling of the feet. He denies chest pain or shortness of breath. He denies neuropathy, nephropathy, retinopathy, heart attack, stroke and claudication but does admit to none. He did lose 40 lb prior to the diagnosis of diabetes. Hypoglycemic episodes: None.   H/o of hypoglycemia causing hospitalization or intervention such as glucagon injection  or ambulance call  No.  Hypoglycemia symptoms: never had one. Treatment of hypoglycemia: would eat food. Glucagon:No.  Medic alert tag: recommended,Yes. The patient's last eye exam was not yet. The patient's last foot exam was performed at last office visit on January 11, 2021. Blood Sugar/Glucometer/Pump/CGM review: Presents with no blood sugar records or meter for download in the office today. He has hypertension and takes valsartan 160 mg daily. He denies headache or stroke-like symptoms. He has hyperlipidemia and takes atorvastatin 20 mg daily. He denies chest pain or shortness of breath. Review of Systems   Constitutional: Negative. Negative for chills, fatigue and fever. HENT: Negative. Negative for trouble swallowing and voice change. Eyes:  Negative for photophobia, pain, discharge, redness, itching and visual disturbance. Respiratory:  Negative for cough and shortness of breath. Cardiovascular:  Negative for chest pain and palpitations. Gastrointestinal:  Negative for abdominal pain, constipation, diarrhea, nausea and vomiting. Endocrine: Positive for polyuria (1-2 times per night). Negative for cold intolerance, heat intolerance, polydipsia and polyphagia. Genitourinary: Negative. Musculoskeletal: Negative. Skin: Negative. Allergic/Immunologic: Negative. Neurological:  Negative for dizziness, syncope, light-headedness and headaches. Hematological: Negative. Psychiatric/Behavioral: Negative. All other systems reviewed and are negative.       Historical Information   Past Medical History:   Diagnosis Date    GERD (gastroesophageal reflux disease)     Herniated lumbar intervertebral disc      Past Surgical History:   Procedure Laterality Date    ESOPHAGUS SURGERY      EGD with blood vessel clipping     Social History   Social History     Substance and Sexual Activity   Alcohol Use Yes     Social History     Substance and Sexual Activity   Drug Use Not Currently     Social History     Tobacco Use   Smoking Status Every Day    Packs/day: 1.50    Types: Cigarettes    Passive exposure: Never   Smokeless Tobacco Former    Types: Chew     Family History:   Family History   Problem Relation Age of Onset    Alcohol abuse Father     Cirrhosis Father     Hypertension Sister     No Known Problems Son     No Known Problems Son        Meds/Allergies   Current Outpatient Medications   Medication Sig Dispense Refill    atorvastatin (LIPITOR) 20 mg tablet Take 1 tablet (20 mg total) by mouth daily 90 tablet 3    Blood Glucose Monitoring Suppl (ONE TOUCH ULTRA 2) w/Device KIT Use to test blood sugars twice a day 1 each 0    Empagliflozin 25 MG TABS Take 1 tablet (25 mg total) by mouth every morning 30 tablet 6    escitalopram (Lexapro) 10 mg tablet Take 1 tablet (10 mg total) by mouth daily (Patient not taking: Reported on 5/24/2023) 30 tablet 3    glimepiride (AMARYL) 2 mg tablet Take 1 tablet (2 mg total) by mouth 2 (two) times a day before meals (Patient not taking: Reported on 5/24/2023) 180 tablet 1    methocarbamol (ROBAXIN) 750 mg tablet Take 1 tablet (750 mg total) by mouth daily at bedtime as needed for muscle spasms (Patient not taking: Reported on 5/24/2023) 30 tablet 0    OneTouch Delica Lancets 59W MISC Use daily 100 each 3    OneTouch Ultra test strip USE 1 EACH DAILY AS INSTRUCTED 50 strip 7    Trulicity 3 OV/5.7OM SOPN INJECT 0.5 ML (3 MG TOTAL) UNDER THE SKIN ONCE A WEEK 2 mL 5     No current facility-administered medications for this visit. Allergies   Allergen Reactions    Metformin GI Intolerance       Objective   Vitals: There were no vitals taken for this visit. Physical Exam  Vitals reviewed. Constitutional:       Appearance: He is well-developed. He is obese. HENT:      Head: Normocephalic and atraumatic.       Nose: Nose normal.   Eyes:      Conjunctiva/sclera: Conjunctivae normal.      Pupils: Pupils are equal, round, and reactive to light. Cardiovascular:      Rate and Rhythm: Normal rate and regular rhythm. Heart sounds: Normal heart sounds. Pulmonary:      Effort: Pulmonary effort is normal.      Breath sounds: Normal breath sounds. Abdominal:      General: Bowel sounds are normal.      Palpations: Abdomen is soft. Musculoskeletal:         General: Normal range of motion. Cervical back: Normal range of motion and neck supple. Skin:     General: Skin is warm and dry. Neurological:      Mental Status: He is alert and oriented to person, place, and time. Psychiatric:         Behavior: Behavior normal.         Thought Content:  Thought content normal.         Judgment: Judgment normal.       Lab Results:   Lab Results   Component Value Date/Time    Hemoglobin A1C 9.3 (H) 09/13/2023 02:19 PM    Hemoglobin A1C 9.5 (H) 05/22/2023 12:58 PM    White Blood Cell Count 6.2 09/13/2023 02:19 PM    White Blood Cell Count 8.0 05/22/2023 12:58 PM    Hemoglobin 16.8 09/13/2023 02:19 PM    Hemoglobin 16.6 05/22/2023 12:58 PM    HCT 50.0 09/13/2023 02:19 PM    HCT 49.2 05/22/2023 12:58 PM    MCV 82 09/13/2023 02:19 PM    MCV 83 05/22/2023 12:58 PM    Platelet Count 757 15/04/4901 02:19 PM    Platelet Count 862 09/17/3537 12:58 PM    BUN 17 09/13/2023 02:19 PM    BUN 18 05/22/2023 12:58 PM    Potassium 4.4 09/13/2023 02:19 PM    Potassium 4.4 05/22/2023 12:58 PM    Chloride 102 09/13/2023 02:19 PM    Chloride 99 05/22/2023 12:58 PM    CO2 24 09/13/2023 02:19 PM    CO2 23 05/22/2023 12:58 PM    Creatinine 1.05 09/13/2023 02:19 PM    Creatinine 1.08 05/22/2023 12:58 PM    AST 16 09/13/2023 02:19 PM    AST 20 05/22/2023 12:58 PM    ALT 27 09/13/2023 02:19 PM    ALT 28 05/22/2023 12:58 PM    Protein, Total 7.2 09/13/2023 02:19 PM    Protein, Total 7.2 05/22/2023 12:58 PM    Albumin 4.4 09/13/2023 02:19 PM    Albumin 4.4 05/22/2023 12:58 PM    Globulin, Total 2.8 09/13/2023 02:19 PM Globulin, Total 2.8 05/22/2023 12:58 PM    HDL 25 (L) 05/22/2023 12:58 PM    Triglycerides 296 (H) 05/22/2023 12:58 PM       Portions of the record may have been created with voice recognition software. Occasional wrong word or "sound a like" substitutions may have occurred due to the inherent limitations of voice recognition software. Read the chart carefully and recognize, using context, where substitutions have occurred.

## 2023-10-17 ENCOUNTER — TELEPHONE (OUTPATIENT)
Age: 33
End: 2023-10-17

## 2023-10-17 DIAGNOSIS — E11.9 TYPE 2 DIABETES MELLITUS WITHOUT COMPLICATION, WITHOUT LONG-TERM CURRENT USE OF INSULIN (HCC): ICD-10-CM

## 2023-10-17 DIAGNOSIS — E11.65 TYPE 2 DIABETES MELLITUS WITH HYPERGLYCEMIA, WITHOUT LONG-TERM CURRENT USE OF INSULIN (HCC): Primary | ICD-10-CM

## 2023-10-17 RX ORDER — LANCETS 33 GAUGE
EACH MISCELLANEOUS
Qty: 100 EACH | Refills: 7 | Status: SHIPPED | OUTPATIENT
Start: 2023-10-17

## 2023-10-17 RX ORDER — PERPHENAZINE 16 MG/1
TABLET, FILM COATED ORAL
Qty: 100 EACH | Refills: 7 | Status: SHIPPED | OUTPATIENT
Start: 2023-10-17

## 2023-10-17 RX ORDER — BLOOD-GLUCOSE METER
KIT MISCELLANEOUS
Qty: 1 KIT | Refills: 0 | Status: SHIPPED | OUTPATIENT
Start: 2023-10-17

## 2023-10-17 NOTE — TELEPHONE ENCOUNTER
Patient called and stated that the insurance does not cover OneTouch but they cover Contour and new prescription was sent over for the patient to his pharmacy

## 2023-10-17 NOTE — TELEPHONE ENCOUNTER
Patient called regarding the one touch reader that was prescribed. His insurance does not cover this brand but they will cover Contour. Please send new script to his pharmacy.

## 2023-11-25 ENCOUNTER — HOSPITAL ENCOUNTER (EMERGENCY)
Facility: HOSPITAL | Age: 33
Discharge: HOME | End: 2023-11-25
Attending: EMERGENCY MEDICINE
Payer: COMMERCIAL

## 2023-11-25 VITALS
BODY MASS INDEX: 40.43 KG/M2 | TEMPERATURE: 98.3 F | DIASTOLIC BLOOD PRESSURE: 83 MMHG | SYSTOLIC BLOOD PRESSURE: 157 MMHG | HEIGHT: 74 IN | RESPIRATION RATE: 18 BRPM | HEART RATE: 98 BPM | OXYGEN SATURATION: 96 % | WEIGHT: 315 LBS

## 2023-11-25 DIAGNOSIS — M54.10 RADICULOPATHY: ICD-10-CM

## 2023-11-25 DIAGNOSIS — M54.50 ACUTE LOW BACK PAIN: Primary | ICD-10-CM

## 2023-11-25 PROCEDURE — 99284 EMERGENCY DEPT VISIT MOD MDM: CPT | Performed by: EMERGENCY MEDICINE

## 2023-11-25 PROCEDURE — 96372 THER/PROPH/DIAG INJ SC/IM: CPT

## 2023-11-25 PROCEDURE — 99282 EMERGENCY DEPT VISIT SF MDM: CPT

## 2023-11-25 RX ORDER — METHOCARBAMOL 500 MG/1
500 TABLET, FILM COATED ORAL 2 TIMES DAILY
Qty: 20 TABLET | Refills: 0 | Status: SHIPPED | OUTPATIENT
Start: 2023-11-25

## 2023-11-25 RX ORDER — KETOROLAC TROMETHAMINE 30 MG/ML
15 INJECTION, SOLUTION INTRAMUSCULAR; INTRAVENOUS ONCE
Status: COMPLETED | OUTPATIENT
Start: 2023-11-25 | End: 2023-11-25

## 2023-11-25 RX ORDER — METHOCARBAMOL 500 MG/1
1000 TABLET, FILM COATED ORAL ONCE
Status: COMPLETED | OUTPATIENT
Start: 2023-11-25 | End: 2023-11-25

## 2023-11-25 RX ADMIN — KETOROLAC TROMETHAMINE 15 MG: 30 INJECTION, SOLUTION INTRAMUSCULAR at 21:45

## 2023-11-25 RX ADMIN — METHOCARBAMOL 1000 MG: 500 TABLET ORAL at 21:45

## 2023-11-26 NOTE — ED PROVIDER NOTES
History  Chief Complaint   Patient presents with    Back Pain     Low back pain "for years" with increased pain since Thursday night. Denies injury. Took 2 hydrocodone and 1 naproxen 4 hours pta. 63-year-old male with a history of chronic back pain presents for evaluation of acute exacerbation of right-sided low back pain that radiates down right lateral thigh. Patient reports he cut down a james tree manually 2 days ago which significantly exacerbated the pain. Patient denies any trauma, unexplained weight loss, numbness or tingling, bowel or bladder incontinence, weakness, fever, IVDA, steroid use or known history of cancer. Patient was advised to start physical therapy earlier this year but has not done so yet. Prior to Admission Medications   Prescriptions Last Dose Informant Patient Reported? Taking?    Blood Glucose Monitoring Suppl (Contour Next Gen Monitor) w/Device KIT   No No   Sig: Use to test blood sugars twice a day   Empagliflozin 25 MG TABS   No No   Sig: Take 1 tablet (25 mg total) by mouth every morning   Lancets Micro Thin 33G MISC   No No   Sig: Use to test blood sugars twice a day   Trulicity 3 QW/2.3DY SOPN  Self No No   Sig: INJECT 0.5 ML (3 MG TOTAL) UNDER THE SKIN ONCE A WEEK   Patient not taking: Reported on 10/12/2023   atorvastatin (LIPITOR) 20 mg tablet   No No   Sig: Take 1 tablet (20 mg total) by mouth daily   escitalopram (Lexapro) 10 mg tablet  Self No No   Sig: Take 1 tablet (10 mg total) by mouth daily   Patient not taking: Reported on 10/12/2023   glimepiride (AMARYL) 2 mg tablet   No No   Sig: Take 1 tablet (2 mg total) by mouth 2 (two) times a day before meals   glucose blood (Contour Next Test) test strip   No No   Sig: Use to test blood sugars twice a day   methocarbamol (ROBAXIN) 750 mg tablet  Self No No   Sig: Take 1 tablet (750 mg total) by mouth daily at bedtime as needed for muscle spasms   Patient not taking: Reported on 5/24/2023 Facility-Administered Medications: None       Past Medical History:   Diagnosis Date    Diabetes mellitus (HCC)     GERD (gastroesophageal reflux disease)     Herniated lumbar intervertebral disc        Past Surgical History:   Procedure Laterality Date    ESOPHAGUS SURGERY      EGD with blood vessel clipping       Family History   Problem Relation Age of Onset    Alcohol abuse Father     Cirrhosis Father     Hypertension Sister     No Known Problems Son     No Known Problems Son      I have reviewed and agree with the history as documented. E-Cigarette/Vaping    E-Cigarette Use Former User      E-Cigarette/Vaping Substances    Nicotine Yes     THC No     CBD No     Flavoring No     Other No     Unknown No      Social History     Tobacco Use    Smoking status: Former     Packs/day: 1.50     Types: Cigarettes     Passive exposure: Never    Smokeless tobacco: Former     Types: Chew   Vaping Use    Vaping Use: Former    Substances: Nicotine   Substance Use Topics    Alcohol use: Yes    Drug use: Not Currently       Review of Systems   Constitutional:  Negative for fever. Musculoskeletal:  Positive for back pain. Physical Exam  Physical Exam  Vitals and nursing note reviewed. Constitutional:       General: He is not in acute distress. Appearance: He is well-developed. HENT:      Head: Normocephalic and atraumatic. Right Ear: External ear normal.      Left Ear: External ear normal.      Nose: Nose normal.   Eyes:      General: No scleral icterus. Pulmonary:      Effort: Pulmonary effort is normal. No respiratory distress. Abdominal:      General: There is no distension. Palpations: Abdomen is soft. Musculoskeletal:         General: No deformity. Normal range of motion. Cervical back: Normal range of motion and neck supple. Comments: No evidence of saddle anesthesia. Skin:     General: Skin is warm. Findings: No rash.    Neurological:      General: No focal deficit present. Mental Status: He is alert. Gait: Gait normal.   Psychiatric:         Mood and Affect: Mood normal.         Vital Signs  ED Triage Vitals [11/25/23 2049]   Temperature Pulse Respirations Blood Pressure SpO2   98.3 °F (36.8 °C) 98 18 157/83 96 %      Temp Source Heart Rate Source Patient Position - Orthostatic VS BP Location FiO2 (%)   Oral Monitor Sitting Right arm --      Pain Score       5           Vitals:    11/25/23 2049   BP: 157/83   Pulse: 98   Patient Position - Orthostatic VS: Sitting         Visual Acuity      ED Medications  Medications   ketorolac (TORADOL) injection 15 mg (15 mg Intramuscular Given 11/25/23 2145)   methocarbamol (ROBAXIN) tablet 1,000 mg (1,000 mg Oral Given 11/25/23 2145)       Diagnostic Studies  Results Reviewed       None                   No orders to display              Procedures  Procedures         ED Course                               SBIRT 22yo+      Flowsheet Row Most Recent Value   Initial Alcohol Screen: US AUDIT-C     1. How often do you have a drink containing alcohol? 1 Filed at: 11/25/2023 2051   2. How many drinks containing alcohol do you have on a typical day you are drinking? 1 Filed at: 11/25/2023 2051   3a. Male UNDER 65: How often do you have five or more drinks on one occasion? 0 Filed at: 11/25/2023 2051   Audit-C Score 2 Filed at: 11/25/2023 2051   CIERRA: How many times in the past year have you. .. Used an illegal drug or used a prescription medication for non-medical reasons? Never Filed at: 11/25/2023 2051                      Medical Decision Making  62-year-old male presenting with symptoms of acute low back pain and sciatica. Symptom control. Comprehensive spine referral.  Return precautions discussed. Problems Addressed:  Acute low back pain: acute illness or injury    Risk  Prescription drug management.              Disposition  Final diagnoses:   Acute low back pain   Radiculopathy     Time reflects when diagnosis was documented in both MDM as applicable and the Disposition within this note       Time User Action Codes Description Comment    11/25/2023  9:37 PM Destiny Oats Add [M54.50] Acute low back pain     11/25/2023  9:37 PM Destiny Oats Add [Y50.89] Radiculopathy           ED Disposition       ED Disposition   Discharge    Condition   Stable    Date/Time   Sat Nov 25, 2023 2137    Comment   Lucina Gottlieb discharge to home/self care. Follow-up Information       Follow up With Specialties Details Why Contact Info Additional Pierre, [address] [facility], Nurse Practitioner   [address]  [address]  [facility] [phone]        [facility] Emergency Department Emergency Medicine  If symptoms worsen [address] [facility] [phone]  [address] [facility] Emergency Department, [address] [facility] [place], [place], [address]            Discharge Medication List as of 11/25/2023  9:38 PM        START taking these medications    Details   !! methocarbamol (ROBAXIN) 500 mg tablet Take 1 tablet (500 mg total) by mouth 2 (two) times a day, Starting Sat 11/25/2023, Normal       !! - Potential duplicate medications found. Please discuss with provider.         CONTINUE these medications which have NOT CHANGED    Details   atorvastatin (LIPITOR) 20 mg tablet Take 1 tablet (20 mg total) by mouth daily, Starting Thu 10/12/2023, Normal      Blood Glucose Monitoring Suppl (Contour Next Gen Monitor) w/Device KIT Use to test blood sugars twice a day, Normal      Empagliflozin 25 MG TABS Take 1 tablet (25 mg total) by mouth every morning, Starting Thu 10/12/2023, Normal      escitalopram (Lexapro) 10 mg tablet Take 1 tablet (10 mg total) by mouth daily, Starting Fri 8/5/2022, Normal      glimepiride (AMARYL) 2 mg tablet Take 1 tablet (2 mg total) by mouth 2 (two) times a day before meals, Starting Thu 10/12/2023, Normal      glucose blood (Contour Next Test) test strip Use to test blood sugars twice a day, Normal      Lancets Micro Thin 33G MISC Use to test blood sugars twice a day, Normal      !! methocarbamol (ROBAXIN) 750 mg tablet Take 1 tablet (750 mg total) by mouth daily at bedtime as needed for muscle spasms, Starting Tue 1/25/2022, Normal      Trulicity 3 XQ/2.5GZ SOPN INJECT 0.5 ML (3 MG TOTAL) UNDER THE SKIN ONCE A WEEK, Starting Mon 12/5/2022, Normal       !! - Potential duplicate medications found. Please discuss with provider.               PDMP Review       None            ED Provider  Electronically Signed by             Jabier Vanessa DO  11/26/23 0021

## 2023-11-26 NOTE — ED NOTES
Patient evaluated, assessed, treated and discharged by Provider. This RN solely medicated patient.       Roopa Farias RN  11/25/23 2151       Nano Bains  11/25/23 2151

## 2023-11-27 ENCOUNTER — HOSPITAL ENCOUNTER (EMERGENCY)
Facility: HOSPITAL | Age: 33
Discharge: HOME | End: 2023-11-27
Attending: EMERGENCY MEDICINE | Admitting: EMERGENCY MEDICINE
Payer: COMMERCIAL

## 2023-11-27 VITALS
TEMPERATURE: 98.1 F | WEIGHT: 315 LBS | SYSTOLIC BLOOD PRESSURE: 152 MMHG | DIASTOLIC BLOOD PRESSURE: 84 MMHG | BODY MASS INDEX: 47.51 KG/M2 | RESPIRATION RATE: 18 BRPM | HEART RATE: 90 BPM | OXYGEN SATURATION: 99 %

## 2023-11-27 DIAGNOSIS — M54.50 ACUTE EXACERBATION OF CHRONIC LOW BACK PAIN: Primary | ICD-10-CM

## 2023-11-27 DIAGNOSIS — G89.29 ACUTE EXACERBATION OF CHRONIC LOW BACK PAIN: Primary | ICD-10-CM

## 2023-11-27 LAB
ANION GAP SERPL CALCULATED.3IONS-SCNC: 8 MMOL/L
BASOPHILS # BLD AUTO: 0.04 THOUSANDS/ÂΜL (ref 0–0.1)
BASOPHILS NFR BLD AUTO: 0 % (ref 0–1)
BUN SERPL-MCNC: 20 MG/DL (ref 5–25)
CALCIUM SERPL-MCNC: 9.7 MG/DL (ref 8.4–10.2)
CHLORIDE SERPL-SCNC: 102 MMOL/L (ref 96–108)
CO2 SERPL-SCNC: 27 MMOL/L (ref 21–32)
CREAT SERPL-MCNC: 1.1 MG/DL (ref 0.6–1.3)
EOSINOPHIL # BLD AUTO: 0.16 THOUSAND/ÂΜL (ref 0–0.61)
EOSINOPHIL NFR BLD AUTO: 2 % (ref 0–6)
ERYTHROCYTE [DISTWIDTH] IN BLOOD BY AUTOMATED COUNT: 14 % (ref 11.6–15.1)
GFR SERPL CREATININE-BSD FRML MDRD: 88 ML/MIN/1.73SQ M
GLUCOSE SERPL-MCNC: 162 MG/DL (ref 65–140)
GLUCOSE SERPL-MCNC: 179 MG/DL (ref 65–140)
HCT VFR BLD AUTO: 51.9 % (ref 36.5–49.3)
HGB BLD-MCNC: 16.4 G/DL (ref 12–17)
IMM GRANULOCYTES # BLD AUTO: 0.05 THOUSAND/UL (ref 0–0.2)
IMM GRANULOCYTES NFR BLD AUTO: 1 % (ref 0–2)
LYMPHOCYTES # BLD AUTO: 1.5 THOUSANDS/ÂΜL (ref 0.6–4.47)
LYMPHOCYTES NFR BLD AUTO: 16 % (ref 14–44)
MCH RBC QN AUTO: 27.1 PG (ref 26.8–34.3)
MCHC RBC AUTO-ENTMCNC: 31.6 G/DL (ref 31.4–37.4)
MCV RBC AUTO: 86 FL (ref 82–98)
MONOCYTES # BLD AUTO: 0.59 THOUSAND/ÂΜL (ref 0.17–1.22)
MONOCYTES NFR BLD AUTO: 6 % (ref 4–12)
NEUTROPHILS # BLD AUTO: 6.88 THOUSANDS/ÂΜL (ref 1.85–7.62)
NEUTS SEG NFR BLD AUTO: 75 % (ref 43–75)
NRBC BLD AUTO-RTO: 0 /100 WBCS
PLATELET # BLD AUTO: 210 THOUSANDS/UL (ref 149–390)
PMV BLD AUTO: 10.9 FL (ref 8.9–12.7)
POTASSIUM SERPL-SCNC: 4.4 MMOL/L (ref 3.5–5.3)
RBC # BLD AUTO: 6.05 MILLION/UL (ref 3.88–5.62)
SODIUM SERPL-SCNC: 137 MMOL/L (ref 135–147)
WBC # BLD AUTO: 9.22 THOUSAND/UL (ref 4.31–10.16)

## 2023-11-27 PROCEDURE — 36415 COLL VENOUS BLD VENIPUNCTURE: CPT | Performed by: EMERGENCY MEDICINE

## 2023-11-27 PROCEDURE — 96375 TX/PRO/DX INJ NEW DRUG ADDON: CPT

## 2023-11-27 PROCEDURE — 99283 EMERGENCY DEPT VISIT LOW MDM: CPT

## 2023-11-27 PROCEDURE — 99285 EMERGENCY DEPT VISIT HI MDM: CPT | Performed by: EMERGENCY MEDICINE

## 2023-11-27 PROCEDURE — 82948 REAGENT STRIP/BLOOD GLUCOSE: CPT

## 2023-11-27 PROCEDURE — 80048 BASIC METABOLIC PNL TOTAL CA: CPT | Performed by: EMERGENCY MEDICINE

## 2023-11-27 PROCEDURE — 96374 THER/PROPH/DIAG INJ IV PUSH: CPT

## 2023-11-27 PROCEDURE — 85025 COMPLETE CBC W/AUTO DIFF WBC: CPT | Performed by: EMERGENCY MEDICINE

## 2023-11-27 RX ORDER — METHOCARBAMOL 500 MG/1
1000 TABLET, FILM COATED ORAL ONCE
Status: COMPLETED | OUTPATIENT
Start: 2023-11-27 | End: 2023-11-27

## 2023-11-27 RX ORDER — METHYLPREDNISOLONE SODIUM SUCCINATE 125 MG/2ML
125 INJECTION, POWDER, LYOPHILIZED, FOR SOLUTION INTRAMUSCULAR; INTRAVENOUS ONCE
Status: COMPLETED | OUTPATIENT
Start: 2023-11-27 | End: 2023-11-27

## 2023-11-27 RX ORDER — HYDROMORPHONE HCL/PF 1 MG/ML
1 SYRINGE (ML) INJECTION ONCE
Status: COMPLETED | OUTPATIENT
Start: 2023-11-27 | End: 2023-11-27

## 2023-11-27 RX ORDER — METHYLPREDNISOLONE 4 MG/1
TABLET ORAL
Qty: 21 TABLET | Refills: 0 | Status: SHIPPED | OUTPATIENT
Start: 2023-11-28 | End: 2023-12-04

## 2023-11-27 RX ORDER — ACETAMINOPHEN 325 MG/1
975 TABLET ORAL ONCE
Status: COMPLETED | OUTPATIENT
Start: 2023-11-27 | End: 2023-11-27

## 2023-11-27 RX ORDER — KETOROLAC TROMETHAMINE 30 MG/ML
15 INJECTION, SOLUTION INTRAMUSCULAR; INTRAVENOUS ONCE
Status: COMPLETED | OUTPATIENT
Start: 2023-11-27 | End: 2023-11-27

## 2023-11-27 RX ORDER — LIDOCAINE 50 MG/G
1 PATCH TOPICAL ONCE
Status: DISCONTINUED | OUTPATIENT
Start: 2023-11-27 | End: 2023-11-27 | Stop reason: HOSPADM

## 2023-11-27 RX ORDER — OXYCODONE HYDROCHLORIDE 5 MG/1
5-10 TABLET ORAL EVERY 4 HOURS PRN
Qty: 25 TABLET | Refills: 0 | Status: SHIPPED | OUTPATIENT
Start: 2023-11-27 | End: 2023-12-04 | Stop reason: SDUPTHER

## 2023-11-27 RX ADMIN — KETOROLAC TROMETHAMINE 15 MG: 30 INJECTION, SOLUTION INTRAMUSCULAR at 11:43

## 2023-11-27 RX ADMIN — HYDROMORPHONE HYDROCHLORIDE 1 MG: 1 INJECTION, SOLUTION INTRAMUSCULAR; INTRAVENOUS; SUBCUTANEOUS at 13:10

## 2023-11-27 RX ADMIN — LIDOCAINE 1 PATCH: 700 PATCH TOPICAL at 11:43

## 2023-11-27 RX ADMIN — ACETAMINOPHEN 975 MG: 325 TABLET, FILM COATED ORAL at 11:43

## 2023-11-27 RX ADMIN — METHOCARBAMOL TABLETS 1000 MG: 500 TABLET, COATED ORAL at 11:43

## 2023-11-27 RX ADMIN — METHYLPREDNISOLONE SODIUM SUCCINATE 125 MG: 125 INJECTION, POWDER, FOR SOLUTION INTRAMUSCULAR; INTRAVENOUS at 12:11

## 2023-11-27 NOTE — ED PROVIDER NOTES
History  Chief Complaint   Patient presents with    Back Pain     Pt to ED c/o back pain, was seen a few days ago for same thing but has not improved. Pt states he is having pain in legs and having trouble standing. Denies problems with bowel/bladder. 80-year-old male with a history of chronic back pain presents for the evaluation of exacerbating right-sided radicular back pain. The patient describes bilateral spinal lumbar back pain. The patient was taking the previously prescribed medications without improvement. The patient states that he is now having difficulty standing or ambulating at all without severe pain or weakness. The patient denies any bowel or bladder dysfunction although he admits to being constipated. The patient states that he cannot lie flat in bed. He has been sleeping in a chrystal chair for the past couple of evenings. The patient states that he cannot get up without assistance. Patient denies any saddle anesthesia. Patient states that he has had a history of injections in his back in the past with questionable improvement. Back Pain      Prior to Admission Medications   Prescriptions Last Dose Informant Patient Reported? Taking?    Blood Glucose Monitoring Suppl (Contour Next Gen Monitor) w/Device KIT   No No   Sig: Use to test blood sugars twice a day   Empagliflozin 25 MG TABS   No No   Sig: Take 1 tablet (25 mg total) by mouth every morning   Lancets Micro Thin 33G MISC   No No   Sig: Use to test blood sugars twice a day   Trulicity 3 SW/9.0HI SOPN  Self No No   Sig: INJECT 0.5 ML (3 MG TOTAL) UNDER THE SKIN ONCE A WEEK   Patient not taking: Reported on 10/12/2023   atorvastatin (LIPITOR) 20 mg tablet   No No   Sig: Take 1 tablet (20 mg total) by mouth daily   escitalopram (Lexapro) 10 mg tablet  Self No No   Sig: Take 1 tablet (10 mg total) by mouth daily   Patient not taking: Reported on 10/12/2023   glimepiride (AMARYL) 2 mg tablet   No No   Sig: Take 1 tablet (2 mg total) by mouth 2 (two) times a day before meals   glucose blood (Contour Next Test) test strip   No No   Sig: Use to test blood sugars twice a day   methocarbamol (ROBAXIN) 500 mg tablet   No No   Sig: Take 1 tablet (500 mg total) by mouth 2 (two) times a day   methocarbamol (ROBAXIN) 750 mg tablet  Self No No   Sig: Take 1 tablet (750 mg total) by mouth daily at bedtime as needed for muscle spasms   Patient not taking: Reported on 5/24/2023      Facility-Administered Medications: None       Past Medical History:   Diagnosis Date    Diabetes mellitus (HCC)     GERD (gastroesophageal reflux disease)     Herniated lumbar intervertebral disc        Past Surgical History:   Procedure Laterality Date    ESOPHAGUS SURGERY      EGD with blood vessel clipping       Family History   Problem Relation Age of Onset    Alcohol abuse Father     Cirrhosis Father     Hypertension Sister     No Known Problems Son     No Known Problems Son      I have reviewed and agree with the history as documented. E-Cigarette/Vaping    E-Cigarette Use Former User      E-Cigarette/Vaping Substances    Nicotine Yes     THC No     CBD No     Flavoring No     Other No     Unknown No      Social History     Tobacco Use    Smoking status: Former     Packs/day: 1.50     Types: Cigarettes     Passive exposure: Never    Smokeless tobacco: Former     Types: Chew   Vaping Use    Vaping Use: Former    Substances: Nicotine   Substance Use Topics    Alcohol use: Yes    Drug use: Not Currently       Review of Systems   Musculoskeletal:  Positive for back pain. Physical Exam  Physical Exam  Constitutional:       Appearance: He is obese. Musculoskeletal:      Lumbar back: Spasms and tenderness present. No signs of trauma or bony tenderness. Decreased range of motion.          Vital Signs  ED Triage Vitals [11/27/23 1041]   Temperature Pulse Respirations Blood Pressure SpO2   98.1 °F (36.7 °C) 90 18 152/84 99 %      Temp src Heart Rate Source Patient Position - Orthostatic VS BP Location FiO2 (%)   -- Monitor Sitting Left arm --      Pain Score       10 - Worst Possible Pain           Vitals:    11/27/23 1041   BP: 152/84   Pulse: 90   Patient Position - Orthostatic VS: Sitting         Visual Acuity  Visual Acuity      Flowsheet Row Most Recent Value   L Pupil Size (mm) 3   R Pupil Size (mm) 3            ED Medications  Medications   lidocaine (LIDODERM) 5 % patch 1 patch (1 patch Topical Medication Applied 11/27/23 1143)   ketorolac (TORADOL) injection 15 mg (15 mg Intravenous Given 11/27/23 1143)   methocarbamol (ROBAXIN) tablet 1,000 mg (1,000 mg Oral Given 11/27/23 1143)   acetaminophen (TYLENOL) tablet 975 mg (975 mg Oral Given 11/27/23 1143)   methylPREDNISolone sodium succinate (Solu-MEDROL) injection 125 mg (125 mg Intravenous Given 11/27/23 1211)   HYDROmorphone (DILAUDID) injection 1 mg (1 mg Intravenous Given 11/27/23 1310)       Diagnostic Studies  Results Reviewed       Procedure Component Value Units Date/Time    Basic metabolic panel [[id number]]  (Abnormal) Collected: 11/27/23 1141    Lab Status: Final result Specimen: Blood from Arm, Left Updated: 11/27/23 1210     Sodium 137 mmol/L      Potassium 4.4 mmol/L      Chloride 102 mmol/L      CO2 27 mmol/L      ANION GAP 8 mmol/L      BUN 20 mg/dL      Creatinine 1.10 mg/dL      Glucose 179 mg/dL      Calcium 9.7 mg/dL      eGFR 88 ml/min/1.73sq m     Narrative:      [place] guidelines for Chronic Kidney Disease (CKD):     Stage 1 with normal or high GFR (GFR > 90 mL/min/1.73 square meters)    Stage 2 Mild CKD (GFR = 60-89 mL/min/1.73 square meters)    Stage 3A Moderate CKD (GFR = 45-59 mL/min/1.73 square meters)    Stage 3B Moderate CKD (GFR = 30-44 mL/min/1.73 square meters)    Stage 4 Severe CKD (GFR = 15-29 mL/min/1.73 square meters)    Stage 5 End Stage CKD (GFR <15 mL/min/1.73 square meters)  Note: GFR calculation is accurate only with a steady state creatinine    CBC and differential [[id number]]  (Abnormal) Collected: 11/27/23 1141    Lab Status: Final result Specimen: Blood from Arm, Left Updated: 11/27/23 1143     WBC 9.22 Thousand/uL      RBC 6.05 Million/uL      Hemoglobin 16.4 g/dL      Hematocrit 51.9 %      MCV 86 fL      MCH 27.1 pg      MCHC 31.6 g/dL      RDW 14.0 %      MPV 10.9 fL      Platelets 346 Thousands/uL      nRBC 0 /100 WBCs      Neutrophils Relative 75 %      Immat GRANS % 1 %      Lymphocytes Relative 16 %      Monocytes Relative 6 %      Eosinophils Relative 2 %      Basophils Relative 0 %      Neutrophils Absolute 6.88 Thousands/µL      Immature Grans Absolute 0.05 Thousand/uL      Lymphocytes Absolute 1.50 Thousands/µL      Monocytes Absolute 0.59 Thousand/µL      Eosinophils Absolute 0.16 Thousand/µL      Basophils Absolute 0.04 Thousands/µL     Fingerstick Glucose (POCT) [[id number]]  (Abnormal) Collected: 11/27/23 1116    Lab Status: Final result Updated: 11/27/23 1119     POC Glucose 162 mg/dl                    No orders to display              Procedures  Procedures         ED Course  ED Course as of 11/27/23 1552   Mon Nov 27, 2023   1151 I discussed the use of steroids for the patient's back pain with the understanding that this may increase his blood glucose due to his history of diabetes. The patient is agreeable with the plan and understands that he will closely monitor his blood sugar and adjust his dosing as needed   1250 Patient unchanged on reevaluation after medications. I discussed the use of narcotic pain medication. 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 [phone] Patient with some slight improvement in back pain after Dilaudid. Plan for trial of ambulation   1348 Able to stand and take several steps with pain.   The patient was offered admission for his pain and ambulatory dysfunction; however, the patient is opting a trial at home with steroids and narcotics. The patient will also continue the previously prescribed muscle relaxers. The patient will follow-up with comprehensive spine                               SBIRT 22yo+      Flowsheet Row Most Recent Value   Initial Alcohol Screen: US AUDIT-C     1. How often do you have a drink containing alcohol? 0 Filed at: 11/27/2023 1042   2. How many drinks containing alcohol do you have on a typical day you are drinking? 0 Filed at: 11/27/2023 1042   3a. Male UNDER 65: How often do you have five or more drinks on one occasion? 0 Filed at: 11/27/2023 1042   Audit-C Score 0 Filed at: 11/27/2023 1042   CIERRA: How many times in the past year have you. .. Used an illegal drug or used a prescription medication for non-medical reasons? Never Filed at: 11/27/2023 1042                      Medical Decision Making  Differential diagnosis: Lumbar radiculopathy, sacroiliitis, herniated nucleus pulposus, doubt cord impingement. Patient denies any saddle anesthesia, bowel or bladder dysfunction. Will check PVR. Plan for pain control and reassessment. Patient cannot safely ambulate, will consider admission for further evaluation and possible advanced imaging    Patient was able to ambulate with residual pain prior to discharge. Patient was agreeable with the plan for discharge with additional medications and outpatient follow-up. Strict return precautions were discussed. Patient and spouse verbalized understanding of the discharge instructions and warnings. All questions were answered prior to discharge. After the patient was discharged, I got a call from the patient regarding the 7.5 mg oxycodone tablets and not being covered by insurance. They simply 5 mg tablets were discontinued had a secondary E prescription was sent to [facility] for 5 mg oxycodone tablets. Amount and/or Complexity of Data Reviewed  Labs: ordered. Risk  OTC drugs.   Prescription drug management. Disposition  Final diagnoses:   Acute exacerbation of chronic low back pain     Time reflects when diagnosis was documented in both MDM as applicable and the Disposition within this note       Time User Action Codes Description Comment    11/27/2023  1:49 PM Booker Nava Add [M54.50,  G89.29] Acute exacerbation of chronic low back pain           ED Disposition       ED Disposition   Discharge    Condition   Stable    Date/Time   Mon Nov 27, 2023 807 [facility] discharge to home/self care.                    Follow-up Information       Follow up With Specialties Details Why Contact Info Additional Pierre, [address] [facility], Nurse Practitioner Schedule an appointment as soon as possible for a visit  For further evaluation [address]  [address]  [facility] 643  [facility] Emergency Department Emergency Medicine Go to  If symptoms worsen [address] [facility] [phone]  [address] [facility] Emergency Department, [address] [facility] [place] [facility]", [place], [address]            Discharge Medication List as of 11/27/2023  1:53 PM        START taking these medications    Details   methylPREDNISolone 4 MG tablet therapy pack Use as directed on package Do not start before November 28, 2023., Normal      oxyCODONE 7.5 MG TABS Take 7.5 mg by mouth every 4 (four) hours as needed for moderate pain for up to 10 days Max Daily Amount: 45 mg, Starting Mon 11/27/2023, Until Thu 12/7/2023 at 2359, Normal           CONTINUE these medications which have NOT CHANGED    Details   atorvastatin (LIPITOR) 20 mg tablet Take 1 tablet (20 mg total) by mouth daily, Starting Thu 10/12/2023, Normal      Blood Glucose Monitoring Suppl (Contour Next Gen Monitor) w/Device KIT Use to test blood sugars twice a day, Normal      Empagliflozin 25 MG TABS Take 1 tablet (25 mg total) by mouth every morning, Starting Thu 10/12/2023, Normal      escitalopram (Lexapro) 10 mg tablet Take 1 tablet (10 mg total) by mouth daily, Starting Fri 8/5/2022, Normal      glimepiride (AMARYL) 2 mg tablet Take 1 tablet (2 mg total) by mouth 2 (two) times a day before meals, Starting Thu 10/12/2023, Normal      glucose blood (Contour Next Test) test strip Use to test blood sugars twice a day, Normal      Lancets Micro Thin 33G MISC Use to test blood sugars twice a day, Normal      !! methocarbamol (ROBAXIN) 500 mg tablet Take 1 tablet (500 mg total) by mouth 2 (two) times a day, Starting Sat 11/25/2023, Normal      !! methocarbamol (ROBAXIN) 750 mg tablet Take 1 tablet (750 mg total) by mouth daily at bedtime as needed for muscle spasms, Starting Tue 1/25/2022, Normal      Trulicity 3 AC/7.5OY SOPN INJECT 0.5 ML (3 MG TOTAL) UNDER THE SKIN ONCE A WEEK, Starting Mon 12/5/2022, Normal       !! - Potential duplicate medications found. Please discuss with provider. No discharge procedures on file.     PDMP Review         Value Time User    PDMP Reviewed  Yes 11/27/2023  1:50 PM Zohreh Alvarado DO            ED Provider  Electronically Signed by             Zohreh Alvarado DO  11/27/23 9405

## 2023-11-27 NOTE — DISCHARGE INSTRUCTIONS
You should also purchase over-the-counter lidocaine pain patches. Ask the pharmacist for help purchasing an appropriate product.

## 2023-11-28 ENCOUNTER — NURSE TRIAGE (OUTPATIENT)
Dept: PHYSICAL THERAPY | Facility: OTHER | Age: 33
End: 2023-11-28

## 2023-11-28 DIAGNOSIS — M54.50 ACUTE BILATERAL LOW BACK PAIN, UNSPECIFIED WHETHER SCIATICA PRESENT: Primary | ICD-10-CM

## 2023-11-28 NOTE — TELEPHONE ENCOUNTER
Additional Information   Negative: Is this related to a work injury? Negative: Is this related to an MVA? Negative: Are you currently recieving homecare services? Negative: Has the patient had unexplained weight loss? Negative: Does the patient have a fever? Negative: Is the patient experiencing blood in sputum? Negative: Is the patient experiencing urine retention? Negative: Has the patient experienced major trauma? (fall from height, high speed collision, direct blow to spine) and is also experiencing nausea, light-headedness, or loss of consciousness? Negative: Is the patient experiencing acute drop foot or paralysis? Negative: Is this a chronic condition? Background - Initial Assessment  Clinical complaint: Bilateral low back pain that radiates down B/L LE's ( Numbness, pain & tingling)  Date of onset: Started Thanksgiving day 11/23/23-[facility]I  Frequency of pain: Constant  Quality of pain: shooting    Protocols used: [address] Protocol    This RN did review the [facility] in detail and the services offered for his Back or Neck pain. Patient seen in ED x2 for LBP-Returned for 2nd d/t worsening and radiating to LE's. Nurse reviewed the benefits of participating in a thorough evaluation with  [facility] Advanced Therapist.    Patient is agreeable to triage and would like to participate in PT evaluation with  Advanced Spine Therapist. Patient to discuss current/past complaints, HX, possible treatment plan(s), and any necessary referrals or interventions with the DPT at time of service. Triaged and NO RF s/s Present. Referral entered for the [facility]  site and contact information provided to the patient as well. Patient is aware clerical will call to assist with scheduling. Nurse encouraged the patient to call the site if he does not hear from clerical beforehand. Patient agreed. Nurse also offered a call from the [facility] counselor d/t SBT score. Patient declined. Patient was pleasant and appropriate during this encounter. Patient did not voice any additional questions or concerns at this time. All information regarding plan to be evaluated by the therapist was reviewed. Patient is in agreement with nurse's explanation of intended care path discussed today. Patient very appreciative of call, triage, and referral placement. Nurse wished him well and referral closed per protocol.

## 2023-12-04 ENCOUNTER — OFFICE VISIT (OUTPATIENT)
Dept: FAMILY MEDICINE CLINIC | Facility: CLINIC | Age: 33
End: 2023-12-04
Payer: COMMERCIAL

## 2023-12-04 VITALS
BODY MASS INDEX: 40.43 KG/M2 | TEMPERATURE: 98.5 F | RESPIRATION RATE: 18 BRPM | WEIGHT: 315 LBS | SYSTOLIC BLOOD PRESSURE: 133 MMHG | HEART RATE: 89 BPM | OXYGEN SATURATION: 97 % | HEIGHT: 74 IN | DIASTOLIC BLOOD PRESSURE: 82 MMHG

## 2023-12-04 DIAGNOSIS — M54.50 ACUTE EXACERBATION OF CHRONIC LOW BACK PAIN: Primary | ICD-10-CM

## 2023-12-04 DIAGNOSIS — G89.29 ACUTE EXACERBATION OF CHRONIC LOW BACK PAIN: Primary | ICD-10-CM

## 2023-12-04 PROCEDURE — 99213 OFFICE O/P EST LOW 20 MIN: CPT | Performed by: PHYSICIAN ASSISTANT

## 2023-12-04 RX ORDER — OXYCODONE HYDROCHLORIDE 5 MG/1
5 TABLET ORAL EVERY 6 HOURS PRN
Qty: 10 TABLET | Refills: 0 | Status: SHIPPED | OUTPATIENT
Start: 2023-12-04 | End: 2023-12-14

## 2023-12-04 RX ORDER — PREDNISONE 10 MG/1
TABLET ORAL
Qty: 15 TABLET | Refills: 0 | Status: SHIPPED | OUTPATIENT
Start: 2023-12-04 | End: 2023-12-14

## 2023-12-04 NOTE — PROGRESS NOTES
Name: Roula Naylor      : 1990      MRN: [id number]  Encounter Provider: Yennifer Madrigal PA-C  Encounter Date: 2023   Encounter department: [facility]    Assessment & Plan     1. Acute exacerbation of chronic low back pain  -     Ambulatory referral to Spine & Pain Management; Future  -     predniSONE 10 mg tablet; Take 2 tablets (20 mg total) by mouth daily for 5 days, THEN 1 tablet (10 mg total) daily for 5 days. -     oxyCODONE (Roxicodone) 5 immediate release tablet; Take 1 tablet (5 mg total) by mouth every 6 (six) hours as needed for severe pain for up to 10 days Max Daily Amount: 20 mg         Patient missed work on  for the first day. Patient has been on able to work since that time. Patient states may be dropping off FMLA paperwork  Patient aware that the oxycodone will not be a long-term prescription and referral for pain management placed. Go to ED with any worsening or worrisome symptoms. Call with any questions or concerns. Patient states is aware that prednisone increase his blood glucose levels. Feels it is necessary to treat his pain. Subjective      HPI  26-year-old male here today for evaluation of acute on chronic lower back pain with burning sensation down both legs. Patient states has been having back issues for approximately 9 to 10 years. Reports prior history of spinal stenosis and follow-up with pain management in [facility]. No MRI available. Denies any loss of bowel or bladder control. Denies any saddle paresthesia. Was seen in ED x2 was prescribed Medrol Dosepak and some oxycodone states is finally able to walk and did get some relief. However still having chronic issues. Denies any trauma or falls. Denies any prior history of trauma. Patient states pain is exacerbated with standing and lying flat. Also with extension and flexion. Review of Systems   Constitutional: Negative. Respiratory: Negative. Cardiovascular: Negative. Gastrointestinal: Negative. Genitourinary: Negative. Neurological: Negative. Current Outpatient Medications on File Prior to Visit   Medication Sig    atorvastatin (LIPITOR) 20 mg tablet Take 1 tablet (20 mg total) by mouth daily    Blood Glucose Monitoring Suppl (Contour Next Gen Monitor) w/Device KIT Use to test blood sugars twice a day    Empagliflozin 25 MG TABS Take 1 tablet (25 mg total) by mouth every morning    glimepiride (AMARYL) 2 mg tablet Take 1 tablet (2 mg total) by mouth 2 (two) times a day before meals    glucose blood (Contour Next Test) test strip Use to test blood sugars twice a day    Lancets Micro Thin 33G MISC Use to test blood sugars twice a day    methocarbamol (ROBAXIN) 500 mg tablet Take 1 tablet (500 mg total) by mouth 2 (two) times a day    escitalopram (Lexapro) 10 mg tablet Take 1 tablet (10 mg total) by mouth daily (Patient not taking: Reported on 10/12/2023)    methocarbamol (ROBAXIN) 750 mg tablet Take 1 tablet (750 mg total) by mouth daily at bedtime as needed for muscle spasms (Patient not taking: Reported on 1/81/8486)    Trulicity 3 IU/9.1DI SOPN INJECT 0.5 ML (3 MG TOTAL) UNDER THE SKIN ONCE A WEEK (Patient not taking: Reported on 10/12/2023)    [DISCONTINUED] methylPREDNISolone 4 MG tablet therapy pack Use as directed on package Do not start before November 28, 2023.  (Patient not taking: Reported on 12/4/2023)    [DISCONTINUED] oxyCODONE (Roxicodone) 5 immediate release tablet Take 1-2 tablets (5-10 mg total) by mouth every 4 (four) hours as needed for moderate pain for up to 10 days Max Daily Amount: 60 mg (Patient not taking: Reported on 12/4/2023)       Objective     /82 (BP Location: Right arm, Patient Position: Sitting, Cuff Size: Large)   Pulse 89   Temp 98.5 °F (36.9 °C) (Tympanic)   Resp 18   Ht 6' 2" (1.88 m)   Wt (!) 168 kg (371 lb 2.2 oz)   SpO2 97%   BMI 47.65 kg/m²     Physical Exam  Vitals and nursing note reviewed. Constitutional:       General: He is not in acute distress. Appearance: He is obese. He is not ill-appearing, toxic-appearing or diaphoretic. HENT:      Head: Normocephalic and atraumatic. Cardiovascular:      Rate and Rhythm: Normal rate and regular rhythm. Pulmonary:      Effort: Pulmonary effort is normal.      Breath sounds: Normal breath sounds. Abdominal:      Palpations: Abdomen is soft. Tenderness: There is no abdominal tenderness. Musculoskeletal:      Lumbar back: Spasms and tenderness present. No swelling, edema, deformity, signs of trauma, lacerations or bony tenderness. Normal range of motion. No scoliosis. Back:       Comments: Location of tenderness across her lower back. Neurological:      General: No focal deficit present. Mental Status: He is alert and oriented to person, place, and time. Motor: No weakness.    Psychiatric:         Mood and Affect: Mood normal.       Washington Merlin, PA-C

## 2023-12-06 DIAGNOSIS — E78.2 MIXED HYPERLIPIDEMIA: ICD-10-CM

## 2023-12-06 RX ORDER — ATORVASTATIN CALCIUM 20 MG/1
20 TABLET, FILM COATED ORAL DAILY
Qty: 90 TABLET | Refills: 3 | Status: SHIPPED | OUTPATIENT
Start: 2023-12-06

## 2023-12-12 ENCOUNTER — EVALUATION (OUTPATIENT)
Dept: PHYSICAL THERAPY | Facility: CLINIC | Age: 33
End: 2023-12-12
Payer: COMMERCIAL

## 2023-12-12 DIAGNOSIS — M54.42 ACUTE BILATERAL LOW BACK PAIN WITH BILATERAL SCIATICA: Primary | ICD-10-CM

## 2023-12-12 DIAGNOSIS — M54.41 ACUTE BILATERAL LOW BACK PAIN WITH BILATERAL SCIATICA: Primary | ICD-10-CM

## 2023-12-12 PROCEDURE — 97162 PT EVAL MOD COMPLEX 30 MIN: CPT | Performed by: PHYSICAL THERAPIST

## 2023-12-12 NOTE — PROGRESS NOTES
PT Evaluation     Today's date: 2023  Patient name: Lucina Gottlieb  : 1990  MRN: [id number]  Referring provider: Roshan Montez PT  Dx:   Encounter Diagnosis     ICD-10-CM    1. Acute bilateral low back pain with bilateral sciatica  M54.42 Ambulatory referral to PT spine    M54.41                      Assessment  Assessment details: Lucina Gottlieb is a 34 y.o. male who presents with pain, decreased strength, decreased ROM, decreased joint mobility, decreased sensation, ambulatory dysfunction, postural dysfunction, balance dysfunction, and decreased reflexes. Due to these impairments, patient has difficulty performing a/iadls, recreational activities, work-related activities, and engaging in social activities. Patient's clinical presentation is consistent with their referring diagnosis of Acute bilateral low back pain with bilateral sciatica. Pt did have some relief with flexion, but was limited in time in flexion before back pain increased. Pt was advised to trial short durations of l/s flexion in sitting in order to keep symptoms out of legs. Patient has been educated in home exercise program and plan of care. Patient would benefit from skilled physical therapy services to address their aforementioned functional limitations and progress towards prior level of function and independence with home exercise program.   Impairments: abnormal gait, abnormal muscle firing, abnormal or restricted ROM, activity intolerance, impaired balance, impaired physical strength, lacks appropriate home exercise program, pain with function, safety issue, weight-bearing intolerance and poor posture     Symptom irritability: highUnderstanding of Dx/Px/POC: good   Prognosis: good    Goals  Short Term Goals: Target Date 4-6 weeks  1. Pt will initiate and advance HEP. 2. Pt will have < 3/10 pain  3. Pt will have full arom of the l/s  4. Pt will be able to sit to stand with out difficulty    Long Term Goals:   Target Date 8-10 weeks  1. Pt will demonstrate independence in HEP. 2. Pt will have <1/10 pain  3. Pt will have full core and B LE strength  4. Pt will be able to negotiate stairs with out difficulty        Plan  Patient would benefit from: skilled PT  Planned modality interventions: cryotherapy, thermotherapy: hydrocollator packs and TENS  Planned therapy interventions: joint mobilization, manual therapy, patient education, postural training, activity modification, abdominal trunk stabilization, body mechanics training, flexibility, functional ROM exercises, graded exercise, home exercise program, neuromuscular re-education, strengthening, stretching, therapeutic activities, therapeutic exercise, motor coordination training, balance/weight bearing training, ADL training and gait training  Frequency: 1x week  Duration in weeks: 8  Plan of Care beginning date: 12/12/2023  Plan of Care expiration date: 2/6/2024  Treatment plan discussed with: patient      Subjective Evaluation    History of Present Illness  Mechanism of injury: 51-year-old male here today for evaluation of acute on chronic lower back pain with burning sensation down both legs, pt has been to ED twice, and PCP once since onset. Pt was out of work from Nov 27th till currently. Pt currently works as a  with 38-81 hour days. Patient states has been having back issues for approximately 9 to 10 years. But most recent came about after lying on side to cut a [place] tree down. Pt has gone to pain mgmt in past and sees [employer] p[facility] mgmt tomorrow  No MRI available. Denies any loss of bowel or bladder control. Denies any saddle paresthesia. Was seen in ED x2 was prescribed Medrol Dosepak and some oxycodone states did get some relief, had follow steroid pack from PCP which has provided minimal relief. However still having chronic issues. Denies any trauma or falls. Denies any prior history of trauma.   Patient states pain is exacerbated with standing and lying flat, and also minimal improvement with  flexion. Pt notes that he exercises minimally. Pt notes that currently sitting provides relief. In sitting he has a numb feeling in his legs to ankle, in standing it becomes burning stinging pain down legs. Patient Goals  Patient goals for therapy: decreased pain, increased motion, independence with ADLs/IADLs, increased strength, return to sport/leisure activities, improved balance and return to work    Pain  Current pain ratin  At best pain ratin  At worst pain rating: 10  Location: l/s  Quality: burning and discomfort        Objective     Concurrent Complaints  Positive for night pain and disturbed sleep. Negative for bladder dysfunction, bowel dysfunction, saddle (S4) numbness, history of cancer, history of trauma and infection    Static Posture   General Observations  Asymmetrical weight bearing, flexed and guarded. Thoracic Spine  Hyperkyphosis. Lumbar Spine   Flattened. Pelvis   Anterior pelvic tilt    Comments  Pt comes to PT sitting in WC, was dropped off at [place] and wheeled in by wife.     Neurological Testing     Sensation     Lumbar   Left   Intact: light touch, pin prick and sharp/dull discrimination    Right   Intact: light touch, pin prick and sharp/dull discrimination    Reflexes   Left   Patellar (L4): absent (0)  Achilles (S1): absent (0)    Right   Patellar (L4): trace (1+)  Achilles (S1): trace (1+)  Mechanical Assessment    Cervical      Thoracic      Lumbar    Sitting flexion: sustained positions  Pain location: centralized  Pain intensity: better  Standing extension: sustained positions  Pain location: peripheralized  Pain intensity: worse    Strength/Myotome Testing     Left Hip   Planes of Motion   Flexion: 3+    Right Hip   Planes of Motion   Flexion: 3+    Left Knee   Flexion: 4-  Extension: 4-    Right Knee   Flexion: 4-  Extension: 4-    Left Ankle/Foot   Dorsiflexion: 4-    Right Ankle/Foot   Dorsiflexion: 4+    Muscle Activation   Patient able to activate left transverse abdominals, left external obliques, left internal obliques, left multifidus, right transverse abdominals, right external obliques, right internal obliques and right multifidus.               Precautions: Bilateral radicular pain, DM

## 2023-12-13 ENCOUNTER — CONSULT (OUTPATIENT)
Dept: PAIN MEDICINE | Facility: CLINIC | Age: 33
End: 2023-12-13
Payer: COMMERCIAL

## 2023-12-13 VITALS — HEART RATE: 91 BPM | SYSTOLIC BLOOD PRESSURE: 118 MMHG | TEMPERATURE: 98.5 F | DIASTOLIC BLOOD PRESSURE: 82 MMHG

## 2023-12-13 DIAGNOSIS — M54.16 LUMBAR RADICULOPATHY: Primary | ICD-10-CM

## 2023-12-13 DIAGNOSIS — M47.816 LUMBAR SPONDYLOSIS: ICD-10-CM

## 2023-12-13 DIAGNOSIS — R29.898 WEAKNESS OF LEFT LOWER EXTREMITY: ICD-10-CM

## 2023-12-13 PROCEDURE — 99204 OFFICE O/P NEW MOD 45 MIN: CPT | Performed by: ANESTHESIOLOGY

## 2023-12-13 RX ORDER — BACLOFEN 10 MG/1
10 TABLET ORAL 3 TIMES DAILY
Qty: 90 TABLET | Refills: 1 | Status: SHIPPED | OUTPATIENT
Start: 2023-12-13

## 2023-12-13 RX ORDER — GABAPENTIN 300 MG/1
CAPSULE ORAL
Qty: 90 CAPSULE | Refills: 1 | Status: SHIPPED | OUTPATIENT
Start: 2023-12-13

## 2023-12-13 NOTE — PROGRESS NOTES
Assessment  1. Lumbar radiculopathy    2. Lumbar spondylosis    3. Weakness of left lower extremity        Plan      At this point the patient's pain persists despite time, relative rest, activity modification, and nonsteroidal anti-inflammatories. His pain is significantly interfering with his daily living activities. He is unable to perform physical therapy but is continuing to go. He has neurological deficit. He has tried 2 courses of oral steroids been to the emergency department twice. It is appropriate to order an MRI of the lumbar spine to rule out any significant etiology of his symptoms. I will start him on a titrating dose of 10 to address neuropathic component of his pain. I did review the potential side effects of gabapentin, not limited to, but including dizziness, drowsiness, weakness, tired feeling, nausea, diarrhea, constipation, blurred vision, headache, swelling, dry mouth, or loss of balance or coordination. In addition as the Robaxin is not providing relief we will discontinue the methocarbamol start him on baclofen 10 mg 1 p.o. 3 times daily. Reviewed potential side effects of the medication. Once we obtain MRI results, I will follow-up with the patient, review the results and current symptoms, and discuss the next steps of the treatment plan. Patient is to contact our office or present to hospital Emergency Room if experience worsening or new neck/lower extremity pain, sensory or motor change, bladder or bowel dysfunction, or other neurological change. My impressions and treatment recommendations were discussed in detail with the patient who verbalized understanding and had no further questions. Discharge instructions were provided. I personally saw and examined the patient and I agree with the above discussed plan of care. This note is created using dictation transcription.   It may contain typographical errors, grammatical errors, improperly dictated words, background noise and other errors. Orders Placed This Encounter   Procedures    MRI lumbar spine without contrast     Standing Status:   Future     Standing Expiration Date:   12/13/2027     Scheduling Instructions: There is no preparation for this test. Please leave your jewelry and valuables at home, wedding rings are the exception. All patients will be required to change into a hospital gown and pants. Street clothes are not permitted in the MRI. Magnetic nail polish must be removed prior to arrival for your test. Please bring your insurance cards, a form of photo ID and a list of your medications with you. Arrive 15 minutes prior to your appointment time in order to register. Please bring any prior CT or MRI studies of this area that were not performed at a [facility]. To schedule this appointment, please contact Central Scheduling at [phone]. Prior to your appointment, please make sure you complete the MRI Screening Form when you e-Check in for your appointment. This will be available starting 7 days before your appointment in [address] [place] [address]. You may receive an e-mail with an activation code if you do not have a [employer] account. If you do not have access to a device, we will complete your screening at your appointment. Order Specific Question:   What is the patient's sedation requirement? If Medication for Claustrophobia is selected, order medication at this point. Answer:   No Sedation     Order Specific Question:   Does this procedure require the 3T MRI at [facility] or [place]?     Answer:   No     Order Specific Question:   Release to patient through [employer]     Answer:   Immediate     Order Specific Question:   Is order priority selected as STAT?      Answer:   No     Order Specific Question:   Reason for Exam (FREE TEXT)     Answer:   lumbat radic-leg weakness     New Medications Ordered This Visit   Medications    baclofen 10 mg tablet     Sig: Take 1 tablet (10 mg total) by mouth 3 (three) times a day     Dispense:  90 tablet     Refill:  1    gabapentin (NEURONTIN) 300 mg capsule     Sig: Take on tablet at bedtime for 5 days, then one tablet twice daily for 5 days then one tablet three times a day     Dispense:  90 capsule     Refill:  1     Referred By: Kathryn Joy PA-C  History of Present Illness    Simona Jeronimo is a 34 y.o. male with 6 to 8 weeks history of severe low back and lower extremity pain. He is unaware of any clear present event denies any trauma or injury. He went to the emergency department was discharged had 2 courses of oral steroids oxycodone methocarbamol but his symptoms persist which now he is in a wheelchair. His pain is severe rates as 9 out of 10 the visual analog scale is not working. His pain is constant scribes burning shooting with subjective weakness of the lower limbs. Lying down standing and bending all increases symptoms while sitting decreases his symptoms. He denies any loss of bowel or bladder function. I have personally reviewed and/or updated the patient's past medical history, past surgical history, family history, social history, current medications, allergies, and vital signs today.      Review of Systems    Patient Active Problem List   Diagnosis    Type 2 diabetes mellitus with hyperglycemia, without long-term current use of insulin (HCC)    Mixed hyperlipidemia    Essential hypertension    Anxiety    Depression       Past Medical History:   Diagnosis Date    Diabetes mellitus (HCC)     GERD (gastroesophageal reflux disease)     Herniated lumbar intervertebral disc        Past Surgical History:   Procedure Laterality Date    ESOPHAGUS SURGERY      EGD with blood vessel clipping       Family History   Problem Relation Age of Onset    Alcohol abuse Father     Cirrhosis Father     Hypertension Sister     No Known Problems Son     No Known Problems Son        Social History     Occupational History    Occupation:  [employer]   Tobacco Use    Smoking status: Former     Current packs/day: 1.50     Types: Cigarettes     Passive exposure: Never    Smokeless tobacco: Former     Types: Chew   Vaping Use    Vaping status: Former    Substances: Nicotine   Substance and Sexual Activity    Alcohol use: Yes    Drug use: Not Currently    Sexual activity: Not on file       Current Outpatient Medications on File Prior to Visit   Medication Sig    atorvastatin (LIPITOR) 20 mg tablet TAKE 1 TABLET BY MOUTH EVERY DAY    Blood Glucose Monitoring Suppl (Contour Next Gen Monitor) w/Device KIT Use to test blood sugars twice a day    Empagliflozin 25 MG TABS Take 1 tablet (25 mg total) by mouth every morning    glimepiride (AMARYL) 2 mg tablet Take 1 tablet (2 mg total) by mouth 2 (two) times a day before meals    glucose blood (Contour Next Test) test strip Use to test blood sugars twice a day    Lancets Micro Thin 33G MISC Use to test blood sugars twice a day    oxyCODONE (Roxicodone) 5 immediate release tablet Take 1 tablet (5 mg total) by mouth every 6 (six) hours as needed for severe pain for up to 10 days Max Daily Amount: 20 mg    [DISCONTINUED] methocarbamol (ROBAXIN) 500 mg tablet Take 1 tablet (500 mg total) by mouth 2 (two) times a day    [DISCONTINUED] methocarbamol (ROBAXIN) 750 mg tablet Take 1 tablet (750 mg total) by mouth daily at bedtime as needed for muscle spasms    [DISCONTINUED] predniSONE 10 mg tablet Take 2 tablets (20 mg total) by mouth daily for 5 days, THEN 1 tablet (10 mg total) daily for 5 days. escitalopram (Lexapro) 10 mg tablet Take 1 tablet (10 mg total) by mouth daily (Patient not taking: Reported on 57/28/8723)    Trulicity 3 BV/9.9CT SOPN INJECT 0.5 ML (3 MG TOTAL) UNDER THE SKIN ONCE A WEEK (Patient not taking: Reported on 10/12/2023)     No current facility-administered medications on file prior to visit.        Allergies   Allergen Reactions    Metformin GI Intolerance       Physical Exam    BP 118/82 (BP Location: Left arm, Patient Position: Sitting, Cuff Size: Standard)   Pulse 91   Temp 98.5 °F (36.9 °C)     Constitutional: normal, well developed, well nourished, alert, in no distress and non-toxic and no overt pain behavior. and obese  Eyes: anicteric  HEENT: grossly intact  Neck: supple, symmetric, trachea midline and no masses   Pulmonary:even and unlabored  Cardiovascular:No edema or pitting edema present  Skin:Normal without rashes or lesions and well hydrated  Psychiatric:Mood and affect appropriate  Neurologic:Cranial Nerves II-XII grossly intact  Musculoskeletal:normal and in wheelchair, difficulty going from sitting to standing sitting position; no obvious skin lesions or erythema lumbar sacral spine; mild tenderness in lumbar paravertebrals, no sacroiliac or greater trochanteric tenderness bilateral; deep tendon reflexes are diminished but symmetrical bilateral patellar and absent left Achilles 1+ right achilles; 3-5 strength right knee extension no other motor deficits are appreciated; positive straight leg raising on the right. Imaging  LUMBAR SPINE @  1-25-22     INDICATION:   M54.50: Low back pain, unspecified. COMPARISON:  None     VIEWS:  XR SPINE LUMBAR MINIMUM 4 VIEWS NON INJURY        FINDINGS:     There are 5 non rib bearing lumbar vertebral bodies. There is no evidence of acute fracture or destructive osseous lesion. Alignment is unremarkable. Early anterior osteophyte formation is identified at L1-2 and L2-3. The pedicles appear intact. Soft tissues are unremarkable. IMPRESSION:     Minimal early degenerative change    I have personally reviewed pertinent films in PACS and my interpretation is lumbar spondylosis.

## 2023-12-14 ENCOUNTER — TELEPHONE (OUTPATIENT)
Dept: FAMILY MEDICINE CLINIC | Facility: CLINIC | Age: 33
End: 2023-12-14

## 2023-12-14 NOTE — TELEPHONE ENCOUNTER
Patient called today and was looking for disability paperwork. He said it needs to be handed in by 12/15.  I told him you are not in the office today. You saw patient last so I'm assuming you have the paperwork?  If not maybe Ale. Please advise

## 2023-12-15 NOTE — TELEPHONE ENCOUNTER
"Pt informed that form is not complete and reminded that we allow 7-10 business days to complete forms. Pt asked if this could be filled out Monday as today was supposed to be the \"turn in date\" and he does not want to risk it being \"too late\". I told pt I would make a note to try and get forms filled out ASAP but that I could not guarantee anything. Pt verbalized understanding and will speak to work and let them know the situation.  "

## 2023-12-16 LAB
ALBUMIN SERPL-MCNC: 4.3 G/DL (ref 4.1–5.1)
ALBUMIN/GLOB SERPL: 1.8 {RATIO} (ref 1.2–2.2)
ALP SERPL-CCNC: 68 IU/L (ref 44–121)
ALT SERPL-CCNC: 22 IU/L (ref 0–44)
AST SERPL-CCNC: 14 IU/L (ref 0–40)
BASOPHILS # BLD AUTO: 0 X10E3/UL (ref 0–0.2)
BASOPHILS NFR BLD AUTO: 1 %
BILIRUB SERPL-MCNC: 0.5 MG/DL (ref 0–1.2)
BUN SERPL-MCNC: 25 MG/DL (ref 6–20)
BUN/CREAT SERPL: 22 (ref 9–20)
CALCIUM SERPL-MCNC: 9.5 MG/DL (ref 8.7–10.2)
CHLORIDE SERPL-SCNC: 101 MMOL/L (ref 96–106)
CHOLEST SERPL-MCNC: 163 MG/DL (ref 100–199)
CHOLEST/HDLC SERPL: 5.1 RATIO (ref 0–5)
CO2 SERPL-SCNC: 21 MMOL/L (ref 20–29)
CREAT SERPL-MCNC: 1.13 MG/DL (ref 0.76–1.27)
EGFR: 89 ML/MIN/1.73
EOSINOPHIL # BLD AUTO: 0.1 X10E3/UL (ref 0–0.4)
EOSINOPHIL NFR BLD AUTO: 1 %
ERYTHROCYTE [DISTWIDTH] IN BLOOD BY AUTOMATED COUNT: 13.9 % (ref 11.6–15.4)
EST. AVERAGE GLUCOSE BLD GHB EST-MCNC: 171 MG/DL
GLOBULIN SER-MCNC: 2.4 G/DL (ref 1.5–4.5)
GLUCOSE SERPL-MCNC: 106 MG/DL (ref 70–99)
HBA1C MFR BLD: 7.6 % (ref 4.8–5.6)
HCT VFR BLD AUTO: 49.6 % (ref 37.5–51)
HDLC SERPL-MCNC: 32 MG/DL
HGB BLD-MCNC: 16.4 G/DL (ref 13–17.7)
IMM GRANULOCYTES # BLD: 0.1 X10E3/UL (ref 0–0.1)
IMM GRANULOCYTES NFR BLD: 1 %
LDLC SERPL CALC-MCNC: 105 MG/DL (ref 0–99)
LYMPHOCYTES # BLD AUTO: 2.4 X10E3/UL (ref 0.7–3.1)
LYMPHOCYTES NFR BLD AUTO: 27 %
MCH RBC QN AUTO: 28.1 PG (ref 26.6–33)
MCHC RBC AUTO-ENTMCNC: 33.1 G/DL (ref 31.5–35.7)
MCV RBC AUTO: 85 FL (ref 79–97)
MONOCYTES # BLD AUTO: 0.6 X10E3/UL (ref 0.1–0.9)
MONOCYTES NFR BLD AUTO: 7 %
NEUTROPHILS # BLD AUTO: 5.7 X10E3/UL (ref 1.4–7)
NEUTROPHILS NFR BLD AUTO: 63 %
PLATELET # BLD AUTO: 220 X10E3/UL (ref 150–450)
POTASSIUM SERPL-SCNC: 4.2 MMOL/L (ref 3.5–5.2)
PROT SERPL-MCNC: 6.7 G/DL (ref 6–8.5)
RBC # BLD AUTO: 5.83 X10E6/UL (ref 4.14–5.8)
SL AMB VLDL CHOLESTEROL CALC: 26 MG/DL (ref 5–40)
SODIUM SERPL-SCNC: 143 MMOL/L (ref 134–144)
TRIGL SERPL-MCNC: 147 MG/DL (ref 0–149)
TSH SERPL DL<=0.005 MIU/L-ACNC: 1.53 UIU/ML (ref 0.45–4.5)
WBC # BLD AUTO: 8.9 X10E3/UL (ref 3.4–10.8)

## 2023-12-19 ENCOUNTER — TELEPHONE (OUTPATIENT)
Age: 33
End: 2023-12-19

## 2023-12-19 ENCOUNTER — TELEPHONE (OUTPATIENT)
Dept: FAMILY MEDICINE CLINIC | Facility: CLINIC | Age: 33
End: 2023-12-19

## 2023-12-19 ENCOUNTER — APPOINTMENT (OUTPATIENT)
Dept: PHYSICAL THERAPY | Facility: CLINIC | Age: 33
End: 2023-12-19
Payer: COMMERCIAL

## 2023-12-19 ENCOUNTER — PATIENT MESSAGE (OUTPATIENT)
Dept: PAIN MEDICINE | Facility: CLINIC | Age: 33
End: 2023-12-19

## 2023-12-19 DIAGNOSIS — E11.65 TYPE 2 DIABETES MELLITUS WITH HYPERGLYCEMIA, WITHOUT LONG-TERM CURRENT USE OF INSULIN (HCC): ICD-10-CM

## 2023-12-19 RX ORDER — GLIMEPIRIDE 2 MG/1
2 TABLET ORAL
Qty: 180 TABLET | Refills: 2 | Status: SHIPPED | OUTPATIENT
Start: 2023-12-19

## 2023-12-19 NOTE — TELEPHONE ENCOUNTER
[place], DO Jacob Mancera; [facility] [place] [facility]4 minutes ago (1:53 PM)       I do not fill out paperwork as mentioned on his initial H and P form

## 2023-12-19 NOTE — TELEPHONE ENCOUNTER
Patient called and said pain and spine said they do not fill out fmla paperwork. It is the primary care doctors to fill out paperwork. Patients job said they need paper work by the end of the week. Patient isnt sure what to do because no one will fill paperwork out. Patient wants to know if you are able to please fill this paper out for him.  I can print out a new copy of the paperwork

## 2023-12-19 NOTE — TELEPHONE ENCOUNTER
Caller: Patient    Doctor: Juan Carlos     Reason for call: Pt asking how to upload disability documents for the dr. I walked pt through uploading through [employer]. I did advise that [facility] doctors typically do not fill out disability/ keep people out of work, we would recommend that be done by the PCP or whomever was previously keeping him out of work.  Pt states his PCP has been filling out the paperwork, but his employer is now telling him that [employer] needs to be filling paperwork out.  Please advise.      Call back#: [phone]

## 2023-12-22 ENCOUNTER — OFFICE VISIT (OUTPATIENT)
Dept: FAMILY MEDICINE CLINIC | Facility: CLINIC | Age: 33
End: 2023-12-22
Payer: COMMERCIAL

## 2023-12-22 ENCOUNTER — TELEPHONE (OUTPATIENT)
Age: 33
End: 2023-12-22

## 2023-12-22 VITALS
RESPIRATION RATE: 17 BRPM | TEMPERATURE: 97.2 F | DIASTOLIC BLOOD PRESSURE: 86 MMHG | OXYGEN SATURATION: 95 % | HEART RATE: 83 BPM | SYSTOLIC BLOOD PRESSURE: 122 MMHG

## 2023-12-22 DIAGNOSIS — M62.838 MUSCLE SPASM: Primary | ICD-10-CM

## 2023-12-22 PROCEDURE — 99213 OFFICE O/P EST LOW 20 MIN: CPT | Performed by: NURSE PRACTITIONER

## 2023-12-22 RX ORDER — CYCLOBENZAPRINE HCL 10 MG
10 TABLET ORAL 3 TIMES DAILY PRN
Qty: 15 TABLET | Refills: 0 | Status: SHIPPED | OUTPATIENT
Start: 2023-12-22

## 2023-12-22 NOTE — TELEPHONE ENCOUNTER
Caller: patient spouse    Doctor: dl    Reason for call: would like a script for anxiety for his MRI  [id number] [place], [place] - [address] [facility]   Call back#:

## 2023-12-22 NOTE — TELEPHONE ENCOUNTER
Caller: Nurse (Nicola)    Doctor: Juan Carlos    Reason for call: Pt nurse was calling to confirm if Dr barron was or was not filling out paperwork for FMLA and according to notes Dr barron is not filling out FMLA paperwork.     Call back#: [phone]

## 2023-12-22 NOTE — PROGRESS NOTES
"Assessment/Plan   Problem List Items Addressed This Visit    None  Visit Diagnoses       Muscle spasm    -  Primary    Relevant Medications    cyclobenzaprine (FLEXERIL) 10 mg tablet                  Chief Complaint   Patient presents with    FMLA     paperwork       Subjective   Patient ID: Miah Rajan is a 33 y.o. male.    Vitals:    12/22/23 1352   BP: 122/86   Pulse: 83   Resp: 17   Temp: (!) 97.2 °F (36.2 °C)   SpO2: 95%     Here today for completion of FMLA paperwork.  Injured back 11/27/2023 - reports no specific injury \"woke with the pain\"   Has been to ER x's 2   Currently seeing Pain Management   MRI tomorrow   Paperwork completed     Added flexeril for MRI in AM - reports unable to lay flat since this all began           The following portions of the patient's history were reviewed and updated as appropriate: allergies, current medications, past family history, past social history, past surgical history, and problem list.    Review of Systems   Constitutional:  Positive for activity change and fatigue.   HENT: Negative.     Eyes: Negative.    Respiratory: Negative.     Cardiovascular: Negative.    Gastrointestinal: Negative.    Endocrine: Negative.    Genitourinary: Negative.    Musculoskeletal:  Positive for arthralgias, back pain and gait problem.   Skin: Negative.    Allergic/Immunologic: Negative.    Hematological: Negative.    Psychiatric/Behavioral: Negative.         Objective     Physical Exam  Vitals and nursing note reviewed.   Constitutional:       Appearance: He is obese.   HENT:      Head: Normocephalic and atraumatic.      Nose: Nose normal. No congestion.   Eyes:      Extraocular Movements: Extraocular movements intact.      Conjunctiva/sclera: Conjunctivae normal.   Cardiovascular:      Rate and Rhythm: Normal rate and regular rhythm.   Pulmonary:      Effort: Pulmonary effort is normal. No respiratory distress.      Breath sounds: No stridor.   Musculoskeletal:         General: " Tenderness present.      Cervical back: Normal range of motion.      Right lower leg: No edema.      Left lower leg: No edema.        Legs:       Comments: In wheelchair today    Skin:     General: Skin is warm and dry.   Neurological:      General: No focal deficit present.      Mental Status: He is alert and oriented to person, place, and time.   Psychiatric:         Mood and Affect: Mood normal.         Behavior: Behavior normal.         Thought Content: Thought content normal.         Judgment: Judgment normal.

## 2023-12-26 ENCOUNTER — TELEPHONE (OUTPATIENT)
Age: 33
End: 2023-12-26

## 2023-12-26 ENCOUNTER — APPOINTMENT (OUTPATIENT)
Dept: PHYSICAL THERAPY | Facility: CLINIC | Age: 33
End: 2023-12-26
Payer: COMMERCIAL

## 2023-12-26 NOTE — TELEPHONE ENCOUNTER
Caller: rosalva Dooley    Doctor: Juan Carlos    Reason for call: pt called and stated that he had a MRI scheduled this past weekend 12/23 and he was not able to lay flat because of his to get the MRI completed.  Pt would like to know what can be done (medication, under anesthesia)    Call back#: [phone]

## 2023-12-26 NOTE — TELEPHONE ENCOUNTER
I spoke with pt who was only seen in consult with SL  He notes prior to going for his MRI 12/23 he took a flexeril and 3 (5mg) percocet and was unable to lay down due to the pain ( states he got the Percocet from PCP or one of his ED visit.  Aware I would need to discuss this with Dr Rangel for his suggestion for MRI with anesthesia. He is aware the Dr is off this week

## 2023-12-26 NOTE — TELEPHONE ENCOUNTER
Pratibha from [facility] called for a procedure code for the pt's MRI.  I offered the dx code and she could not use that info.  She stated she will give the pt general information for the MRI

## 2023-12-27 ENCOUNTER — APPOINTMENT (OUTPATIENT)
Dept: PHYSICAL THERAPY | Facility: CLINIC | Age: 33
End: 2023-12-27
Payer: COMMERCIAL

## 2023-12-28 ENCOUNTER — HOSPITAL ENCOUNTER (EMERGENCY)
Facility: HOSPITAL | Age: 33
Discharge: HOME | End: 2023-12-28
Attending: EMERGENCY MEDICINE
Payer: COMMERCIAL

## 2023-12-28 ENCOUNTER — TELEPHONE (OUTPATIENT)
Age: 33
End: 2023-12-28

## 2023-12-28 VITALS
WEIGHT: 315 LBS | BODY MASS INDEX: 47.89 KG/M2 | TEMPERATURE: 99.6 F | RESPIRATION RATE: 18 BRPM | SYSTOLIC BLOOD PRESSURE: 112 MMHG | HEART RATE: 99 BPM | OXYGEN SATURATION: 98 % | DIASTOLIC BLOOD PRESSURE: 79 MMHG

## 2023-12-28 DIAGNOSIS — M54.9 CHRONIC BACK PAIN: Primary | ICD-10-CM

## 2023-12-28 DIAGNOSIS — M54.16 LUMBAR RADICULOPATHY: ICD-10-CM

## 2023-12-28 DIAGNOSIS — G89.29 CHRONIC BACK PAIN: Primary | ICD-10-CM

## 2023-12-28 LAB — GLUCOSE SERPL-MCNC: 105 MG/DL (ref 65–140)

## 2023-12-28 PROCEDURE — 99283 EMERGENCY DEPT VISIT LOW MDM: CPT

## 2023-12-28 PROCEDURE — 82948 REAGENT STRIP/BLOOD GLUCOSE: CPT

## 2023-12-28 PROCEDURE — 96372 THER/PROPH/DIAG INJ SC/IM: CPT

## 2023-12-28 PROCEDURE — 99285 EMERGENCY DEPT VISIT HI MDM: CPT | Performed by: PHYSICIAN ASSISTANT

## 2023-12-28 RX ORDER — HYDROMORPHONE HCL/PF 1 MG/ML
1 SYRINGE (ML) INJECTION ONCE
Status: COMPLETED | OUTPATIENT
Start: 2023-12-28 | End: 2023-12-28

## 2023-12-28 RX ORDER — KETOROLAC TROMETHAMINE 30 MG/ML
30 INJECTION, SOLUTION INTRAMUSCULAR; INTRAVENOUS ONCE
Status: COMPLETED | OUTPATIENT
Start: 2023-12-28 | End: 2023-12-28

## 2023-12-28 RX ORDER — LIDOCAINE 50 MG/G
1 PATCH TOPICAL ONCE
Status: DISCONTINUED | OUTPATIENT
Start: 2023-12-28 | End: 2023-12-28 | Stop reason: HOSPADM

## 2023-12-28 RX ORDER — METHOCARBAMOL 500 MG/1
500 TABLET, FILM COATED ORAL ONCE
Status: COMPLETED | OUTPATIENT
Start: 2023-12-28 | End: 2023-12-28

## 2023-12-28 RX ADMIN — KETOROLAC TROMETHAMINE 30 MG: 30 INJECTION, SOLUTION INTRAMUSCULAR; INTRAVENOUS at 17:31

## 2023-12-28 RX ADMIN — METHOCARBAMOL TABLETS 500 MG: 500 TABLET, COATED ORAL at 17:31

## 2023-12-28 RX ADMIN — LIDOCAINE 1 PATCH: 50 PATCH TOPICAL at 17:31

## 2023-12-28 RX ADMIN — HYDROMORPHONE HYDROCHLORIDE 1 MG: 1 INJECTION, SOLUTION INTRAMUSCULAR; INTRAVENOUS; SUBCUTANEOUS at 17:32

## 2023-12-28 NOTE — ED NOTES
Pt ambulated approximately 15-20 feet with assistance of his walker. Provider at bedside.      Norma Whitten RN  12/28/23 7478

## 2023-12-28 NOTE — TELEPHONE ENCOUNTER
Caller: Miah Rajan Mother     Doctor: Dr Rangel    Reason for call: Patient mother calling stating unable to do MRI unable to lie down and would like to speak to nurse regarding script with sedation for MRI please advise     Call back#: [phone]

## 2023-12-28 NOTE — ED PROVIDER NOTES
"History  Chief Complaint   Patient presents with    Back Pain     Patient complaint of \" back pain radiating to both legs x 1 month \"     Patient is a 34 y/o M with h/o DM, back pain that presents to the ED with worsening back pain.  He states the pain radiates down both legs.  He states the toes on his left foot sometimes go numb.  He states his whole body feels weak, but denies bowel/bladder dysfunction.  He states he has been on steroids x 2, narcotics, muscle relaxants, neurontin, but nothing helps the pain.  He went to PT once and they therapist requested an MRI.  He saw Pain management and was supposed to get an MRI this past Saturday, but states he was unable to lay flat for the test.  He called his pain management doctor, but states he is currently on vacation.  No fevers, chills.  Patient did not take anything for pain today. He mentioned last time he was in the ER admission was discussed and he thinks he needs to be admitted.       Back Pain  Associated symptoms: no dysuria, no fever, no numbness and no weakness        Prior to Admission Medications   Prescriptions Last Dose Informant Patient Reported? Taking?   Blood Glucose Monitoring Suppl (Contour Next Gen Monitor) w/Device KIT  Self No No   Sig: Use to test blood sugars twice a day   Empagliflozin 25 MG TABS  Self No No   Sig: Take 1 tablet (25 mg total) by mouth every morning   Lancets Micro Thin 33G MISC  Self No No   Sig: Use to test blood sugars twice a day   Trulicity 3 MG/0.5ML SOPN  Self No No   Sig: INJECT 0.5 ML (3 MG TOTAL) UNDER THE SKIN ONCE A WEEK   Patient not taking: Reported on 10/12/2023   atorvastatin (LIPITOR) 20 mg tablet  Self No No   Sig: TAKE 1 TABLET BY MOUTH EVERY DAY   baclofen 10 mg tablet  Self No No   Sig: Take 1 tablet (10 mg total) by mouth 3 (three) times a day   cyclobenzaprine (FLEXERIL) 10 mg tablet   No No   Sig: Take 1 tablet (10 mg total) by mouth 3 (three) times a day as needed for muscle spasms   escitalopram " (Lexapro) 10 mg tablet  Self No No   Sig: Take 1 tablet (10 mg total) by mouth daily   Patient not taking: Reported on 10/12/2023   gabapentin (NEURONTIN) 300 mg capsule  Self No No   Sig: Take on tablet at bedtime for 5 days, then one tablet twice daily for 5 days then one tablet three times a day   glimepiride (AMARYL) 2 mg tablet  Self No No   Sig: TAKE 1 TABLET (2 MG TOTAL) BY MOUTH 2 (TWO) TIMES A DAY BEFORE MEALS   glucose blood (Contour Next Test) test strip  Self No No   Sig: Use to test blood sugars twice a day      Facility-Administered Medications: None       Past Medical History:   Diagnosis Date    Diabetes mellitus (HCC)     GERD (gastroesophageal reflux disease)     Herniated lumbar intervertebral disc        Past Surgical History:   Procedure Laterality Date    ESOPHAGUS SURGERY      EGD with blood vessel clipping       Family History   Problem Relation Age of Onset    Alcohol abuse Father     Cirrhosis Father     Hypertension Sister     No Known Problems Son     No Known Problems Son      I have reviewed and agree with the history as documented.    E-Cigarette/Vaping    E-Cigarette Use Former User      E-Cigarette/Vaping Substances    Nicotine Yes     THC No     CBD No     Flavoring No     Other No     Unknown No      Social History     Tobacco Use    Smoking status: Former     Current packs/day: 1.50     Types: Cigarettes     Passive exposure: Never    Smokeless tobacco: Former     Types: Chew   Vaping Use    Vaping status: Former    Substances: Nicotine   Substance Use Topics    Alcohol use: Yes    Drug use: Not Currently       Review of Systems   Constitutional:  Negative for chills and fever.   Genitourinary:  Negative for decreased urine volume, dysuria and frequency.   Musculoskeletal:  Positive for back pain. Negative for neck pain.   Skin:  Negative for color change, rash and wound.   Neurological:  Negative for dizziness, weakness and numbness.   All other systems reviewed and are  negative.      Physical Exam  Physical Exam  Vitals and nursing note reviewed.   Constitutional:       General: He is not in acute distress.     Appearance: Normal appearance. He is well-developed and well-groomed. He is obese. He is not ill-appearing or diaphoretic.   HENT:      Head: Normocephalic and atraumatic.      Right Ear: External ear normal.      Left Ear: External ear normal.      Nose: Nose normal.   Eyes:      Conjunctiva/sclera: Conjunctivae normal.   Cardiovascular:      Rate and Rhythm: Normal rate and regular rhythm.   Pulmonary:      Effort: Pulmonary effort is normal.      Breath sounds: Normal breath sounds. No wheezing, rhonchi or rales.   Abdominal:      General: Abdomen is flat. Bowel sounds are normal.      Palpations: Abdomen is soft.      Tenderness: There is no abdominal tenderness.   Musculoskeletal:      Cervical back: Normal and normal range of motion. No spinous process tenderness or muscular tenderness.      Thoracic back: Normal.      Lumbar back: Tenderness present. No swelling, deformity or bony tenderness. Positive right straight leg raise test and positive left straight leg raise test.   Skin:     General: Skin is warm and dry.      Coloration: Skin is not pale.      Findings: No bruising or erythema.   Neurological:      Mental Status: He is alert and oriented to person, place, and time.      GCS: GCS eye subscore is 4. GCS verbal subscore is 5. GCS motor subscore is 6.      Sensory: Sensation is intact. No sensory deficit.      Motor: Motor function is intact. No weakness, tremor or abnormal muscle tone.      Comments: Patient able to dorsiflex b/l feet.    Psychiatric:         Behavior: Behavior is cooperative.         Vital Signs  ED Triage Vitals [12/28/23 1320]   Temperature Pulse Respirations Blood Pressure SpO2   99.6 °F (37.6 °C) 99 18 112/79 98 %      Temp src Heart Rate Source Patient Position - Orthostatic VS BP Location FiO2 (%)   -- -- -- -- --      Pain Score        10 - Worst Possible Pain           Vitals:    12/28/23 1320   BP: 112/79   Pulse: 99         Visual Acuity      ED Medications  Medications   lidocaine (LIDODERM) 5 % patch 1 patch (1 patch Topical Medication Applied 12/28/23 1731)   ketorolac (TORADOL) injection 30 mg (30 mg Intramuscular Given 12/28/23 1731)   methocarbamol (ROBAXIN) tablet 500 mg (500 mg Oral Given 12/28/23 1731)   HYDROmorphone (DILAUDID) injection 1 mg (1 mg Intramuscular Given 12/28/23 1732)       Diagnostic Studies  Results Reviewed       Procedure Component Value Units Date/Time    Fingerstick Glucose (POCT) [[id number]]  (Normal) Collected: 12/28/23 1732    Lab Status: Final result Updated: 12/28/23 1733     POC Glucose 105 mg/dl                    No orders to display              Procedures  Procedures         ED Course  ED Course as of 12/28/23 1835   Thu Dec 28, 2023   1822 Patient states he continues with pain, has minimal relief with pain.  Discussed possible admission with patient, he would like to discuss with girlfriend.    1829 Patient declines admission.  He states he just wants an MRI and will discuss with Dr. Eugenie cook.                                SBIRT 22yo+      Flowsheet Row Most Recent Value   Initial Alcohol Screen: US AUDIT-C     1. How often do you have a drink containing alcohol? 0 Filed at: 12/28/2023 1831   2. How many drinks containing alcohol do you have on a typical day you are drinking?  0 Filed at: 12/28/2023 1831   3a. Male UNDER 65: How often do you have five or more drinks on one occasion? 0 Filed at: 12/28/2023 1831   3b. FEMALE Any Age, or MALE 65+: How often do you have 4 or more drinks on one occassion? 0 Filed at: 12/28/2023 1831   Audit-C Score 0 Filed at: 12/28/2023 1831   CIERRA: How many times in the past year have you...    Used an illegal drug or used a prescription medication for non-medical reasons? Never Filed at: 12/28/2023 1831                      Medical Decision Making  Patient with chronic  back pain , worsening pain.  No bowel/bladder dsyfunction, numbness or weakness, no need for emergent MRI.  He had improvement with pain meds and was able to ambulate with a cane.  Patient offered admission for intractable pain, but declined and states he will f/u with pain management.     Amount and/or Complexity of Data Reviewed  Labs: ordered.    Risk  Prescription drug management.  Decision regarding hospitalization.             Disposition  Final diagnoses:   Chronic back pain   Lumbar radiculopathy     Time reflects when diagnosis was documented in both MDM as applicable and the Disposition within this note       Time User Action Codes Description Comment    12/28/2023  5:35 PM Em Buitrago [M54.9,  G89.29] Chronic back pain     12/28/2023  5:35 PM Em Buitrago [M54.16] Lumbar radiculopathy           ED Disposition       ED Disposition   Discharge    Condition   Stable    Date/Time   Thu Dec 28, 2023 1828    Comment   Miah Rajan discharge to home/self care.                   Follow-up Information       Follow up With Specialties Details Why Contact Info    Lake Rangel DO Pain Medicine Call in 1 day For recheck [address] [place] [address] [phone]              Patient's Medications   Discharge Prescriptions    No medications on file       No discharge procedures on file.    PDMP Review         Value Time User    PDMP Reviewed  Yes 12/4/2023  2:03 PM Guevara Villar PA-C            ED Provider  Electronically Signed by             Em Buitrago PA-C  12/28/23 0933

## 2023-12-29 NOTE — TELEPHONE ENCOUNTER
Caller: Mia pt's mom    Doctor: Juan Carlos    Reason for call: Pt's mom called and is trying to help her son with his back pain.  Pt can not lay flat because of the pain and has been sitting in a chair to sleep.  mom is wondering if he can get the MRI under sedation    Call back#: [phone]

## 2023-12-29 NOTE — TELEPHONE ENCOUNTER
S/w pt, confirmed er visit of yesterday. Pt stated that he is feeling better, but he hasn't gotten up yet. No rx provided. Advised pt to continue with rest, ice / heat, medication as directed or prescribed.    Confirmed pt was unable to complete MRI as scheduled at [facility] s/t pain with supine position. Advised pt, the writer will d/w Dr. Rangel upon his return to the office on tuesday and cb to advise. Pt verbalized understanding and appreciation.     Forwarding to Dr. Larsen for confirmation s/t recent er visit and upcoming prolonged weekend.

## 2024-01-02 DIAGNOSIS — R29.898 WEAKNESS OF LOWER EXTREMITY, UNSPECIFIED LATERALITY: Primary | ICD-10-CM

## 2024-01-02 DIAGNOSIS — M54.16 LUMBAR RADICULOPATHY: ICD-10-CM

## 2024-01-02 NOTE — TELEPHONE ENCOUNTER
Attempted to contact amira at the cb number provided. No ans / no vm. The writer will fu.         NOTE:    Per central :  We can use 12/22/23 when he seen his PCP. As long as it’s within 30 days of the scheduled MRI and he had the basic, heart, lung, BP assessed, which he did.  3:53 PM  20 days left

## 2024-01-02 NOTE — TELEPHONE ENCOUNTER
S/w pts wife, amira. Advised of above. Per Amira, she scheduled the MRI however a physical is required and the pt's pcp does not have an opening for a physical in time for the mri. Questioned if this office could do it. Advised Amira, would recommend urgent care as they can do work / school / sports physicals. Possibly discuss these details with central scheduling. Amira verbalized understanding and appreciation.

## 2024-01-02 NOTE — TELEPHONE ENCOUNTER
S/w Alyson per release of health info on file. Questioned if the pt has ever used ativan or xanax. Per Alyson, not that she is aware. Advised Alyson, the writer will d/w Dr. Rangel, changing the order to include sedation however, this may add a considerable wait time to the MRI. If the pt is willing to try ativan or xanax, this will be likely be scheduled faster. Per Alyson, the pt would prefer to be scheduled with sedation s/t pain. Per Alyson, the pt cannot tolerate laying down for the imaging. Advised Alyson, the writer will d/w Dr. Rangel and cb to advise. Alyson verbalized understanding and appreciation.

## 2024-01-02 NOTE — TELEPHONE ENCOUNTER
Caller: Miah Rajan Wife     Doctor: Dr Reed     Reason for call: Patient wife calling asking for update on MRI with sedation please advise     Call back#: [phone]

## 2024-01-03 NOTE — TELEPHONE ENCOUNTER
Attempted to contact amira at the cb number provided. lmom to cb. Provided cb number and office hours.

## 2024-01-04 NOTE — TELEPHONE ENCOUNTER
Caller: Alyson    Doctor: Juan Carlos    Reason for call: Pt is returning RN call     Call back#: [phone]

## 2024-01-08 NOTE — TELEPHONE ENCOUNTER
"OK    S/w Alyson per medical communication consent on file. Advised Alyson, if the pt would like to try the MRI with ativan / xanax, this office will send a rx. However, if the pt wants to \"try it\" at home first, he should probably discuss this medication with his pcp. This office would advise the pt to attempt the mri with the ativan or call central scheduling re: cancellation list. Alyson verbalized understanding and appreciation. Stated that she does not anticipate the ativan providing enough relief for the pt to lay flat. Advised Alyson to cb if there is any question or change in the plan. Alyson verbalized understanding and appreciation.   "

## 2024-01-08 NOTE — TELEPHONE ENCOUNTER
Caller:  Alyson    Doctor: Juan Carlos    Reason for call: Alyson has questions in regards to the MRI the pt needs. She wanted to know if a prescription can be prescribed for the patient.   Please Advise    Call back#: [phone]

## 2024-01-08 NOTE — TELEPHONE ENCOUNTER
Caller: rosalva Dooley    Doctor: Juan Carlos    Reason for call: Pt has questions about MRI pt is looking to have MRI done outside of the network, and needs a copy of the MRI order. Please Advise    Call back#: [phone]

## 2024-01-12 ENCOUNTER — HOSPITAL ENCOUNTER (OUTPATIENT)
Dept: MRI IMAGING | Facility: HOSPITAL | Age: 34
Discharge: HOME | End: 2024-01-12
Attending: ANESTHESIOLOGY

## 2024-01-17 ENCOUNTER — TELEPHONE (OUTPATIENT)
Dept: FAMILY MEDICINE CLINIC | Facility: CLINIC | Age: 34
End: 2024-01-17

## 2024-01-17 ENCOUNTER — TELEPHONE (OUTPATIENT)
Age: 34
End: 2024-01-17

## 2024-01-18 NOTE — PRE-PROCEDURE INSTRUCTIONS
Pre-Surgery Instructions:   Medication Instructions    atorvastatin (LIPITOR) 20 mg tablet Take day of surgery.    baclofen 10 mg tablet Take day of surgery.    Empagliflozin 25 MG TABS Stop taking 4 days prior to surgery.    gabapentin (NEURONTIN) 300 mg capsule Take day of surgery.    glimepiride (AMARYL) 2 mg tablet Hold day of surgery.    The patient should have nothing to eat or drink after 11 pm the night before the MRI. The patient may take their medications with a sip of water at least 2 hours prior to their arrival time.  You will receive a call the evening before your MRI appointment with additional instructions.      Please leave your jewelry and valuables at home, wedding rings are the exception.   Please bring your physician order, insurance cards, a form of photo ID and a list of your medications with you. Arrive 15 minutes prior to your appointment time in order to register. Please bring any prior CT or MRI studies of this area that were not performed at a [facility] facility.

## 2024-01-18 NOTE — TELEPHONE ENCOUNTER
S/w pharmacist. Confirmed that 12/13/23 rx for baclofen with 1 refill. Not cancelled by any provider per epic. Pharmacist verbalized understanding and appreciation. Stated that the rx will be prepared for the pt and the pt will be notified.

## 2024-01-22 ENCOUNTER — TELEPHONE (OUTPATIENT)
Dept: PAIN MEDICINE | Facility: CLINIC | Age: 34
End: 2024-01-22

## 2024-01-22 ENCOUNTER — TELEPHONE (OUTPATIENT)
Age: 34
End: 2024-01-22

## 2024-01-22 ENCOUNTER — HOSPITAL ENCOUNTER (OUTPATIENT)
Dept: RADIOLOGY | Facility: HOSPITAL | Age: 34
Discharge: HOME | End: 2024-01-22
Attending: ANESTHESIOLOGY
Payer: COMMERCIAL

## 2024-01-22 VITALS
DIASTOLIC BLOOD PRESSURE: 85 MMHG | TEMPERATURE: 97.5 F | HEART RATE: 84 BPM | OXYGEN SATURATION: 97 % | SYSTOLIC BLOOD PRESSURE: 133 MMHG | RESPIRATION RATE: 20 BRPM

## 2024-01-22 DIAGNOSIS — M54.16 LUMBAR RADICULOPATHY: ICD-10-CM

## 2024-01-22 DIAGNOSIS — R29.898 WEAKNESS OF LOWER EXTREMITY, UNSPECIFIED LATERALITY: ICD-10-CM

## 2024-01-22 LAB — GLUCOSE SERPL-MCNC: 141 MG/DL (ref 65–140)

## 2024-01-22 PROCEDURE — 82948 REAGENT STRIP/BLOOD GLUCOSE: CPT

## 2024-01-22 RX ORDER — PROMETHAZINE HYDROCHLORIDE 25 MG/ML
12.5 INJECTION, SOLUTION INTRAMUSCULAR; INTRAVENOUS ONCE AS NEEDED
Status: CANCELLED | OUTPATIENT
Start: 2024-01-22

## 2024-01-22 RX ORDER — MEPERIDINE HYDROCHLORIDE 25 MG/ML
12.5 INJECTION INTRAMUSCULAR; INTRAVENOUS; SUBCUTANEOUS
Status: CANCELLED | OUTPATIENT
Start: 2024-01-22

## 2024-01-22 RX ORDER — SODIUM CHLORIDE, SODIUM LACTATE, POTASSIUM CHLORIDE, CALCIUM CHLORIDE 600; 310; 30; 20 MG/100ML; MG/100ML; MG/100ML; MG/100ML
20 INJECTION, SOLUTION INTRAVENOUS CONTINUOUS
Status: DISCONTINUED | OUTPATIENT
Start: 2024-01-22 | End: 2024-01-23 | Stop reason: HOSPADM

## 2024-01-22 RX ORDER — ONDANSETRON 2 MG/ML
4 INJECTION INTRAMUSCULAR; INTRAVENOUS ONCE AS NEEDED
Status: CANCELLED | OUTPATIENT
Start: 2024-01-22

## 2024-01-22 RX ORDER — ALBUTEROL SULFATE 2.5 MG/3ML
2.5 SOLUTION RESPIRATORY (INHALATION) ONCE AS NEEDED
Status: CANCELLED | OUTPATIENT
Start: 2024-01-22

## 2024-01-22 RX ORDER — FENTANYL CITRATE/PF 50 MCG/ML
25 SYRINGE (ML) INJECTION
Status: CANCELLED | OUTPATIENT
Start: 2024-01-22

## 2024-01-22 RX ORDER — HYDROMORPHONE HCL/PF 1 MG/ML
0.5 SYRINGE (ML) INJECTION
Status: CANCELLED | OUTPATIENT
Start: 2024-01-22

## 2024-01-22 RX ADMIN — SODIUM CHLORIDE, SODIUM LACTATE, POTASSIUM CHLORIDE, AND CALCIUM CHLORIDE 20 ML/HR: .6; .31; .03; .02 INJECTION, SOLUTION INTRAVENOUS at 10:05

## 2024-01-22 NOTE — NURSING NOTE
Procedure unable to be completed due to patient failing fit test in MRI scanner due to broad shoulders. No medications or anesthesia was administered. [facility] RN Kodi called with report. Patient put in for transport to return back to [facility]. Patient recommended to reach out to referring provider regarding how to proceed from here.

## 2024-01-22 NOTE — TELEPHONE ENCOUNTER
Caller: Miah Rajan     Doctor: Dr Rangel    Reason for call: Patient wife calling stating if she can get a call back regarding prior auth for MRI due to having a slot for patient this afternoon please advise     Call back#: [phone]

## 2024-01-22 NOTE — TELEPHONE ENCOUNTER
Forwarding to [facility] surg magda and Dr. Juan Carlos EUCEDA    S/w pt's mother, Mia, stated that his mri today at [facility] has been cancelled - the pt could not fit in the machine. Mia expressed great frustration, stating that this is the second incomplete MRI. The pt had a physical for this s/t anesthesia and did not tell the pt that there would be a problem with his size.     Mia questioned where the pt can go for a mri. Advised Mia, the pt should be able to go to any MRI facility with a large bore MRI. Per Mia, they are looking into [facility] or [facility]. Advised Mia, please let this office know where the MRI is scheduled and, if possible, provide a fax number to fax the orders to. This office will complete the prior auth due to the change in location and cb with an A number which is an authorization for the MRI. Mia verbalized understanding and appreciation. Stated that she will proceed as discussed.

## 2024-01-22 NOTE — TELEPHONE ENCOUNTER
Caller: Mia (Pts mother)    Doctor: Juan Carlos    Reason for call: Pt mother is a bit upset because she hasn't received any calls back inregard to her son he is having a lot of pain and needs to get MRI done and would like to speak to a nurse asap     Call back#: [phone]

## 2024-01-22 NOTE — TELEPHONE ENCOUNTER
Spoke with wife advised that auth team will work on changing location to [facility] and gave auth number so that she can schedule. Advised that I will call once auth has been updated but that auth number will stay the same.

## 2024-01-22 NOTE — TELEPHONE ENCOUNTER
Caller: patient mom christie    Doctor/Office: dl    Call regarding :  calling to speak with RN     Call was transferred to: RN

## 2024-01-23 ENCOUNTER — OFFICE VISIT (OUTPATIENT)
Dept: FAMILY MEDICINE CLINIC | Facility: CLINIC | Age: 34
End: 2024-01-23
Payer: COMMERCIAL

## 2024-01-23 VITALS
OXYGEN SATURATION: 93 % | DIASTOLIC BLOOD PRESSURE: 94 MMHG | SYSTOLIC BLOOD PRESSURE: 150 MMHG | HEIGHT: 74 IN | RESPIRATION RATE: 18 BRPM | HEART RATE: 89 BPM | TEMPERATURE: 97.2 F | WEIGHT: 315 LBS | BODY MASS INDEX: 40.43 KG/M2

## 2024-01-23 DIAGNOSIS — I10 ESSENTIAL HYPERTENSION: ICD-10-CM

## 2024-01-23 DIAGNOSIS — M54.16 LUMBAR RADICULOPATHY: ICD-10-CM

## 2024-01-23 DIAGNOSIS — Z00.00 ANNUAL PHYSICAL EXAM: Primary | ICD-10-CM

## 2024-01-23 DIAGNOSIS — E78.2 MIXED HYPERLIPIDEMIA: ICD-10-CM

## 2024-01-23 DIAGNOSIS — E11.65 TYPE 2 DIABETES MELLITUS WITH HYPERGLYCEMIA, WITHOUT LONG-TERM CURRENT USE OF INSULIN (HCC): ICD-10-CM

## 2024-01-23 PROCEDURE — 99395 PREV VISIT EST AGE 18-39: CPT | Performed by: FAMILY MEDICINE

## 2024-01-23 PROCEDURE — 99214 OFFICE O/P EST MOD 30 MIN: CPT | Performed by: FAMILY MEDICINE

## 2024-01-23 NOTE — PROGRESS NOTES
ADULT ANNUAL PHYSICAL  [facility]    NAME: Miah Rajan  AGE: 33 y.o. SEX: male  : 1990     DATE: 2024     Assessment and Plan:     Problem List Items Addressed This Visit          Endocrine    Type 2 diabetes mellitus with hyperglycemia, without long-term current use of insulin (HCC)       Lab Results   Component Value Date    HGBA1C 7.6 (H) 12/15/2023     Follows with endocrine who manages his diabetes             Cardiovascular and Mediastinum    Essential hypertension       Nervous and Auditory    Lumbar radiculopathy     Paperwork completed for work  He is a  but currently out of work due to severe back pain  Following with pain and spine  Was unable to get MRI done yesterday due to machine size  He will contact Dr. Marshall office for further advisement  Plan for follow up here in 3 months or sooner if needed             Other    Mixed hyperlipidemia     Other Visit Diagnoses       Annual physical exam    -  Primary            Immunizations and preventive care screenings were discussed with patient today. Appropriate education was printed on patient's after visit summary.    Counseling:  Alcohol/drug use: discussed moderation in alcohol intake, the recommendations for healthy alcohol use, and avoidance of illicit drug use.  Dental Health: discussed importance of regular tooth brushing, flossing, and dental visits.  Injury prevention: discussed safety/seat belts, safety helmets, smoke detectors, carbon dioxide detectors, and smoking near bedding or upholstery.  Sexual health: discussed sexually transmitted diseases, partner selection, use of condoms, avoidance of unintended pregnancy, and contraceptive alternatives.  Exercise: the importance of regular exercise/physical activity was discussed. Recommend exercise 3-5 times per week for at least 30 minutes.          Return in about 3 months (around 2024) for follow up for  disability paperwork.     Chief Complaint:     Chief Complaint   Patient presents with    Follow-up     Check up-new to you       History of Present Illness:     Adult Annual Physical   Patient here for a comprehensive physical exam. The patient reports problems - back pain .    Diet and Physical Activity  Diet/Nutrition: well balanced diet.   Exercise: no formal exercise.      Depression Screening  PHQ-2/9 Depression Screening    Little interest or pleasure in doing things: 0 - not at all  Feeling down, depressed, or hopeless: 0 - not at all  Trouble falling or staying asleep, or sleeping too much: 0 - not at all  Feeling tired or having little energy: 0 - not at all  Poor appetite or overeatin - not at all  Feeling bad about yourself - or that you are a failure or have let yourself or your family down: 0 - not at all  Trouble concentrating on things, such as reading the newspaper or watching television: 0 - not at all  Moving or speaking so slowly that other people could have noticed. Or the opposite - being so fidgety or restless that you have been moving around a lot more than usual: 0 - not at all  Thoughts that you would be better off dead, or of hurting yourself in some way: 0 - not at all  PHQ-9 Score: 0  PHQ-9 Interpretation: No or Minimal depression        Review of Systems:     Review of Systems   Constitutional:  Negative for chills and fever.   HENT:  Negative for congestion, postnasal drip, rhinorrhea and sinus pain.    Eyes:  Negative for photophobia and visual disturbance.   Respiratory:  Negative for cough and shortness of breath.    Cardiovascular:  Negative for chest pain, palpitations and leg swelling.   Gastrointestinal:  Negative for abdominal pain, constipation, diarrhea, nausea and vomiting.   Genitourinary:  Negative for difficulty urinating and dysuria.   Musculoskeletal:  Positive for back pain. Negative for arthralgias and myalgias.   Skin:  Negative for rash.   Neurological:   Negative for dizziness and syncope.      Past Medical History:     Past Medical History:   Diagnosis Date    Diabetes mellitus (HCC)     GERD (gastroesophageal reflux disease)     Herniated lumbar intervertebral disc       Past Surgical History:     Past Surgical History:   Procedure Laterality Date    ESOPHAGUS SURGERY      EGD with blood vessel clipping      Social History:     Social History     Socioeconomic History    Marital status: Single     Spouse name: None    Number of children: None    Years of education: None    Highest education level: None   Occupational History    Occupation:  [employer]   Tobacco Use    Smoking status: Former     Current packs/day: 1.50     Types: Cigarettes     Passive exposure: Never    Smokeless tobacco: Former     Types: Chew   Vaping Use    Vaping status: Former    Substances: Nicotine   Substance and Sexual Activity    Alcohol use: Yes     Comment: social    Drug use: Not Currently    Sexual activity: None   Other Topics Concern    None   Social History Narrative    None     Social Determinants of Health     Financial Resource Strain: Not on file   Food Insecurity: Not on file   Transportation Needs: Not on file   Physical Activity: Inactive (8/5/2022)    Exercise Vital Sign     Days of Exercise per Week: 0 days     Minutes of Exercise per Session: 0 min   Stress: Stress Concern Present (8/5/2022)    [employer] [place] [employer] - Occupational Stress Questionnaire     Feeling of Stress : Rather much   Social Connections: Not on file   Intimate Partner Violence: Not At Risk (8/5/2022)    Humiliation, Afraid, Rape, and Kick questionnaire     Fear of Current or Ex-Partner: No     Emotionally Abused: No     Physically Abused: No     Sexually Abused: No   Housing Stability: Not on file      Family History:     Family History   Problem Relation Age of Onset    Alcohol abuse Father     Cirrhosis Father     Hypertension Sister     No Known Problems Son   "   No Known Problems Son       Current Medications:     Current Outpatient Medications   Medication Sig Dispense Refill    atorvastatin (LIPITOR) 20 mg tablet TAKE 1 TABLET BY MOUTH EVERY DAY 90 tablet 3    baclofen 10 mg tablet Take 1 tablet (10 mg total) by mouth 3 (three) times a day 90 tablet 1    Blood Glucose Monitoring Suppl (Contour Next Gen Monitor) w/Device KIT Use to test blood sugars twice a day 1 kit 0    Empagliflozin 25 MG TABS Take 1 tablet (25 mg total) by mouth every morning 30 tablet 6    gabapentin (NEURONTIN) 300 mg capsule Take on tablet at bedtime for 5 days, then one tablet twice daily for 5 days then one tablet three times a day 90 capsule 1    glimepiride (AMARYL) 2 mg tablet TAKE 1 TABLET (2 MG TOTAL) BY MOUTH 2 (TWO) TIMES A DAY BEFORE MEALS 180 tablet 2    glucose blood (Contour Next Test) test strip Use to test blood sugars twice a day 100 each 7    Lancets Micro Thin 33G MISC Use to test blood sugars twice a day 100 each 7     No current facility-administered medications for this visit.      Allergies:     Allergies   Allergen Reactions    Metformin GI Intolerance      Physical Exam:     /94 (BP Location: Left arm, Patient Position: Sitting, Cuff Size: Large)   Pulse 89   Temp (!) 97.2 °F (36.2 °C) (Tympanic)   Resp 18   Ht 6' 2\" (1.88 m)   Wt (!) 170 kg (374 lb 12.8 oz)   SpO2 93%   BMI 48.12 kg/m²     Physical Exam  Constitutional:       General: He is not in acute distress.     Appearance: Normal appearance. He is not ill-appearing, toxic-appearing or diaphoretic.   HENT:      Head: Normocephalic and atraumatic.      Right Ear: Tympanic membrane and ear canal normal.      Left Ear: Tympanic membrane and ear canal normal.      Nose: Nose normal. No congestion.      Mouth/Throat:      Mouth: Mucous membranes are moist.      Pharynx: Oropharynx is clear. No oropharyngeal exudate.   Eyes:      Extraocular Movements: Extraocular movements intact.      Conjunctiva/sclera: " Conjunctivae normal.      Pupils: Pupils are equal, round, and reactive to light.   Cardiovascular:      Rate and Rhythm: Normal rate and regular rhythm.      Pulses: Normal pulses. no weak pulses     Heart sounds: No murmur heard.  Pulmonary:      Effort: Pulmonary effort is normal.      Breath sounds: Normal breath sounds. No wheezing, rhonchi or rales.   Abdominal:      General: Bowel sounds are normal. There is no distension.      Palpations: Abdomen is soft.      Tenderness: There is no abdominal tenderness.   Musculoskeletal:         General: No swelling or tenderness. Normal range of motion.      Cervical back: Normal range of motion and neck supple.   Feet:      Right foot:      Skin integrity: No ulcer, skin breakdown, erythema, warmth, callus or dry skin.      Left foot:      Skin integrity: No ulcer, skin breakdown, erythema, warmth, callus or dry skin.   Skin:     General: Skin is warm and dry.      Capillary Refill: Capillary refill takes less than 2 seconds.   Neurological:      General: No focal deficit present.      Mental Status: He is alert and oriented to person, place, and time.      Cranial Nerves: No cranial nerve deficit.   Psychiatric:         Mood and Affect: Mood normal.         Behavior: Behavior normal.         Thought Content: Thought content normal.        Diabetic Foot Exam    Patient's shoes and socks removed.    Right Foot/Ankle   Right Foot Inspection  Skin Exam: skin normal and skin intact. No dry skin, no warmth, no callus, no erythema, no maceration, no abnormal color, no pre-ulcer, no ulcer and no callus.     Toe Exam: ROM and strength within normal limits.     Sensory   Monofilament testing: intact    Vascular  Capillary refills: < 3 seconds      Left Foot/Ankle  Left Foot Inspection  Skin Exam: skin normal and skin intact. No dry skin, no warmth, no erythema, no maceration, normal color, no pre-ulcer, no ulcer and no callus.     Toe Exam: ROM and strength within normal limits.      Sensory   Monofilament testing: intact    Vascular  Capillary refills: < 3 seconds      Assign Risk Category  No deformity present  No loss of protective sensation  No weak pulses  Risk: 0    DO KURT [facility]

## 2024-01-23 NOTE — TELEPHONE ENCOUNTER
Caller: Miah Rajan Wife     Doctor: Dr Rangel    Reason for call: Patient wife called to provide address for [facility] NPI#[id number] [address] [place] [address]    Call back#: [phone]

## 2024-01-23 NOTE — TELEPHONE ENCOUNTER
Adv pt no further update that this time. Adv pt will be contacted with further info when available

## 2024-01-23 NOTE — ASSESSMENT & PLAN NOTE
Lab Results   Component Value Date    HGBA1C 7.6 (H) 12/15/2023     Follows with endocrine who manages his diabetes

## 2024-01-23 NOTE — TELEPHONE ENCOUNTER
Caller: amira    Doctor: Juan Carlos    Reason for call: MRI without sedation needs a Prior auth   Scheduled for tomorrow at 2  [facility] [phone]    Call back#:

## 2024-01-23 NOTE — TELEPHONE ENCOUNTER
Alyson called in to see if there were any updates for authorization for pt to have MRI w/o sedation.

## 2024-01-23 NOTE — TELEPHONE ENCOUNTER
Caller: Alyson     Doctor: Juan Carlos    Reason for call: Alyson needs a copy of the two MRI orders with and without sedation for pt. PT is scheduled for the MRI's tomorrow 1/24/24. Please Fax : [phone]    Call back#: [phone]

## 2024-01-23 NOTE — ASSESSMENT & PLAN NOTE
Paperwork completed for work  He is a  but currently out of work due to severe back pain  Following with pain and spine  Was unable to get MRI done yesterday due to machine size  He will contact Dr. Marshlal office for further advisement  Plan for follow up here in 3 months or sooner if needed

## 2024-01-26 NOTE — TELEPHONE ENCOUNTER
S/w pt, stated that he had his mri done in [place] on Wednesday. Questioned if the report has been received. Advised pt, no report as of yet. Provided fax number and advised pt to have the report faxed to [facility] [place] for SL / MG's review. Pt verbalized understanding and appreciation.

## 2024-01-26 NOTE — TELEPHONE ENCOUNTER
Caller: patient    Doctor: dl    Reason for call: calling to verify if we received his MRI.  Not in the chart, he will be calling [employer]  Call back#:

## 2024-01-29 ENCOUNTER — TELEPHONE (OUTPATIENT)
Age: 34
End: 2024-01-29

## 2024-01-29 NOTE — TELEPHONE ENCOUNTER
Please remind him he will need to bring his disc to either neurosurgeon as it was done outside the network.

## 2024-01-29 NOTE — TELEPHONE ENCOUNTER
[facility]     Doctor:     Reason for call:  referred patient to them and they need records     Please fax all notes , procedure notes and imaging reports to them at: [phone]     Needed today     Call back#: [phone] opt [phone]

## 2024-01-29 NOTE — TELEPHONE ENCOUNTER
S/w pt, confirmed ov tomorrow, 1/30/2024 with Nitin Hernández. Advised pt, anticipate nitin will review the mri and discuss pain management options. Pt c/o pain in his b/l low back L>R and pain going down the front and outside of his L leg to the top of his foot with occasional shooting pain going down the front and outside of his R leg as well. Pt stated that he would like to see the neurosurgeon who can see him the fastest. Advised pt that this office would recommend Dr. Islas or Dr. Leon. Provided contact info. Advised pt to cb and advise of which surgeon he will be seeing as this office will forward his information. Pt verbalized understanding and appreciation. Confirmed fu ov tomorrow.

## 2024-01-29 NOTE — TELEPHONE ENCOUNTER
At this point patient has been uncomfortable for many periods of time.  He has severe spinal stenosis secondary to disc herniation at L3-4.  Where is his pain located?  At this point in time I recommend surgical evaluation and please also schedule follow-up with NP/PA

## 2024-01-30 ENCOUNTER — OFFICE VISIT (OUTPATIENT)
Dept: PAIN MEDICINE | Facility: CLINIC | Age: 34
End: 2024-01-30
Payer: COMMERCIAL

## 2024-01-30 VITALS
BODY MASS INDEX: 40.43 KG/M2 | HEIGHT: 74 IN | HEART RATE: 88 BPM | SYSTOLIC BLOOD PRESSURE: 138 MMHG | DIASTOLIC BLOOD PRESSURE: 84 MMHG | TEMPERATURE: 97.5 F | WEIGHT: 315 LBS

## 2024-01-30 DIAGNOSIS — M79.18 MYOFASCIAL PAIN SYNDROME: ICD-10-CM

## 2024-01-30 DIAGNOSIS — M54.16 LUMBAR RADICULOPATHY: Primary | ICD-10-CM

## 2024-01-30 DIAGNOSIS — M48.062 SPINAL STENOSIS OF LUMBAR REGION WITH NEUROGENIC CLAUDICATION: ICD-10-CM

## 2024-01-30 DIAGNOSIS — M51.26 LUMBAR DISC HERNIATION: ICD-10-CM

## 2024-01-30 PROCEDURE — 3079F DIAST BP 80-89 MM HG: CPT | Performed by: PHYSICIAN ASSISTANT

## 2024-01-30 PROCEDURE — 3075F SYST BP GE 130 - 139MM HG: CPT | Performed by: PHYSICIAN ASSISTANT

## 2024-01-30 PROCEDURE — 99214 OFFICE O/P EST MOD 30 MIN: CPT | Performed by: PHYSICIAN ASSISTANT

## 2024-01-30 RX ORDER — GABAPENTIN 400 MG/1
CAPSULE ORAL
Qty: 90 CAPSULE | Refills: 2 | Status: SHIPPED | OUTPATIENT
Start: 2024-01-30 | End: 2024-01-30 | Stop reason: SDUPTHER

## 2024-01-30 RX ORDER — TIZANIDINE 4 MG/1
4 TABLET ORAL 2 TIMES DAILY PRN
Qty: 60 TABLET | Refills: 2 | Status: SHIPPED | OUTPATIENT
Start: 2024-01-30

## 2024-01-30 RX ORDER — GABAPENTIN 400 MG/1
400 CAPSULE ORAL 3 TIMES DAILY
Qty: 90 CAPSULE | Refills: 2 | Status: SHIPPED | OUTPATIENT
Start: 2024-01-30

## 2024-01-30 NOTE — PROGRESS NOTES
Assessment:  1. Lumbar radiculopathy    2. Lumbar disc herniation    3. Spinal stenosis of lumbar region with neurogenic claudication    4. Myofascial pain syndrome        Plan:  While the patient was in the office today, I did have a thorough conversation regarding their chronic pain syndrome, medication management, and treatment plan options.    After discussing options, we are recommending that the patient proceed with the consultation with neurosurgery given the MRI findings in particular the severe central stenosis at the L3-4 region and concerns over his lower extremity weakness and ambulatory dysfunction.      In the meantime, we will proceed with medication modification to include discontinuing the baclofen and proceeding with tizanidine 4 mg twice daily as needed.  Will also increase the gabapentin to 400 mg 3 times daily.  I advised the patient that they should not drive or operate machinery while on this medication until they see how it affects them, as it could cause lethargy and mental cloudiness. I advised the patient to call our office if they experience any side effects or issues with the medication changes. The patient verbalized an understanding.    The patient will follow-up as needed for medication prescription refill and/or reevaluation. The patient was advised to contact the office should their symptoms worsen in the interim. The patient was agreeable and verbalized an understanding.        History of Present Illness:    The patient is a 33 y.o. male last seen on 12/13/2023 who presents for a follow up office visit in regards to chronic radicular low back pain secondary to lumbar degenerative disc disease, lumbar spondylosis and disc herniation with severe central stenosis at L3-4.   The patient currently reports persistent radicular symptoms primarily down the anterior aspect of the left lower extremity to the dorsum of the foot.  He rates his current pain score 9 out of 10 and describes it as  an intermittent burning, sharp, throbbing, cramping, pressure-like and shooting type pain with associated numbness and paresthesias.  He reports subjective weakness of both legs.  He presents today in wheelchair.  Since his initial evaluation with our office, he underwent the MRI of the lumbar spine which we reviewed today.  Patient states that he has had numerous epidural steroid injections done years ago with very temporary improvement.  At this point he states that he is going to move forward with the neurosurgical consultation.  Patient's last hemoglobin A1c in December 2023 was 7.6 down from 9.3 previously.    Current pain medications includes: Baclofen and gabapentin.  The patient reports that this regimen is providing 15% pain relief.  The patient is reporting no side effects from this pain medication regimen.    I have personally reviewed and/or updated the patient's past medical history, past surgical history, family history, social history, current medications, allergies, and vital signs today.       Review of Systems:    Review of Systems   Respiratory:  Negative for shortness of breath.    Cardiovascular:  Negative for chest pain.   Gastrointestinal:  Negative for constipation, diarrhea, nausea and vomiting.   Musculoskeletal:  Positive for gait problem. Negative for arthralgias, joint swelling and myalgias.   Skin:  Negative for rash.   Neurological:  Positive for weakness. Negative for dizziness and seizures.   All other systems reviewed and are negative.        Past Medical History:   Diagnosis Date   • Diabetes mellitus (HCC)    • GERD (gastroesophageal reflux disease)    • Herniated lumbar intervertebral disc        Past Surgical History:   Procedure Laterality Date   • ESOPHAGUS SURGERY      EGD with blood vessel clipping       Family History   Problem Relation Age of Onset   • Alcohol abuse Father    • Cirrhosis Father    • Hypertension Sister    • No Known Problems Son    • No Known Problems Son   "      Social History     Occupational History   • Occupation:  [employer]   Tobacco Use   • Smoking status: Former     Current packs/day: 1.50     Types: Cigarettes     Passive exposure: Never   • Smokeless tobacco: Former     Types: Chew   Vaping Use   • Vaping status: Former   • Substances: Nicotine   Substance and Sexual Activity   • Alcohol use: Yes     Comment: social   • Drug use: Not Currently   • Sexual activity: Not on file         Current Outpatient Medications:   •  atorvastatin (LIPITOR) 20 mg tablet, TAKE 1 TABLET BY MOUTH EVERY DAY, Disp: 90 tablet, Rfl: 3  •  Empagliflozin 25 MG TABS, Take 1 tablet (25 mg total) by mouth every morning, Disp: 30 tablet, Rfl: 6  •  gabapentin (NEURONTIN) 400 mg capsule, Take 1 capsule (400 mg total) by mouth 3 (three) times a day, Disp: 90 capsule, Rfl: 2  •  glimepiride (AMARYL) 2 mg tablet, TAKE 1 TABLET (2 MG TOTAL) BY MOUTH 2 (TWO) TIMES A DAY BEFORE MEALS, Disp: 180 tablet, Rfl: 2  •  tiZANidine (ZANAFLEX) 4 mg tablet, Take 1 tablet (4 mg total) by mouth 2 (two) times a day as needed for muscle spasms, Disp: 60 tablet, Rfl: 2  •  Blood Glucose Monitoring Suppl (Contour Next Gen Monitor) w/Device KIT, Use to test blood sugars twice a day, Disp: 1 kit, Rfl: 0  •  glucose blood (Contour Next Test) test strip, Use to test blood sugars twice a day, Disp: 100 each, Rfl: 7  •  Lancets Micro Thin 33G MISC, Use to test blood sugars twice a day, Disp: 100 each, Rfl: 7    Allergies   Allergen Reactions   • Metformin GI Intolerance       Physical Exam:    /84 (BP Location: Left arm, Patient Position: Sitting, Cuff Size: Large)   Pulse 88   Temp 97.5 °F (36.4 °C)   Ht 6' 2\" (1.88 m)   Wt (!) 170 kg (375 lb) Comment: per pt in wheelchair  BMI 48.15 kg/m²     Constitutional:normal, well developed, well nourished, alert, in no distress and non-toxic and no overt pain behavior. and obese  Eyes:anicteric  HEENT:grossly intact  Neck:supple, symmetric, " trachea midline and no masses   Pulmonary:even and unlabored  Cardiovascular:No edema or pitting edema present  Skin:Normal without rashes or lesions and well hydrated  Psychiatric:Mood and affect appropriate  Neurologic:Cranial Nerves II-XII grossly intact  Musculoskeletal:in wheelchair      Imaging    MRI LUMBAR SPINE 5/1/2023 @ [facility] IN [place]  T12-L1 through L4-5 disc desiccation with annular disc bulge. T12-L1, L1-2, and L2-3 mild-to-moderate central stenosis  L3-4 broad right paracentral herniation impinging upon the thecal sac with severe central stenosis and mild-to-moderate foraminal narrowing, right greater then left  L4-5 posterior annular fissure with annular disc bulge. Mild narrowing of the central canal and foramina. L5-S1 annular disc bulge

## 2024-01-30 NOTE — TELEPHONE ENCOUNTER
Caller: Venita barkley    Doctor: SAGE    Reason for call: patient is schedule for an MRI and before getting MRI done they need patient to get an H&m and EKG  Also would like the oder of MRI faxed to them    They will be faxing over an order sheet [phone]    FAX# [phone]    Call back#: [phone]

## 2024-01-30 NOTE — TELEPHONE ENCOUNTER
S/w pt, confirmed the pt is not having an MRI at [facility]. Advised pt that he will need to call and cancel that appt as additional testing is being ordered for that MRI. Pt verbalized understanding and appreciation. Stated that he will call now to cancel the MRI.

## 2024-02-05 ENCOUNTER — TELEPHONE (OUTPATIENT)
Dept: FAMILY MEDICINE CLINIC | Facility: CLINIC | Age: 34
End: 2024-02-05

## 2024-02-05 NOTE — TELEPHONE ENCOUNTER
Jackie called from Dr eLon's office.  Patient should be schedule for a microdiscectomy on 2/22/24.  They were asking if the physical patient had done 1/23 would be okay to use for clearance?  They are having patient get an EKG and blood work, then they will fax it to you.  I told her I would check with you and I can call her back.  [phone]  [phone]

## 2024-02-07 ENCOUNTER — HOSPITAL ENCOUNTER (OUTPATIENT)
Dept: RADIOLOGY | Facility: HOSPITAL | Age: 34
Discharge: HOME | End: 2024-02-07
Attending: ANESTHESIOLOGY

## 2024-02-07 LAB
HBA1C MFR BLD HPLC: 6.7 %
HBA1C MFR BLD HPLC: 6.7 %

## 2024-02-13 DIAGNOSIS — M54.16 LUMBAR RADICULOPATHY: ICD-10-CM

## 2024-02-13 RX ORDER — BACLOFEN 10 MG/1
10 TABLET ORAL 3 TIMES DAILY
Qty: 90 TABLET | Refills: 1 | OUTPATIENT
Start: 2024-02-13

## 2024-02-20 ENCOUNTER — TELEPHONE (OUTPATIENT)
Dept: FAMILY MEDICINE CLINIC | Facility: CLINIC | Age: 34
End: 2024-02-20

## 2024-02-20 NOTE — TELEPHONE ENCOUNTER
Jackie from Dr. Leon's office called regarding Miah's surgery on Thursday, 2/22.  Jackie stated that she received all the paperwork from Dr. Humphreys clearing him for surgery except that the office visit note from his appointment on 1/23 with Dr. Humphreys was not amended to state that he was clear for surgery.  Jackie is asking if one of the over providers can sign off so that Miah's surgery is not cancelled.    If there are any questions, Jackie can be reached at [phone]    Thank you

## 2024-04-10 ENCOUNTER — TELEPHONE (OUTPATIENT)
Dept: FAMILY MEDICINE CLINIC | Facility: CLINIC | Age: 34
End: 2024-04-10

## 2024-04-10 NOTE — TELEPHONE ENCOUNTER
Called Miah and left message to see if he needs his 391.41  Medication Form completed prior to his appt with Dr. Humphreys on 4/29.  Dr. Humphreys stated that if he does, than we will need to schedule an appt prior to the 4/29 visit.

## 2024-04-22 ENCOUNTER — OFFICE VISIT (OUTPATIENT)
Dept: FAMILY MEDICINE CLINIC | Facility: CLINIC | Age: 34
End: 2024-04-22

## 2024-04-22 VITALS
WEIGHT: 315 LBS | TEMPERATURE: 97.4 F | RESPIRATION RATE: 18 BRPM | HEIGHT: 74 IN | BODY MASS INDEX: 40.43 KG/M2 | DIASTOLIC BLOOD PRESSURE: 88 MMHG | SYSTOLIC BLOOD PRESSURE: 138 MMHG | OXYGEN SATURATION: 97 % | HEART RATE: 84 BPM

## 2024-04-22 DIAGNOSIS — M54.16 LUMBAR RADICULOPATHY: Primary | ICD-10-CM

## 2024-04-22 DIAGNOSIS — Z98.890 S/P LUMBAR MICRODISCECTOMY: ICD-10-CM

## 2024-04-22 PROCEDURE — 99214 OFFICE O/P EST MOD 30 MIN: CPT | Performed by: FAMILY MEDICINE

## 2024-04-22 NOTE — PROGRESS NOTES
Miah Rajan 1990 male MRN: [id number]    Family Medicine Follow-up Visit    ASSESSMENT/PLAN  Problem List Items Addressed This Visit          Nervous and Auditory    Lumbar radiculopathy - Primary       Surgery/Wound/Pain    S/P lumbar microdiscectomy       Miah was here today for follow up  He had been out of work due to his back pain and is now s/p microdiscectomy at [facility]  He has been release from his surgeon to return to work. I completed paperwork today stating he may return to work as well. Follow up as needed.          Future Appointments   Date Time Provider Department Center   4/29/2024  1:40 PM DO [facility]          SUBJECTIVE  CC: Follow-up (Forms to be filled out)      HPI:  Miah Rajan is a 33 y.o. male who presents for follow up for form.  Had surgery at [facility] on March 22  Had microdiscectomy via neurosurgery  Has been doing PT as well  Reports his symptoms are much improved from where he was 3 month ago        HPI    Review of Systems   Constitutional:  Negative for chills and fever.   HENT:  Negative for congestion, postnasal drip, rhinorrhea and sinus pain.    Eyes:  Negative for photophobia and visual disturbance.   Respiratory:  Negative for cough and shortness of breath.    Cardiovascular:  Negative for chest pain, palpitations and leg swelling.   Gastrointestinal:  Negative for abdominal pain, constipation, diarrhea, nausea and vomiting.   Genitourinary:  Negative for difficulty urinating and dysuria.   Musculoskeletal:  Negative for arthralgias and myalgias.   Skin:  Negative for rash.   Neurological:  Negative for dizziness and syncope.       Historical Information   The patient history was reviewed as follows:    Past Medical History:   Diagnosis Date    Diabetes mellitus (HCC)     GERD (gastroesophageal reflux disease)     Herniated lumbar intervertebral disc      Past Surgical History:   Procedure Laterality Date    ESOPHAGUS SURGERY      EGD with blood  "vessel clipping     Family History   Problem Relation Age of Onset    Alcohol abuse Father     Cirrhosis Father     Hypertension Sister     No Known Problems Son     No Known Problems Son       Social History   Social History     Substance and Sexual Activity   Alcohol Use Yes    Comment: social     Social History     Substance and Sexual Activity   Drug Use Not Currently     Social History     Tobacco Use   Smoking Status Former    Current packs/day: 1.50    Types: Cigarettes    Passive exposure: Never   Smokeless Tobacco Former    Types: Chew       Medications:     Current Outpatient Medications:     atorvastatin (LIPITOR) 20 mg tablet, TAKE 1 TABLET BY MOUTH EVERY DAY, Disp: 90 tablet, Rfl: 3    Blood Glucose Monitoring Suppl (Contour Next Gen Monitor) w/Device KIT, Use to test blood sugars twice a day, Disp: 1 kit, Rfl: 0    Empagliflozin 25 MG TABS, Take 1 tablet (25 mg total) by mouth every morning, Disp: 30 tablet, Rfl: 6    gabapentin (NEURONTIN) 400 mg capsule, Take 1 capsule (400 mg total) by mouth 3 (three) times a day, Disp: 90 capsule, Rfl: 2    glimepiride (AMARYL) 2 mg tablet, TAKE 1 TABLET (2 MG TOTAL) BY MOUTH 2 (TWO) TIMES A DAY BEFORE MEALS, Disp: 180 tablet, Rfl: 2    glucose blood (Contour Next Test) test strip, Use to test blood sugars twice a day, Disp: 100 each, Rfl: 7    Lancets Micro Thin 33G MISC, Use to test blood sugars twice a day, Disp: 100 each, Rfl: 7    tiZANidine (ZANAFLEX) 4 mg tablet, Take 1 tablet (4 mg total) by mouth 2 (two) times a day as needed for muscle spasms (Patient not taking: Reported on 4/22/2024), Disp: 60 tablet, Rfl: 2  Allergies   Allergen Reactions    Metformin GI Intolerance       OBJECTIVE    Vitals:   Vitals:    04/22/24 1110   BP: 138/88   BP Location: Left arm   Patient Position: Sitting   Cuff Size: Large   Pulse: 84   Resp: 18   Temp: (!) 97.4 °F (36.3 °C)   TempSrc: Tympanic   SpO2: 97%   Weight: (!) 173 kg (381 lb 12.8 oz)   Height: 6' 2\" (1.88 m) "           Physical Exam  Constitutional:       Appearance: He is well-developed.   HENT:      Head: Normocephalic and atraumatic.      Right Ear: External ear normal.      Left Ear: External ear normal.   Eyes:      Extraocular Movements: Extraocular movements intact.      Conjunctiva/sclera: Conjunctivae normal.   Cardiovascular:      Rate and Rhythm: Normal rate and regular rhythm.      Heart sounds: Normal heart sounds. No murmur heard.  Pulmonary:      Effort: Pulmonary effort is normal. No respiratory distress.      Breath sounds: Normal breath sounds. No wheezing.   Musculoskeletal:         General: Normal range of motion.      Cervical back: Normal range of motion and neck supple.   Skin:     General: Skin is warm and dry.   Neurological:      Mental Status: He is alert and oriented to person, place, and time.   Psychiatric:         Mood and Affect: Mood normal.         Behavior: Behavior normal.            Labs:        Eileen Humphreys DO    4/22/2024

## 2024-04-25 ENCOUNTER — TELEPHONE (OUTPATIENT)
Age: 34
End: 2024-04-25

## 2024-04-25 NOTE — TELEPHONE ENCOUNTER
Patient wife called (Alyson) stating patient handed in paperwork to employer that was filled out at last appointment and was given additional paperwork to be filled out.  The form is asking what job duties patient can perform.  Patient's wife is asking if form can be dropped of to be filled out.    Please advise    Thank you

## 2024-04-26 NOTE — TELEPHONE ENCOUNTER
Patient called to see if form was completed what wife dropped off to be completed.  Patient aware form is not completed yet.

## 2024-05-02 ENCOUNTER — VBI (OUTPATIENT)
Dept: ADMINISTRATIVE | Facility: OTHER | Age: 34
End: 2024-05-02

## 2024-10-15 DIAGNOSIS — E11.65 TYPE 2 DIABETES MELLITUS WITH HYPERGLYCEMIA, WITHOUT LONG-TERM CURRENT USE OF INSULIN (HCC): ICD-10-CM

## 2024-10-17 ENCOUNTER — TELEPHONE (OUTPATIENT)
Dept: ENDOCRINOLOGY | Facility: HOSPITAL | Age: 34
End: 2024-10-17

## 2024-10-17 NOTE — TELEPHONE ENCOUNTER
Concepcion Coughlin MA   to [facility]     10/16/24 11:18 AM  Please call patient to schedule an appointment. Last seen 10/2023        Left message for patient to call back to schedule.  Cannot refill any medications until he schedules an appointment.  New labs need to be ordered.  Last labs were done 2-7-24.  See 10-15-24 refill request.

## 2024-10-18 RX ORDER — GLIMEPIRIDE 2 MG/1
2 TABLET ORAL
Qty: 180 TABLET | Refills: 3 | Status: SHIPPED | OUTPATIENT
Start: 2024-10-18

## 2024-11-13 DIAGNOSIS — E11.65 TYPE 2 DIABETES MELLITUS WITH HYPERGLYCEMIA, WITHOUT LONG-TERM CURRENT USE OF INSULIN (HCC): ICD-10-CM

## 2024-11-13 RX ORDER — GLIMEPIRIDE 2 MG/1
2 TABLET ORAL
Qty: 180 TABLET | Refills: 0 | OUTPATIENT
Start: 2024-11-13

## 2024-11-13 NOTE — TELEPHONE ENCOUNTER
Pharmacy told patient there were no refills.    Reason for call:   [x] Refill   [] Prior Auth  [] Other:     Office:   [] PCP/Provider -   [x] Specialty/Provider - Endo    Medication:   - glimepiride (AMARYL) 2 mg tablet.  TAKE 1 TABLET (2 MG TOTAL) BY MOUTH 2 (TWO) TIMES A DAY BEFORE MEALS   Qty: 180    - Empagliflozin 25 MG TABS. Take 1 tablet (25 mg total) by mouth every morning   Qty: 30    Pharmacy: [facility]/pharmacy #[id number] [address]     Does the patient have enough for 3 days?   [] Yes   [x] No - Send as HP to POD

## 2024-11-13 NOTE — TELEPHONE ENCOUNTER
Please refuse, patient has not been seen since 10/23 and multiple attempts have been made to reach him to schedule follow up.

## 2024-12-06 ENCOUNTER — OFFICE VISIT (OUTPATIENT)
Dept: FAMILY MEDICINE CLINIC | Facility: CLINIC | Age: 34
End: 2024-12-06
Payer: COMMERCIAL

## 2024-12-06 VITALS
HEIGHT: 74 IN | HEART RATE: 90 BPM | WEIGHT: 313.2 LBS | OXYGEN SATURATION: 97 % | DIASTOLIC BLOOD PRESSURE: 84 MMHG | BODY MASS INDEX: 40.2 KG/M2 | RESPIRATION RATE: 18 BRPM | TEMPERATURE: 97.3 F | SYSTOLIC BLOOD PRESSURE: 122 MMHG

## 2024-12-06 DIAGNOSIS — F41.9 ANXIETY: Primary | ICD-10-CM

## 2024-12-06 DIAGNOSIS — E78.2 MIXED HYPERLIPIDEMIA: ICD-10-CM

## 2024-12-06 PROCEDURE — 99214 OFFICE O/P EST MOD 30 MIN: CPT

## 2024-12-06 RX ORDER — ATORVASTATIN CALCIUM 20 MG/1
20 TABLET, FILM COATED ORAL DAILY
Qty: 90 TABLET | Refills: 3 | Status: SHIPPED | OUTPATIENT
Start: 2024-12-06

## 2024-12-06 RX ORDER — ESCITALOPRAM OXALATE 10 MG/1
10 TABLET ORAL DAILY
Qty: 30 TABLET | Refills: 5 | Status: SHIPPED | OUTPATIENT
Start: 2024-12-06 | End: 2025-06-04

## 2024-12-06 NOTE — ASSESSMENT & PLAN NOTE
Improvement after beginning therapy with escitalopram 10 mg as ordered by previous provider. Refill today. Advised changing time of medication dosing d/t increased fatigue. Follow up in 6-8 weeks for recheck and annual physical.  CHARO-7 score: 13  Orders:    escitalopram (LEXAPRO) 10 mg tablet; Take 1 tablet (10 mg total) by mouth daily

## 2024-12-06 NOTE — ASSESSMENT & PLAN NOTE
Pt is agreeable to restarting atorvastatin 20 mg. Medication reordered today.   Orders:    atorvastatin (LIPITOR) 20 mg tablet; Take 1 tablet (20 mg total) by mouth daily

## 2024-12-06 NOTE — PROGRESS NOTES
Name: Miah Rajan      : 1990      MRN: [id number]  Encounter Provider: LEEANN Weber  Encounter Date: 2024   Encounter department: [facility]  :  Assessment & Plan  Anxiety  Improvement after beginning therapy with escitalopram 10 mg as ordered by previous provider. Refill today. Advised changing time of medication dosing d/t increased fatigue. Follow up in 6-8 weeks for recheck and annual physical.  CHARO-7 score: 13  Orders:    escitalopram (LEXAPRO) 10 mg tablet; Take 1 tablet (10 mg total) by mouth daily    Mixed hyperlipidemia  Pt is agreeable to restarting atorvastatin 20 mg. Medication reordered today.   Orders:    atorvastatin (LIPITOR) 20 mg tablet; Take 1 tablet (20 mg total) by mouth daily           History of Present Illness     Miah Rajan is a 34 y.o. year old male who presents today with a concern of anxiety. Reports he's been having increased anxiety and was vomiting every morning. His girlfriend suggested he start taking his old prescription of lexapro. He never took the lexapro at all when it was originally prescribed. He has been taking the medication for 1 month. He reports his anxiety and vomiting is better since starting the medication. He reports a history of vomiting as a child with increased anxiety at school.      Anxiety  Patient reports no chest pain, dizziness, nausea, nervous/anxious behavior, palpitations or shortness of breath.         Review of Systems   Constitutional: Negative.  Negative for chills, fatigue and fever.   HENT: Negative.  Negative for congestion, ear pain, rhinorrhea and sore throat.    Eyes: Negative.  Negative for pain and visual disturbance.   Respiratory: Negative.  Negative for cough and shortness of breath.    Cardiovascular: Negative.  Negative for chest pain, palpitations and leg swelling.   Gastrointestinal:  Negative for abdominal pain, constipation, diarrhea, nausea and vomiting.   Endocrine:  "Negative.    Genitourinary: Negative.  Negative for dysuria, frequency and urgency.   Musculoskeletal: Negative.  Negative for back pain and myalgias.   Skin: Negative.  Negative for rash.   Allergic/Immunologic: Negative.    Neurological: Negative.  Negative for dizziness, weakness, light-headedness and headaches.   Hematological: Negative.    Psychiatric/Behavioral: Negative.  The patient is not nervous/anxious.           Objective   /84 (BP Location: Left arm, Patient Position: Sitting, Cuff Size: Large)   Pulse 90   Temp (!) 97.3 °F (36.3 °C) (Tympanic)   Resp 18   Ht 6' 2\" (1.88 m)   Wt (!) 142 kg (313 lb 3.2 oz)   SpO2 97%   BMI 40.21 kg/m²      Physical Exam  Vitals and nursing note reviewed.   Constitutional:       General: He is not in acute distress.     Appearance: Normal appearance. He is morbidly obese. He is not ill-appearing.   HENT:      Head: Normocephalic and atraumatic.   Cardiovascular:      Rate and Rhythm: Normal rate and regular rhythm.      Heart sounds: Normal heart sounds. No murmur heard.  Pulmonary:      Effort: Pulmonary effort is normal. No respiratory distress.      Breath sounds: Normal breath sounds. No wheezing.   Musculoskeletal:         General: Normal range of motion.      Cervical back: Normal range of motion.   Skin:     General: Skin is warm and dry.      Capillary Refill: Capillary refill takes less than 2 seconds.   Neurological:      General: No focal deficit present.      Mental Status: He is alert and oriented to person, place, and time.   Psychiatric:         Attention and Perception: Attention normal.         Mood and Affect: Mood normal.         Speech: Speech normal.         Behavior: Behavior normal.         Thought Content: Thought content normal.         "

## 2024-12-18 ENCOUNTER — TELEPHONE (OUTPATIENT)
Age: 34
End: 2024-12-18

## 2024-12-18 NOTE — TELEPHONE ENCOUNTER
Patients significant other called to schedule appointment for patient.    Patient was scheduled with Dr Prather for 1/30/25.    Please order necessary labs for upcoming visit.    Please advise patient when labs are placed.

## 2024-12-22 DIAGNOSIS — I10 ESSENTIAL HYPERTENSION: ICD-10-CM

## 2024-12-22 DIAGNOSIS — E78.2 MIXED HYPERLIPIDEMIA: ICD-10-CM

## 2024-12-22 DIAGNOSIS — E11.65 TYPE 2 DIABETES MELLITUS WITH HYPERGLYCEMIA, WITHOUT LONG-TERM CURRENT USE OF INSULIN (HCC): Primary | ICD-10-CM

## 2024-12-26 ENCOUNTER — TELEPHONE (OUTPATIENT)
Age: 34
End: 2024-12-26

## 2024-12-26 NOTE — TELEPHONE ENCOUNTER
S/w pt, pt reports 5 days ago he was playing with his kids and lifted up his 100lb child and immediately felt pain in right lower back, pt has been using ice/heat with an old script of baclofen 2 x a day.      Nurse asked pt if he has been using tylenol or ibuprofen, pt stated no. Nurse advised pt if no medical contraindications he can take up to 3,000mg of tylenol in 24 hrs or 1,000mg of tylenol 3 x a day, alternating with ibuprofen not to exceed 2,400mg in 24 hours or 800mg 3 x a day.    Nurse advised pt he can also get over the counter lidocaine patches or over the counter pain creams to try as well.      Nurse advised pt nurse to discuss with SL and CB if SL has further recommendations, otherwise to continue with rec's above and to reach out in a few days if there are no improvements.  Pt verbalized understanding and appreciative of call.

## 2024-12-26 NOTE — TELEPHONE ENCOUNTER
Caller: rosalva Dooley     Doctor: Edgar    Reason for call: pt sated he hurt his back about 5 days ago.Pt has upcoming appt on 1/16/24. Pt would like to know what he can do for the pain until he sees Francie. Please Advise     Call back#: [phone]

## 2024-12-30 DIAGNOSIS — G89.29 CHRONIC BILATERAL LOW BACK PAIN WITHOUT SCIATICA: Primary | ICD-10-CM

## 2024-12-30 DIAGNOSIS — M54.50 CHRONIC BILATERAL LOW BACK PAIN WITHOUT SCIATICA: Primary | ICD-10-CM

## 2024-12-30 RX ORDER — METHYLPREDNISOLONE 4 MG/1
TABLET ORAL
Qty: 1 EACH | Refills: 0 | Status: SHIPPED | OUTPATIENT
Start: 2024-12-30

## 2024-12-30 NOTE — TELEPHONE ENCOUNTER
Medrol pack sent to [facility].  Patient diabetic, should check blood sugars as they may increase with steroid. Go to ER if pain keeps getting worse.    If MRI needed nitin will decide at ov

## 2024-12-30 NOTE — TELEPHONE ENCOUNTER
Caller: Wife Alyson    Doctor: Edgar    Reason for call: patients wife requesting MRI please advise     Call back#: [phone]

## 2024-12-30 NOTE — TELEPHONE ENCOUNTER
S/w pts wife, amira. Advised of above. Amira verbalized understanding and appreciation. Confirmed 1/16/25 ov with MG.

## 2024-12-30 NOTE — TELEPHONE ENCOUNTER
Alyson, calling back with update   Pain is lower right side of his back and wraps around R hip and buttocks. Pain level 7/10    Should she call the back surgeon?     Best contact [phone]

## 2024-12-30 NOTE — TELEPHONE ENCOUNTER
S/w amira per medical communication consent on file. Per Amira, pt is at work because he cannot miss time however, his pain has been significant x 9 days and appears to be getting worse. Difficulty walking, no loss of control of b/b. Per Amira, the pt's pain is in his low back, unsure of laterality but different than before and it is not running down his leg. Requesting an MRI. Advised Amira, it is unlikely that an MRI can be ordered at this time however the writer will d/w NM. Anticipate oral steroids will be sent to [facility] in [place] as requested. Take as directed. No NSAIDS with this medication; advil, aleve, ibuprofen, naproxen, motrin, etc. Tylenol is ok. Ok to continue baclofen and topical medications. Per Amira, pt stopped gabapentin some time ago. Advised Amira - do not resume at this time. Gabapentin is not an as needed medication. Advised Amira, the writer will cb if there is anything different or additional. Amira verbalized understanding and appreciation.

## 2025-01-02 DIAGNOSIS — F41.9 ANXIETY: ICD-10-CM

## 2025-01-03 RX ORDER — ESCITALOPRAM OXALATE 10 MG/1
10 TABLET ORAL DAILY
Qty: 90 TABLET | Refills: 1 | Status: SHIPPED | OUTPATIENT
Start: 2025-01-03 | End: 2025-01-10 | Stop reason: DRUGHIGH

## 2025-01-10 ENCOUNTER — OFFICE VISIT (OUTPATIENT)
Dept: FAMILY MEDICINE CLINIC | Facility: CLINIC | Age: 35
End: 2025-01-10
Payer: COMMERCIAL

## 2025-01-10 VITALS
DIASTOLIC BLOOD PRESSURE: 88 MMHG | WEIGHT: 315 LBS | RESPIRATION RATE: 18 BRPM | SYSTOLIC BLOOD PRESSURE: 120 MMHG | BODY MASS INDEX: 40.43 KG/M2 | OXYGEN SATURATION: 96 % | TEMPERATURE: 97.6 F | HEIGHT: 74 IN | HEART RATE: 103 BPM

## 2025-01-10 DIAGNOSIS — F41.9 ANXIETY: Primary | ICD-10-CM

## 2025-01-10 PROCEDURE — 99213 OFFICE O/P EST LOW 20 MIN: CPT

## 2025-01-10 RX ORDER — ESCITALOPRAM OXALATE 10 MG/1
15 TABLET ORAL DAILY
Qty: 45 TABLET | Refills: 5 | Status: SHIPPED | OUTPATIENT
Start: 2025-01-10 | End: 2025-07-09

## 2025-01-10 NOTE — ASSESSMENT & PLAN NOTE
Pt with some improvement on escitalopram 10 mg - morning vomiting has ceased. Continues with symptoms such as increased frustration and irritability. Pt agreeable to increasing dosage today. Escitalopram 15 mg sent to pharmacy. Pt counseled on SE and timeline to effectiveness. Follow up in 3 months for annual physical and recheck or sooner if needed. Declines printed AVS.  Orders:  •  escitalopram (LEXAPRO) 10 mg tablet; Take 1.5 tablets (15 mg total) by mouth daily

## 2025-01-10 NOTE — PROGRESS NOTES
"Name: Miah Rajan      : 1990      MRN: [id number]  Encounter Provider: LEEANN Weber  Encounter Date: 1/10/2025   Encounter department: [facility]  :  Assessment & Plan  Anxiety  Pt with some improvement on escitalopram 10 mg - morning vomiting has ceased. Continues with symptoms such as increased frustration and irritability. Pt agreeable to increasing dosage today. Escitalopram 15 mg sent to pharmacy. Pt counseled on SE and timeline to effectiveness. Follow up in 3 months for annual physical and recheck or sooner if needed. Declines printed AVS.  Orders:  •  escitalopram (LEXAPRO) 10 mg tablet; Take 1.5 tablets (15 mg total) by mouth daily           History of Present Illness {?Quick Links Encounters * My Last Note * Last Note in Specialty * Snapshot * Since Last Visit * History :56246}    Miah Rajan is a 34 y.o. year old male who presents today for follow up for anxiety. Reports he is doing well on the medication. He is not throwing up in the morning anymore. He was having some issues with anger. He catches himself getting frustrated and getting \"overwhelmed\". He is trying to vocalize his frustrations more rather than keeping them in. Feels there may be some room for improvement.         Review of Systems   Constitutional: Negative.  Negative for chills, fatigue and fever.   HENT: Negative.  Negative for congestion, ear pain, rhinorrhea and sore throat.    Eyes: Negative.  Negative for pain and visual disturbance.   Respiratory: Negative.  Negative for cough and shortness of breath.    Cardiovascular: Negative.  Negative for chest pain, palpitations and leg swelling.   Gastrointestinal:  Negative for abdominal pain, constipation, diarrhea, nausea and vomiting.   Endocrine: Negative.    Genitourinary: Negative.  Negative for dysuria, frequency and urgency.   Musculoskeletal: Negative.  Negative for back pain and myalgias.   Skin: Negative.  Negative for rash. " · Present on admission as evidenced by acute renal failure and SBPs > 180 mmHg  · Likely due to medication non-compliance; blood pressure has improved since admission  · Continue home Coreg 25 mg twice daily and Cardura 2 mg daily  · Continue Lasix 40mg TID  · Lisinopril discontinued this AM  · Continue to monitor blood pressure trends  · Nephrology following "  Allergic/Immunologic: Negative.    Neurological: Negative.  Negative for dizziness, weakness, light-headedness and headaches.   Hematological: Negative.    Psychiatric/Behavioral: Negative.         Objective {?Quick Links Trend Vitals * Enter New Vitals * Results Review * Timeline (Adult) * Labs * Imaging * Cardiology * Procedures * Lung Cancer Screening * Surgical eConsent :63860}  /88 (BP Location: Left arm, Patient Position: Sitting, Cuff Size: Large)   Pulse 103   Temp 97.6 °F (36.4 °C) (Tympanic)   Resp 18   Ht 6' 2\" (1.88 m)   Wt (!) 170 kg (374 lb 12.8 oz)   SpO2 96%   BMI 48.12 kg/m²      Physical Exam  Vitals and nursing note reviewed.   Constitutional:       General: He is not in acute distress.     Appearance: Normal appearance. He is morbidly obese. He is not ill-appearing.   HENT:      Head: Normocephalic and atraumatic.   Cardiovascular:      Rate and Rhythm: Normal rate and regular rhythm.      Heart sounds: Normal heart sounds. No murmur heard.  Pulmonary:      Effort: Pulmonary effort is normal. No respiratory distress.      Breath sounds: Normal breath sounds. No wheezing.   Musculoskeletal:         General: Normal range of motion.      Cervical back: Normal range of motion.   Skin:     General: Skin is warm and dry.      Capillary Refill: Capillary refill takes less than 2 seconds.   Neurological:      General: No focal deficit present.      Mental Status: He is alert and oriented to person, place, and time.   Psychiatric:         Mood and Affect: Mood normal.         Behavior: Behavior normal.         "

## 2025-01-16 ENCOUNTER — APPOINTMENT (OUTPATIENT)
Dept: RADIOLOGY | Facility: CLINIC | Age: 35
End: 2025-01-16
Payer: COMMERCIAL

## 2025-01-16 ENCOUNTER — OFFICE VISIT (OUTPATIENT)
Dept: PAIN MEDICINE | Facility: CLINIC | Age: 35
End: 2025-01-16
Payer: COMMERCIAL

## 2025-01-16 VITALS — HEART RATE: 85 BPM | HEIGHT: 74 IN | TEMPERATURE: 96.4 F | BODY MASS INDEX: 40.43 KG/M2 | WEIGHT: 315 LBS

## 2025-01-16 DIAGNOSIS — M54.16 LUMBAR RADICULOPATHY: ICD-10-CM

## 2025-01-16 DIAGNOSIS — Z98.890 S/P LUMBAR MICRODISCECTOMY: Primary | ICD-10-CM

## 2025-01-16 DIAGNOSIS — Z98.890 S/P LUMBAR MICRODISCECTOMY: ICD-10-CM

## 2025-01-16 PROCEDURE — 72114 X-RAY EXAM L-S SPINE BENDING: CPT

## 2025-01-16 PROCEDURE — 99214 OFFICE O/P EST MOD 30 MIN: CPT | Performed by: PHYSICIAN ASSISTANT

## 2025-01-16 RX ORDER — GABAPENTIN 300 MG/1
300 CAPSULE ORAL
Qty: 90 CAPSULE | Refills: 2 | Status: SHIPPED | OUTPATIENT
Start: 2025-01-16

## 2025-01-16 NOTE — PROGRESS NOTES
Assessment:  1. S/P lumbar microdiscectomy    2. Lumbar radiculopathy        Plan:  While the patient was in the office today, I did have a thorough conversation regarding their chronic pain syndrome, medication management, and treatment plan options.    After discussing options, I have placed an order for an x-ray of the lumbar spine.  I do believe an MRI of the lumbar spine is also medically reasonable at this point given his history of prior discectomy and resurgence of severe radicular pain.  Patient has been performing home exercises including stretching but with difficulty due to the pain.    Once he obtain the results from the studies we will contact him to discuss potential options.    In the meantime, initiate gabapentin, slowly titrating to 300 mg 3 times daily if tolerated.  Patient was educated on medications most common side effects which include dizziness, drowsiness, and swelling of the hands and feet.  Patient was instructed to call the office with any adverse medication side effects. The patient is also aware that if they would like to come off of the medication, they need to let us know as suddenly stopping the medication puts them at risk to have a seizure.    The patient was advised to contact the office should their symptoms worsen in the interim. The patient was agreeable and verbalized an understanding.        History of Present Illness:    The patient is a 34 y.o. male last seen on 1-30-24 who presents for a follow up office visit in regards to chronic pain secondary to lumbar disc disease with radiculopathy, status post lumbar microdiscectomy.  The patient currently reports significant low back pain with radiation into the buttocks and posterior thighs.  He rates his current pain a 4 out of 10 and describes it as a constant cramping, pressure-like and shooting type of pain with associated numbness.  Since we last saw the patient he underwent an L3-4 microdiscectomy at [facility].  Patient was  able to note moderate relief up until about 4 to 6 weeks ago.  Patient states that he was bending forward and then when he stood up he developed a severe sharp shooting and stabbing pain in the right low back preventing him from standing upright.  Patient had contacted our office at which point we called in a course of oral steroids.  Patient reports minimal relief with this.  He is struggling with daily living activities and has increased pain with bending, prolonged standing and walking.  He has been attempting lumbar home stretching exercises on a regular basis but with difficulty due to the amount of pain.    I have personally reviewed and/or updated the patient's past medical history, past surgical history, family history, social history, current medications, allergies, and vital signs today.       Review of Systems:    Review of Systems      Past Medical History:   Diagnosis Date   • Diabetes mellitus (HCC)    • GERD (gastroesophageal reflux disease)    • Herniated lumbar intervertebral disc        Past Surgical History:   Procedure Laterality Date   • ESOPHAGUS SURGERY      EGD with blood vessel clipping       Family History   Problem Relation Age of Onset   • Alcohol abuse Father    • Cirrhosis Father    • Hypertension Sister    • No Known Problems Son    • No Known Problems Son        Social History     Occupational History   • Occupation:  [employer]   Tobacco Use   • Smoking status: Former     Current packs/day: 1.50     Types: Cigarettes     Passive exposure: Never   • Smokeless tobacco: Former     Types: Chew   Vaping Use   • Vaping status: Former   • Substances: Nicotine   Substance and Sexual Activity   • Alcohol use: Yes     Comment: social   • Drug use: Not Currently   • Sexual activity: Not on file         Current Outpatient Medications:   •  atorvastatin (LIPITOR) 20 mg tablet, Take 1 tablet (20 mg total) by mouth daily, Disp: 90 tablet, Rfl: 3  •  Blood Glucose Monitoring  "Suppl (Contour Next Gen Monitor) w/Device KIT, Use to test blood sugars twice a day, Disp: 1 kit, Rfl: 0  •  escitalopram (LEXAPRO) 10 mg tablet, Take 1.5 tablets (15 mg total) by mouth daily, Disp: 45 tablet, Rfl: 5  •  gabapentin (NEURONTIN) 300 mg capsule, Take 1 capsule (300 mg total) by mouth daily at bedtime Take 1 tab QD x 3 days,  take 1 cap BID then 1 cap TID, Disp: 90 capsule, Rfl: 2  •  glucose blood (Contour Next Test) test strip, Use to test blood sugars twice a day, Disp: 100 each, Rfl: 7  •  Lancets Micro Thin 33G MISC, Use to test blood sugars twice a day, Disp: 100 each, Rfl: 7  •  Empagliflozin 25 MG TABS, Take 1 tablet (25 mg total) by mouth every morning (Patient not taking: Reported on 12/6/2024), Disp: 30 tablet, Rfl: 6  •  glimepiride (AMARYL) 2 mg tablet, TAKE 1 TABLET (2 MG TOTAL) BY MOUTH 2 (TWO) TIMES A DAY BEFORE MEALS (Patient not taking: Reported on 12/6/2024), Disp: 180 tablet, Rfl: 3    Allergies   Allergen Reactions   • Metformin GI Intolerance       Physical Exam:    Pulse 85   Temp (!) 96.4 °F (35.8 °C)   Ht 6' 2\" (1.88 m)   Wt (!) 168 kg (370 lb)   BMI 47.51 kg/m²     Constitutional:normal, well developed, well nourished, alert, in no distress and non-toxic and no overt pain behavior. and obese  Eyes:anicteric  HEENT:grossly intact  Neck:supple, symmetric, trachea midline and no masses   Pulmonary:even and unlabored  Cardiovascular:No edema or pitting edema present  Skin:Normal without rashes or lesions and well hydrated  Psychiatric:Mood and affect appropriate  Neurologic:Cranial Nerves II-XII grossly intact  Musculoskeletal: Gait is antalgic without the use of assistive devices, lumbar scar from prior surgery, limited range of motion and pain with forward flexion, positive straight leg raise test left side in seated position.      Imaging  MRI lumbar spine w wo contrast    (Results Pending)         Orders Placed This Encounter   Procedures   • MRI lumbar spine w wo contrast "   • XR spine lumbar complete w bending minimum 6 views

## 2025-01-17 ENCOUNTER — RESULTS FOLLOW-UP (OUTPATIENT)
Dept: PAIN MEDICINE | Facility: CLINIC | Age: 35
End: 2025-01-17

## 2025-01-17 NOTE — TELEPHONE ENCOUNTER
Left a detailed message on machine per medical communication consent on file advising of above. Provided cb number and office hours.

## 2025-01-17 NOTE — TELEPHONE ENCOUNTER
----- Message from Francie Hernández PA-C sent at 1/17/2025  8:24 AM EST -----  Please let patient know that his lumbar spine x-ray reveals arthritic changes.  Continue with the current plan.

## 2025-01-18 LAB
ALBUMIN SERPL-MCNC: 4.3 G/DL (ref 4.1–5.1)
ALBUMIN/CREAT UR: 16 MG/G CREAT (ref 0–29)
ALP SERPL-CCNC: 77 IU/L (ref 44–121)
ALT SERPL-CCNC: 21 IU/L (ref 0–44)
AST SERPL-CCNC: 17 IU/L (ref 0–40)
BASOPHILS # BLD AUTO: 0 X10E3/UL (ref 0–0.2)
BASOPHILS NFR BLD AUTO: 0 %
BILIRUB SERPL-MCNC: 0.4 MG/DL (ref 0–1.2)
BUN SERPL-MCNC: 18 MG/DL (ref 6–20)
BUN/CREAT SERPL: 15 (ref 9–20)
CALCIUM SERPL-MCNC: 9.7 MG/DL (ref 8.7–10.2)
CHLORIDE SERPL-SCNC: 103 MMOL/L (ref 96–106)
CHOLEST SERPL-MCNC: 182 MG/DL (ref 100–199)
CO2 SERPL-SCNC: 25 MMOL/L (ref 20–29)
CREAT SERPL-MCNC: 1.18 MG/DL (ref 0.76–1.27)
CREAT UR-MCNC: 117.2 MG/DL
EGFR: 84 ML/MIN/1.73
EOSINOPHIL # BLD AUTO: 0.2 X10E3/UL (ref 0–0.4)
EOSINOPHIL NFR BLD AUTO: 3 %
ERYTHROCYTE [DISTWIDTH] IN BLOOD BY AUTOMATED COUNT: 13.4 % (ref 11.6–15.4)
EST. AVERAGE GLUCOSE BLD GHB EST-MCNC: 174 MG/DL
GLOBULIN SER-MCNC: 2.5 G/DL (ref 1.5–4.5)
GLUCOSE SERPL-MCNC: 163 MG/DL (ref 70–99)
HBA1C MFR BLD: 7.7 % (ref 4.8–5.6)
HCT VFR BLD AUTO: 46.7 % (ref 37.5–51)
HDLC SERPL-MCNC: 31 MG/DL
HGB BLD-MCNC: 15.8 G/DL (ref 13–17.7)
IMM GRANULOCYTES # BLD: 0 X10E3/UL (ref 0–0.1)
IMM GRANULOCYTES NFR BLD: 0 %
LDLC SERPL CALC-MCNC: 129 MG/DL (ref 0–99)
LDLC/HDLC SERPL: 4.2 RATIO (ref 0–3.6)
LYMPHOCYTES # BLD AUTO: 1.6 X10E3/UL (ref 0.7–3.1)
LYMPHOCYTES NFR BLD AUTO: 21 %
MCH RBC QN AUTO: 28.5 PG (ref 26.6–33)
MCHC RBC AUTO-ENTMCNC: 33.8 G/DL (ref 31.5–35.7)
MCV RBC AUTO: 84 FL (ref 79–97)
MICROALBUMIN UR-MCNC: 18.7 UG/ML
MONOCYTES # BLD AUTO: 0.5 X10E3/UL (ref 0.1–0.9)
MONOCYTES NFR BLD AUTO: 7 %
NEUTROPHILS # BLD AUTO: 5.3 X10E3/UL (ref 1.4–7)
NEUTROPHILS NFR BLD AUTO: 69 %
PLATELET # BLD AUTO: 172 X10E3/UL (ref 150–450)
POTASSIUM SERPL-SCNC: 4.6 MMOL/L (ref 3.5–5.2)
PROT SERPL-MCNC: 6.8 G/DL (ref 6–8.5)
RBC # BLD AUTO: 5.55 X10E6/UL (ref 4.14–5.8)
SL AMB VLDL CHOLESTEROL CALC: 22 MG/DL (ref 5–40)
SODIUM SERPL-SCNC: 140 MMOL/L (ref 134–144)
TRIGL SERPL-MCNC: 119 MG/DL (ref 0–149)
TSH SERPL DL<=0.005 MIU/L-ACNC: 0.53 UIU/ML (ref 0.45–4.5)
WBC # BLD AUTO: 7.6 X10E3/UL (ref 3.4–10.8)

## 2025-01-19 ENCOUNTER — RESULTS FOLLOW-UP (OUTPATIENT)
Dept: ENDOCRINOLOGY | Facility: HOSPITAL | Age: 35
End: 2025-01-19

## 2025-01-20 NOTE — RESULT ENCOUNTER NOTE
Attempted contact w/ pt to assess whether or not prev vm was rec w/ detailed msg of results. RN advised if any questions to ro to office. CB # and OH provided.

## 2025-01-30 ENCOUNTER — OFFICE VISIT (OUTPATIENT)
Dept: ENDOCRINOLOGY | Facility: HOSPITAL | Age: 35
End: 2025-01-30
Payer: COMMERCIAL

## 2025-01-30 VITALS
BODY MASS INDEX: 40.43 KG/M2 | WEIGHT: 315 LBS | SYSTOLIC BLOOD PRESSURE: 126 MMHG | OXYGEN SATURATION: 95 % | DIASTOLIC BLOOD PRESSURE: 70 MMHG | HEIGHT: 74 IN | HEART RATE: 82 BPM

## 2025-01-30 DIAGNOSIS — E11.65 TYPE 2 DIABETES MELLITUS WITH HYPERGLYCEMIA, WITHOUT LONG-TERM CURRENT USE OF INSULIN (HCC): Primary | ICD-10-CM

## 2025-01-30 DIAGNOSIS — E78.2 MIXED HYPERLIPIDEMIA: ICD-10-CM

## 2025-01-30 DIAGNOSIS — E04.1 THYROID NODULE: ICD-10-CM

## 2025-01-30 PROCEDURE — 99214 OFFICE O/P EST MOD 30 MIN: CPT | Performed by: INTERNAL MEDICINE

## 2025-01-30 RX ORDER — GLIMEPIRIDE 2 MG/1
2 TABLET ORAL
Qty: 90 TABLET | Refills: 3 | Status: SHIPPED | OUTPATIENT
Start: 2025-01-30

## 2025-01-30 NOTE — PATIENT INSTRUCTIONS
Hgba1c is 7.7%. thi sis not bad.     Restart jardiance 25 mg daily.     Use glimepiride 2 mg daily as needed for days eating more.     Work on checking sugars more often, daily.     Continue to work on diet and regular activity.     Eye doctor visit for diabetic eye exam.     We'll order thyroid ultrasound to see if the thyroid has a nodule.     Follow up in 6 months with blood work.

## 2025-01-30 NOTE — PROGRESS NOTES
2/2/2025    Assessment & Plan      Diagnoses and all orders for this visit:    Type 2 diabetes mellitus with hyperglycemia, without long-term current use of insulin (HCC)  -     Empagliflozin 25 MG TABS; Take 1 tablet (25 mg total) by mouth every morning  -     glimepiride (AMARYL) 2 mg tablet; Take 1 tablet (2 mg total) by mouth daily with breakfast As needed  -     Comprehensive metabolic panel; Future  -     Hemoglobin A1C; Future  -     Lipid Panel with Direct LDL reflex; Future  -     Comprehensive metabolic panel  -     Hemoglobin A1C  -     Lipid Panel with Direct LDL reflex    Mixed hyperlipidemia  -     Comprehensive metabolic panel; Future  -     Hemoglobin A1C; Future  -     Lipid Panel with Direct LDL reflex; Future  -     Comprehensive metabolic panel  -     Hemoglobin A1C  -     Lipid Panel with Direct LDL reflex    Thyroid nodule  -     US thyroid; Future          Assessment & Plan  1. Type 2 diabetes mellitus.  His hemoglobin A1c level has increased from the 6s to 7.7, indicating a need for improved glycemic control. He is advised to monitor his blood glucose levels more frequently and to maintain a balanced diet and regular physical activity. A consultation with an ophthalmologist for a diabetic eye examination is recommended. He will resume Jardiance 25 mg daily. Glimepiride 2 mg daily will be used as needed on days when he consumes more food. Blood work has been ordered for 6 months from now.    2. Hyperlipidemia.  Cholesterol levels are not too bad. He will continue taking atorvastatin 20 mg daily.    3. Thyroid nodule.  Thyroid hormone levels are normal, but the thyroid is enlarged on the left side with a 2 to 3 cm nodule palpable. A thyroid ultrasound has been ordered to evaluate the presence of a nodule. If a nodule is detected, further evaluation will be necessary.    Follow-up  The patient will follow up in 6 months with preceding hemoglobin A1c, CMP, and lipid panel.        CC: Diabetes  type II, lipid follow-up    History of Present Illness    HPI: Miah Rajan is a 34-year-old male with type 2 diabetes diagnosed 4 years ago, hypertension, and hyperlipidemia, here for a follow-up visit.     He has no diabetes complications as he reports no neuropathy, nephropathy, retinopathy, heart attack, stroke, or claudication.    He has been off his diabetes medication for several months, having previously been on Jardiance 25 mg daily and glimepiride 2 mg, which he was supposed to take twice daily but only took once. He reports no polyuria or polydipsia and does not adhere to a diabetic diet, consuming approximately 2 cans of Coke daily. His fluid intake includes at least a gallon of water daily. He works as a  and works 13 to 14 hours a day. He does not exercise. His weight has remained stable around 370 pounds for several years.     He reports no blurry vision and has not consulted an ophthalmologist since his diabetes diagnosis.     He also reports no peripheral neuropathy or foot ulcers and does not see a podiatrist.     He is currently on atorvastatin 20 mg daily for cholesterol management. He reports no chest pain or dyspnea.     He had previously attempted intermittent fasting without a specific plan, which resulted in blood glucose levels around 80. He is not a regular breakfast eater and typically consumes lunch at work.    Blood Sugar/Glucometer/Pump/CGM review: He monitors his blood glucose levels once or twice weekly, typically before dinner, with readings averaging around 150. A few weeks ago, he recorded a reading of 300.      Historical Information   Past Medical History:   Diagnosis Date    Diabetes mellitus (HCC)     GERD (gastroesophageal reflux disease)     Herniated lumbar intervertebral disc      Past Surgical History:   Procedure Laterality Date    ESOPHAGUS SURGERY      EGD with blood vessel clipping     Social History   Social History     Substance and Sexual Activity  "  Alcohol Use Yes    Comment: social     Social History     Substance and Sexual Activity   Drug Use Not Currently     Social History     Tobacco Use   Smoking Status Former    Current packs/day: 1.50    Types: Cigarettes    Passive exposure: Never   Smokeless Tobacco Former    Types: Chew     Family History:   Family History   Problem Relation Age of Onset    Alcohol abuse Father     Cirrhosis Father     Hypertension Sister     No Known Problems Son     No Known Problems Son        Meds/Allergies   Current Outpatient Medications   Medication Sig Dispense Refill    atorvastatin (LIPITOR) 20 mg tablet Take 1 tablet (20 mg total) by mouth daily 90 tablet 3    Blood Glucose Monitoring Suppl (Contour Next Gen Monitor) w/Device KIT Use to test blood sugars twice a day 1 kit 0    Empagliflozin 25 MG TABS Take 1 tablet (25 mg total) by mouth every morning 90 tablet 3    gabapentin (NEURONTIN) 300 mg capsule Take 1 capsule (300 mg total) by mouth daily at bedtime Take 1 tab QD x 3 days,  take 1 cap BID then 1 cap TID 90 capsule 2    glimepiride (AMARYL) 2 mg tablet Take 1 tablet (2 mg total) by mouth daily with breakfast As needed 90 tablet 3    glucose blood (Contour Next Test) test strip Use to test blood sugars twice a day 100 each 7    Lancets Micro Thin 33G MISC Use to test blood sugars twice a day 100 each 7    escitalopram (LEXAPRO) 10 mg tablet TAKE 1 AND 1/2 TABLETS DAILY BY MOUTH 135 tablet 2     No current facility-administered medications for this visit.     Allergies   Allergen Reactions    Metformin GI Intolerance       Objective   Vitals: Blood pressure 126/70, pulse 82, height 6' 2\" (1.88 m), weight (!) 168 kg (370 lb), SpO2 95%.  Invasive Devices       None                   Physical Exam    The left side of the thyroid is enlarged and nodular, with a 2 to 3 cm nodule felt in the left lobe. No lymphadenopathy. Right lobe without nodular feelings.  Lungs are clear to auscultation.  Heart has a regular rate " and rhythm. No murmurs detected.  No edema in the lower extremities.      The history was obtained from the review of the chart and from the patient.    Lab Results:    Most recent Alc is  Lab Results   Component Value Date    HGBA1C 7.7 (H) 01/17/2025           Blood work performed on 1/17/2025 showed a CMP with a glucose of 163 fasting but was otherwise normal.    Urine microalbumin to creatinine ratio was 16.    CBC is normal.    Lab Results   Component Value Date    CREATININE 1.18 01/17/2025    CREATININE 1.13 12/15/2023    CREATININE 1.10 11/27/2023    BUN 18 01/17/2025    K 4.6 01/17/2025     01/17/2025    CO2 25 01/17/2025     eGFR   Date Value Ref Range Status   01/17/2025 84 >59 mL/min/1.73 Final   11/27/2023 88 ml/min/1.73sq m Final     Total cholesterol 182, LDL cholesterol 129.    Lab Results   Component Value Date    HDL 31 (L) 01/17/2025    TRIG 119 01/17/2025    CHOLHDL 5.1 (H) 12/15/2023       Lab Results   Component Value Date    ALT 21 01/17/2025    AST 17 01/17/2025       Lab Results   Component Value Date    TSH 0.529 01/17/2025             Future Appointments   Date Time Provider Department Center   2/5/2025  6:45 AM [facility] MRI 1 [facility]   7/31/2025  2:00 PM Sharon Prather MD [facility]

## 2025-02-01 DIAGNOSIS — F41.9 ANXIETY: ICD-10-CM

## 2025-02-02 RX ORDER — ESCITALOPRAM OXALATE 10 MG/1
TABLET ORAL
Qty: 135 TABLET | Refills: 2 | Status: SHIPPED | OUTPATIENT
Start: 2025-02-02

## 2025-02-05 ENCOUNTER — HOSPITAL ENCOUNTER (OUTPATIENT)
Dept: MRI IMAGING | Facility: HOSPITAL | Age: 35
Discharge: HOME | End: 2025-02-05
Payer: COMMERCIAL

## 2025-02-05 DIAGNOSIS — M54.16 LUMBAR RADICULOPATHY: ICD-10-CM

## 2025-02-05 DIAGNOSIS — Z98.890 S/P LUMBAR MICRODISCECTOMY: ICD-10-CM

## 2025-02-05 PROCEDURE — 72158 MRI LUMBAR SPINE W/O & W/DYE: CPT

## 2025-02-05 PROCEDURE — A9585 GADOBUTROL INJECTION: HCPCS | Performed by: PHYSICIAN ASSISTANT

## 2025-02-05 RX ORDER — GADOBUTROL 604.72 MG/ML
16 INJECTION INTRAVENOUS
Status: COMPLETED | OUTPATIENT
Start: 2025-02-05 | End: 2025-02-05

## 2025-02-05 RX ADMIN — GADOBUTROL 16 ML: 604.72 INJECTION INTRAVENOUS at 07:42

## 2025-02-07 NOTE — TELEPHONE ENCOUNTER
----- Message from Francie Hernández PA-C sent at 2/5/2025 12:57 PM EST -----  Please let patient know his lumbar spine MRI shows degenerative disc disease with moderate stenosis at the L2-3;  there are postoperative changes at L3-4 with scar tissue.  Patient can either come to office to discuss injections if interested or he c  an follow up with his neurosurgeon.

## 2025-02-07 NOTE — TELEPHONE ENCOUNTER
S/w pt and advised of same. Pt verbalized understanding and appreciation.    OVS scheduled to discuss.

## 2025-02-17 ENCOUNTER — HOSPITAL ENCOUNTER (OUTPATIENT)
Dept: ULTRASOUND IMAGING | Facility: HOSPITAL | Age: 35
Discharge: HOME | End: 2025-02-17
Attending: INTERNAL MEDICINE
Payer: COMMERCIAL

## 2025-02-17 DIAGNOSIS — E04.1 THYROID NODULE: ICD-10-CM

## 2025-02-17 PROCEDURE — 76536 US EXAM OF HEAD AND NECK: CPT

## 2025-03-10 ENCOUNTER — TELEPHONE (OUTPATIENT)
Age: 35
End: 2025-03-10

## 2025-03-10 DIAGNOSIS — E04.1 THYROID NODULE: Primary | ICD-10-CM

## 2025-03-10 NOTE — TELEPHONE ENCOUNTER
I have not sent him a [employer] message. It might have been sent by radiology since his ultrasound shows 4 thyroid nodules, 2 of which meet criteria for biopsy, 1 on the right and 1 on the left.     I will order the biopsy of the thyroid nodules.

## 2025-03-10 NOTE — TELEPHONE ENCOUNTER
Patient's wife called stating patient was told he needs to have a thyroid biopsy. Asking if this has been scheduled. States he was sent a [email or web address] message. For some reason, I do not see a message in chart. Please advise, thank you.

## 2025-04-01 ENCOUNTER — HOSPITAL ENCOUNTER (OUTPATIENT)
Dept: ULTRASOUND IMAGING | Facility: HOSPITAL | Age: 35
Discharge: HOME | End: 2025-04-01
Attending: INTERNAL MEDICINE
Payer: COMMERCIAL

## 2025-04-01 DIAGNOSIS — E04.1 THYROID NODULE: ICD-10-CM

## 2025-04-01 PROCEDURE — 88173 CYTOPATH EVAL FNA REPORT: CPT | Performed by: PATHOLOGY

## 2025-04-01 PROCEDURE — 10006 FNA BX W/US GDN EA ADDL: CPT

## 2025-04-01 PROCEDURE — 10005 FNA BX W/US GDN 1ST LES: CPT

## 2025-04-01 PROCEDURE — 88172 CYTP DX EVAL FNA 1ST EA SITE: CPT | Performed by: PATHOLOGY

## 2025-04-01 RX ORDER — LIDOCAINE HYDROCHLORIDE 10 MG/ML
5 INJECTION, SOLUTION EPIDURAL; INFILTRATION; INTRACAUDAL; PERINEURAL ONCE
Status: COMPLETED | OUTPATIENT
Start: 2025-04-01 | End: 2025-04-01

## 2025-04-01 RX ADMIN — LIDOCAINE HYDROCHLORIDE 5 ML: 10 INJECTION, SOLUTION EPIDURAL; INFILTRATION; INTRACAUDAL; PERINEURAL at 14:31

## 2025-04-03 PROCEDURE — 88172 CYTP DX EVAL FNA 1ST EA SITE: CPT | Performed by: PATHOLOGY

## 2025-04-03 PROCEDURE — 88173 CYTOPATH EVAL FNA REPORT: CPT | Performed by: PATHOLOGY

## 2025-04-04 ENCOUNTER — RESULTS FOLLOW-UP (OUTPATIENT)
Dept: ENDOCRINOLOGY | Facility: HOSPITAL | Age: 35
End: 2025-04-04

## 2025-06-04 ENCOUNTER — APPOINTMENT (EMERGENCY)
Dept: CT IMAGING | Facility: HOSPITAL | Age: 35
End: 2025-06-04

## 2025-06-04 ENCOUNTER — APPOINTMENT (EMERGENCY)
Dept: RADIOLOGY | Facility: HOSPITAL | Age: 35
End: 2025-06-04

## 2025-06-04 ENCOUNTER — HOSPITAL ENCOUNTER (EMERGENCY)
Facility: HOSPITAL | Age: 35
Discharge: HOME | End: 2025-06-04
Attending: EMERGENCY MEDICINE

## 2025-06-04 VITALS
TEMPERATURE: 98.2 F | RESPIRATION RATE: 16 BRPM | OXYGEN SATURATION: 98 % | DIASTOLIC BLOOD PRESSURE: 76 MMHG | HEART RATE: 66 BPM | SYSTOLIC BLOOD PRESSURE: 122 MMHG

## 2025-06-04 DIAGNOSIS — G43.909 MIGRAINE HEADACHE: Primary | ICD-10-CM

## 2025-06-04 LAB
ALBUMIN SERPL BCG-MCNC: 4.1 G/DL (ref 3.5–5)
ALP SERPL-CCNC: 56 U/L (ref 34–104)
ALT SERPL W P-5'-P-CCNC: 20 U/L (ref 7–52)
ANION GAP SERPL CALCULATED.3IONS-SCNC: 9 MMOL/L (ref 4–13)
AST SERPL W P-5'-P-CCNC: 17 U/L (ref 13–39)
BASOPHILS # BLD AUTO: 0.02 THOUSANDS/ÂΜL (ref 0–0.1)
BASOPHILS NFR BLD AUTO: 0 % (ref 0–1)
BILIRUB SERPL-MCNC: 0.7 MG/DL (ref 0.2–1)
BUN SERPL-MCNC: 14 MG/DL (ref 5–25)
CALCIUM SERPL-MCNC: 9.1 MG/DL (ref 8.4–10.2)
CARDIAC TROPONIN I PNL SERPL HS: <2 NG/L (ref ?–50)
CARDIAC TROPONIN I PNL SERPL HS: <2 NG/L (ref ?–50)
CHLORIDE SERPL-SCNC: 104 MMOL/L (ref 96–108)
CO2 SERPL-SCNC: 25 MMOL/L (ref 21–32)
CREAT SERPL-MCNC: 1.14 MG/DL (ref 0.6–1.3)
EOSINOPHIL # BLD AUTO: 0.07 THOUSAND/ÂΜL (ref 0–0.61)
EOSINOPHIL NFR BLD AUTO: 1 % (ref 0–6)
ERYTHROCYTE [DISTWIDTH] IN BLOOD BY AUTOMATED COUNT: 13.2 % (ref 11.6–15.1)
GFR SERPL CREATININE-BSD FRML MDRD: 84 ML/MIN/1.73SQ M
GLUCOSE SERPL-MCNC: 132 MG/DL (ref 65–140)
HCT VFR BLD AUTO: 48 % (ref 36.5–49.3)
HGB BLD-MCNC: 15.8 G/DL (ref 12–17)
IMM GRANULOCYTES # BLD AUTO: 0.04 THOUSAND/UL (ref 0–0.2)
IMM GRANULOCYTES NFR BLD AUTO: 1 % (ref 0–2)
LYMPHOCYTES # BLD AUTO: 1.66 THOUSANDS/ÂΜL (ref 0.6–4.47)
LYMPHOCYTES NFR BLD AUTO: 21 % (ref 14–44)
MAGNESIUM SERPL-MCNC: 1.9 MG/DL (ref 1.9–2.7)
MCH RBC QN AUTO: 27.5 PG (ref 26.8–34.3)
MCHC RBC AUTO-ENTMCNC: 32.9 G/DL (ref 31.4–37.4)
MCV RBC AUTO: 84 FL (ref 82–98)
MONOCYTES # BLD AUTO: 0.5 THOUSAND/ÂΜL (ref 0.17–1.22)
MONOCYTES NFR BLD AUTO: 6 % (ref 4–12)
NEUTROPHILS # BLD AUTO: 5.8 THOUSANDS/ÂΜL (ref 1.85–7.62)
NEUTS SEG NFR BLD AUTO: 71 % (ref 43–75)
NRBC BLD AUTO-RTO: 0 /100 WBCS
PLATELET # BLD AUTO: 220 THOUSANDS/UL (ref 149–390)
PMV BLD AUTO: 10.4 FL (ref 8.9–12.7)
POTASSIUM SERPL-SCNC: 3.6 MMOL/L (ref 3.5–5.3)
PROT SERPL-MCNC: 7.3 G/DL (ref 6.4–8.4)
RBC # BLD AUTO: 5.75 MILLION/UL (ref 3.88–5.62)
SODIUM SERPL-SCNC: 138 MMOL/L (ref 135–147)
WBC # BLD AUTO: 8.09 THOUSAND/UL (ref 4.31–10.16)

## 2025-06-04 PROCEDURE — 70496 CT ANGIOGRAPHY HEAD: CPT

## 2025-06-04 PROCEDURE — 93005 ELECTROCARDIOGRAM TRACING: CPT

## 2025-06-04 PROCEDURE — 99285 EMERGENCY DEPT VISIT HI MDM: CPT | Performed by: EMERGENCY MEDICINE

## 2025-06-04 PROCEDURE — 83735 ASSAY OF MAGNESIUM: CPT | Performed by: EMERGENCY MEDICINE

## 2025-06-04 PROCEDURE — 96375 TX/PRO/DX INJ NEW DRUG ADDON: CPT

## 2025-06-04 PROCEDURE — 85025 COMPLETE CBC W/AUTO DIFF WBC: CPT | Performed by: EMERGENCY MEDICINE

## 2025-06-04 PROCEDURE — 71045 X-RAY EXAM CHEST 1 VIEW: CPT

## 2025-06-04 PROCEDURE — 70498 CT ANGIOGRAPHY NECK: CPT

## 2025-06-04 PROCEDURE — 99284 EMERGENCY DEPT VISIT MOD MDM: CPT

## 2025-06-04 PROCEDURE — 80053 COMPREHEN METABOLIC PANEL: CPT | Performed by: EMERGENCY MEDICINE

## 2025-06-04 PROCEDURE — 36415 COLL VENOUS BLD VENIPUNCTURE: CPT | Performed by: EMERGENCY MEDICINE

## 2025-06-04 PROCEDURE — 84484 ASSAY OF TROPONIN QUANT: CPT | Performed by: EMERGENCY MEDICINE

## 2025-06-04 PROCEDURE — 96365 THER/PROPH/DIAG IV INF INIT: CPT

## 2025-06-04 RX ORDER — KETOROLAC TROMETHAMINE 30 MG/ML
15 INJECTION, SOLUTION INTRAMUSCULAR; INTRAVENOUS ONCE
Status: COMPLETED | OUTPATIENT
Start: 2025-06-04 | End: 2025-06-04

## 2025-06-04 RX ORDER — IBUPROFEN 600 MG/1
600 TABLET, FILM COATED ORAL EVERY 6 HOURS PRN
Qty: 30 TABLET | Refills: 0 | Status: SHIPPED | OUTPATIENT
Start: 2025-06-04

## 2025-06-04 RX ORDER — DIPHENHYDRAMINE HCL 50 MG
50 CAPSULE ORAL EVERY 6 HOURS PRN
Qty: 30 CAPSULE | Refills: 0 | Status: SHIPPED | OUTPATIENT
Start: 2025-06-04

## 2025-06-04 RX ORDER — METOCLOPRAMIDE HYDROCHLORIDE 5 MG/ML
10 INJECTION INTRAMUSCULAR; INTRAVENOUS ONCE
Status: COMPLETED | OUTPATIENT
Start: 2025-06-04 | End: 2025-06-04

## 2025-06-04 RX ORDER — METOCLOPRAMIDE 10 MG/1
10 TABLET ORAL EVERY 6 HOURS PRN
Qty: 30 TABLET | Refills: 0 | Status: SHIPPED | OUTPATIENT
Start: 2025-06-04

## 2025-06-04 RX ORDER — MAGNESIUM SULFATE HEPTAHYDRATE 40 MG/ML
2 INJECTION, SOLUTION INTRAVENOUS ONCE
Status: DISCONTINUED | OUTPATIENT
Start: 2025-06-04 | End: 2025-06-04

## 2025-06-04 RX ORDER — DIPHENHYDRAMINE HYDROCHLORIDE 50 MG/ML
25 INJECTION, SOLUTION INTRAMUSCULAR; INTRAVENOUS ONCE
Status: COMPLETED | OUTPATIENT
Start: 2025-06-04 | End: 2025-06-04

## 2025-06-04 RX ORDER — MAGNESIUM SULFATE HEPTAHYDRATE 40 MG/ML
2 INJECTION, SOLUTION INTRAVENOUS ONCE
Status: COMPLETED | OUTPATIENT
Start: 2025-06-04 | End: 2025-06-04

## 2025-06-04 RX ORDER — ACETAMINOPHEN 10 MG/ML
1000 INJECTION, SOLUTION INTRAVENOUS ONCE
Status: COMPLETED | OUTPATIENT
Start: 2025-06-04 | End: 2025-06-04

## 2025-06-04 RX ADMIN — MAGNESIUM SULFATE HEPTAHYDRATE 2 G: 40 INJECTION, SOLUTION INTRAVENOUS at 18:22

## 2025-06-04 RX ADMIN — SODIUM CHLORIDE 1000 ML: 0.9 INJECTION, SOLUTION INTRAVENOUS at 17:35

## 2025-06-04 RX ADMIN — METOCLOPRAMIDE 10 MG: 5 INJECTION, SOLUTION INTRAMUSCULAR; INTRAVENOUS at 17:36

## 2025-06-04 RX ADMIN — DIPHENHYDRAMINE HYDROCHLORIDE 25 MG: 50 INJECTION, SOLUTION INTRAMUSCULAR; INTRAVENOUS at 17:35

## 2025-06-04 RX ADMIN — ACETAMINOPHEN 1000 MG: 10 INJECTION INTRAVENOUS at 17:59

## 2025-06-04 RX ADMIN — KETOROLAC TROMETHAMINE 15 MG: 30 INJECTION, SOLUTION INTRAMUSCULAR; INTRAVENOUS at 19:57

## 2025-06-04 RX ADMIN — IOHEXOL 85 ML: 350 INJECTION, SOLUTION INTRAVENOUS at 19:13

## 2025-06-04 NOTE — ED PROVIDER NOTES
Time reflects when diagnosis was documented in both MDM as applicable and the Disposition within this note       Time User Action Codes Description Comment    6/4/2025  7:52 PM Omar Overtonmeena Add [G43.909] Migraine headache           ED Disposition       ED Disposition   Discharge    Condition   Stable    Date/Time   Wed Jun 4, 2025  7:52 PM    Comment   Miah Rajan discharge to home/self care.                   Assessment & Plan       Medical Decision Making  Obtain blood work, CTA head/neck, EKG, chest x-ray  Give IV fluids, urine culture continue to monitor patient for any worsening symptoms    Lab and radiology studies were unremarkable.  Patient felt significantly better with almost complete resolution of headache after migraine cocktail given.  No signs of aneurysm noted on CT scan or any other acute findings.  At this time patient symptoms are consistent with migraine headache.  Patient placed on medication for migraine and discharged home with follow-up to PCP as well as neurology.  Close return instructions given to return to the ER for any worsening symptoms.  Patient agrees with discharge plan.  Patient well appearing at time of discharge.    Please Note: Fluency Direct voice recognition software may have been used in the creation of this document. Wrong words or sound a like substitutions may have occurred due to the inherent limitations of the voice software.         Amount and/or Complexity of Data Reviewed  External Data Reviewed: ECG.  Labs: ordered. Decision-making details documented in ED Course.  Radiology: ordered. Decision-making details documented in ED Course.  ECG/medicine tests: ordered and independent interpretation performed. Decision-making details documented in ED Course.    Risk  Prescription drug management.        ED Course as of 06/04/25 2012 Wed Jun 04, 2025 1951 Patient reexamined at bedside.  Patient is feeling better.  Patient will be discharged.  I went over lab and  radiology results with patient.       Medications   sodium chloride 0.9 % bolus 1,000 mL (0 mL Intravenous Stopped 6/4/25 1835)   metoclopramide (REGLAN) injection 10 mg (10 mg Intravenous Given 6/4/25 1736)   diphenhydrAMINE (BENADRYL) injection 25 mg (25 mg Intravenous Given 6/4/25 1735)   acetaminophen (Ofirmev) injection 1,000 mg (0 mg Intravenous Stopped 6/4/25 1814)   magnesium sulfate 2 g/50 mL IVPB (premix) 2 g (0 g Intravenous Stopped 6/4/25 1922)   iohexol (OMNIPAQUE) 350 MG/ML injection (MULTI-DOSE) 85 mL (85 mL Intravenous Given 6/4/25 1913)   ketorolac (TORADOL) injection 15 mg (15 mg Intravenous Given 6/4/25 1957)       ED Risk Strat Scores                    No data recorded        SBIRT 20yo+      Flowsheet Row Most Recent Value   Initial Alcohol Screen: US AUDIT-C     1. How often do you have a drink containing alcohol? 0 Filed at: 06/04/2025 1712   2. How many drinks containing alcohol do you have on a typical day you are drinking?  0 Filed at: 06/04/2025 1712   3a. Male UNDER 65: How often do you have five or more drinks on one occasion? 0 Filed at: 06/04/2025 1712   3b. FEMALE Any Age, or MALE 65+: How often do you have 4 or more drinks on one occassion? 0 Filed at: 06/04/2025 1712   Audit-C Score 0 Filed at: 06/04/2025 1712   CIERRA: How many times in the past year have you...    Used an illegal drug or used a prescription medication for non-medical reasons? Never Filed at: 06/04/2025 1712                            History of Present Illness       Chief Complaint   Patient presents with    Headache     Pt reports having a migraine on Monday morning, and then took a nap. Experienced another migraine today around 5am. Reports vomiting. Does not currently have a headache.        Past Medical History[1]   Past Surgical History[2]   Family History[3]   Social History[4]   E-Cigarette/Vaping    E-Cigarette Use Former User       E-Cigarette/Vaping Substances    Nicotine Yes     THC No     CBD No      Flavoring No     Other No     Unknown No       I have reviewed and agree with the history as documented.     35-year-old male with past history of morbid obesity, GERD, type 2 diabetes, herniated lumbar intervertebral disc, presents to the ED for evaluation of intermittent migraine headaches over the past 3 days.  Patient states that he usually does not get migraine.  He had a very bad migraine headache with photophobia, nausea, and vomiting about a year ago.  Since then he has been doing okay until 6/2/2025 when he had another headache with associated nausea and vomiting.  Patient took some over-the-counter medication and had some relief.  Patient was fine until last night when headache came back again and persisted throughout the day today.  Patient states that he is concerned because his father had a cerebral aneurysm.  Subsequently patient came to the ED for further evaluation.  Patient currently denies any focal neurodeficits.  Patient was never formally diagnosed with migraine.      History provided by:  Patient  Headache  Associated symptoms: no abdominal pain, no back pain, no cough, no ear pain, no eye pain, no fever, no seizures, no sore throat and no vomiting        Review of Systems   Constitutional:  Negative for chills and fever.   HENT:  Negative for ear pain and sore throat.    Eyes:  Negative for pain and visual disturbance.   Respiratory:  Negative for cough and shortness of breath.    Cardiovascular:  Negative for chest pain and palpitations.   Gastrointestinal:  Negative for abdominal pain and vomiting.   Genitourinary:  Negative for dysuria and hematuria.   Musculoskeletal:  Negative for arthralgias and back pain.   Skin:  Negative for color change and rash.   Neurological:  Positive for headaches. Negative for seizures and syncope.   All other systems reviewed and are negative.          Objective       ED Triage Vitals   Temperature Pulse Blood Pressure Respirations SpO2 Patient Position -  Orthostatic VS   06/04/25 1711 06/04/25 1711 06/04/25 1711 06/04/25 1711 06/04/25 1711 06/04/25 1711   98.2 °F (36.8 °C) 95 163/94 20 98 % Sitting      Temp Source Heart Rate Source BP Location FiO2 (%) Pain Score    06/04/25 1711 06/04/25 1711 06/04/25 1711 -- 06/04/25 1957    Temporal Monitor Left arm  1      Vitals      Date and Time Temp Pulse SpO2 Resp BP Pain Score FACES Pain Rating User   06/04/25 1959 -- 66 98 % 16 122/76 -- -- AC   06/04/25 1957 -- -- -- -- -- 1 -- AC   06/04/25 1900 -- 66 98 % 13 122/69 -- -- RN   06/04/25 1805 -- 72 94 % 19 128/61 -- -- RN   06/04/25 1711 98.2 °F (36.8 °C) 95 98 % 20 163/94 -- -- HR            Physical Exam  Vitals and nursing note reviewed.   Constitutional:       General: He is not in acute distress.     Appearance: He is well-developed.   HENT:      Head: Normocephalic and atraumatic.     Eyes:      Conjunctiva/sclera: Conjunctivae normal.       Cardiovascular:      Rate and Rhythm: Normal rate and regular rhythm.      Heart sounds: No murmur heard.  Pulmonary:      Effort: Pulmonary effort is normal. No respiratory distress.      Breath sounds: Normal breath sounds.   Abdominal:      Palpations: Abdomen is soft.      Tenderness: There is no abdominal tenderness.     Musculoskeletal:         General: No swelling.      Cervical back: Neck supple.     Skin:     General: Skin is warm and dry.      Capillary Refill: Capillary refill takes less than 2 seconds.     Neurological:      General: No focal deficit present.      Mental Status: He is alert and oriented to person, place, and time.     Psychiatric:         Mood and Affect: Mood normal.         Results Reviewed       Procedure Component Value Units Date/Time    HS Troponin I 2hr [[id number]] Collected: 06/04/25 1947    Lab Status: In process Specimen: Blood from Arm, Right Updated: 06/04/25 1950    HS Troponin I 4hr [[id number]]     Lab Status: No result Specimen: Blood     HS Troponin 0hr (reflex protocol) [[id number]]   (Normal) Collected: 06/04/25 1733    Lab Status: Final result Specimen: Blood from Arm, Right Updated: 06/04/25 1809     hs TnI 0hr <2 ng/L     Comprehensive metabolic panel [[id number]] Collected: 06/04/25 1733    Lab Status: Final result Specimen: Blood from Arm, Right Updated: 06/04/25 1805     Sodium 138 mmol/L      Potassium 3.6 mmol/L      Chloride 104 mmol/L      CO2 25 mmol/L      ANION GAP 9 mmol/L      BUN 14 mg/dL      Creatinine 1.14 mg/dL      Glucose 132 mg/dL      Calcium 9.1 mg/dL      AST 17 U/L      ALT 20 U/L      Alkaline Phosphatase 56 U/L      Total Protein 7.3 g/dL      Albumin 4.1 g/dL      Total Bilirubin 0.70 mg/dL      eGFR 84 ml/min/1.73sq m     Narrative:      National Kid[employer] guidelines for Chronic Kidney Disease (CKD):     Stage 1 with normal or high GFR (GFR > 90 mL/min/1.73 square meters)    Stage 2 Mild CKD (GFR = 60-89 mL/min/1.73 square meters)    Stage 3A Moderate CKD (GFR = 45-59 mL/min/1.73 square meters)    Stage 3B Moderate CKD (GFR = 30-44 mL/min/1.73 square meters)    Stage 4 Severe CKD (GFR = 15-29 mL/min/1.73 square meters)    Stage 5 End Stage CKD (GFR <15 mL/min/1.73 square meters)  Note: GFR calculation is accurate only with a steady state creatinine    Magnesium [[id number]]  (Normal) Collected: 06/04/25 1733    Lab Status: Final result Specimen: Blood from Arm, Right Updated: 06/04/25 1805     Magnesium 1.9 mg/dL     CBC and differential [[id number]]  (Abnormal) Collected: 06/04/25 1733    Lab Status: Final result Specimen: Blood from Arm, Right Updated: 06/04/25 1747     WBC 8.09 Thousand/uL      RBC 5.75 Million/uL      Hemoglobin 15.8 g/dL      Hematocrit 48.0 %      MCV 84 fL      MCH 27.5 pg      MCHC 32.9 g/dL      RDW 13.2 %      MPV 10.4 fL      Platelets 220 Thousands/uL      nRBC 0 /100 WBCs      Segmented % 71 %      Immature Grans % 1 %      Lymphocytes % 21 %      Monocytes % 6 %      Eosinophils Relative 1 %      Basophils Relative 0 %       Absolute Neutrophils 5.80 Thousands/µL      Absolute Immature Grans 0.04 Thousand/uL      Absolute Lymphocytes 1.66 Thousands/µL      Absolute Monocytes 0.50 Thousand/µL      Eosinophils Absolute 0.07 Thousand/µL      Basophils Absolute 0.02 Thousands/µL     UA w Reflex to Microscopic w Reflex to Culture [[id number]]     Lab Status: No result Specimen: Urine             CTA head and neck with and without contrast   Final Interpretation by Ivan Martinez MD (06/04 1940)         1. No intracranial hemorrhage, mass or mass effect.   2. No intracranial aneurysm. No stenosis, dissection or occlusion of the carotid or vertebral arteries or major vessels of the [place].                  Workstation performed: [id number]         XR chest 1 view portable    (Results Pending)       ECG 12 Lead Documentation Only    Date/Time: 6/4/2025 5:44 PM    Performed by: Elizabeth Overton DO  Authorized by: Elizabeth Overton DO    Indications / Diagnosis:  Headache  ECG reviewed by me, the ED Provider: yes    Patient location:  ED  Previous ECG:     Previous ECG:  Compared to current    Similarity:  No change    Comparison to cardiac monitor: Yes    Interpretation:     Interpretation: normal    Comments:      Sinus rhythm, rate 74, normal axis, normal intervals, no acute ST/T wave abnormalities noted, otherwise unremarkable EKG, unchanged from previous study.      ED Medication and Procedure Management   Prior to Admission Medications   Prescriptions Last Dose Informant Patient Reported? Taking?   Blood Glucose Monitoring Suppl (Contour Next Gen Monitor) w/Device KIT  Self No No   Sig: Use to test blood sugars twice a day   Empagliflozin 25 MG TABS   No No   Sig: Take 1 tablet (25 mg total) by mouth every morning   Lancets Micro Thin 33G MISC  Self No No   Sig: Use to test blood sugars twice a day   atorvastatin (LIPITOR) 20 mg tablet  Self No No   Sig: Take 1 tablet (20 mg total) by mouth daily   escitalopram (LEXAPRO)  10 mg tablet   No No   Sig: TAKE 1 AND 1/2 TABLETS DAILY BY MOUTH   gabapentin (NEURONTIN) 300 mg capsule   No No   Sig: Take 1 capsule (300 mg total) by mouth daily at bedtime Take 1 tab QD x 3 days,  take 1 cap BID then 1 cap TID   glimepiride (AMARYL) 2 mg tablet   No No   Sig: Take 1 tablet (2 mg total) by mouth daily with breakfast As needed   glucose blood (Contour Next Test) test strip  Self No No   Sig: Use to test blood sugars twice a day      Facility-Administered Medications: None     Patient's Medications   Discharge Prescriptions    DIPHENHYDRAMINE (BENADRYL) 50 MG CAPSULE    Take 1 capsule (50 mg total) by mouth every 6 (six) hours as needed (headache)       Start Date: 6/4/2025  End Date: --       Order Dose: 50 mg       Quantity: 30 capsule    Refills: 0    IBUPROFEN (MOTRIN) 600 MG TABLET    Take 1 tablet (600 mg total) by mouth every 6 (six) hours as needed for headaches       Start Date: 6/4/2025  End Date: --       Order Dose: 600 mg       Quantity: 30 tablet    Refills: 0    METOCLOPRAMIDE (REGLAN) 10 MG TABLET    Take 1 tablet (10 mg total) by mouth every 6 (six) hours as needed (headache)       Start Date: 6/4/2025  End Date: --       Order Dose: 10 mg       Quantity: 30 tablet    Refills: 0     No discharge procedures on file.  ED SEPSIS DOCUMENTATION   Time reflects when diagnosis was documented in both MDM as applicable and the Disposition within this note       Time User Action Codes Description Comment    6/4/2025  7:52 PM Elizabeth Overton Add [G43.909] Migraine headache                    Elizabeth Overton DO  06/04/25 2011         [1]   Past Medical History:  Diagnosis Date    Diabetes mellitus (HCC)     GERD (gastroesophageal reflux disease)     Herniated lumbar intervertebral disc    [2]   Past Surgical History:  Procedure Laterality Date    ESOPHAGUS SURGERY      EGD with blood vessel clipping    US GUIDED THYROID BIOPSY  4/1/2025   [3]   Family History  Problem Relation Name Age of  Onset    Alcohol abuse Father      Cirrhosis Father      Hypertension Sister leona     No Known Problems Son baljit     No Known Problems Son alisha    [4]   Social History  Tobacco Use    Smoking status: Former     Current packs/day: 1.50     Types: Cigarettes     Passive exposure: Never    Smokeless tobacco: Former     Types: Chew   Vaping Use    Vaping status: Former    Substances: Nicotine   Substance Use Topics    Alcohol use: Yes     Comment: social    Drug use: Not Currently        Elizabeth Overton DO  06/04/25 1063

## 2025-06-06 LAB
ATRIAL RATE: 74 BPM
P AXIS: 48 DEGREES
PR INTERVAL: 160 MS
QRS AXIS: 23 DEGREES
QRSD INTERVAL: 84 MS
QT INTERVAL: 394 MS
QTC INTERVAL: 437 MS
T WAVE AXIS: 50 DEGREES
VENTRICULAR RATE: 74 BPM

## 2025-06-06 PROCEDURE — 93010 ELECTROCARDIOGRAM REPORT: CPT | Performed by: INTERNAL MEDICINE

## 2025-07-10 DIAGNOSIS — F41.9 ANXIETY: ICD-10-CM

## 2025-07-11 RX ORDER — ESCITALOPRAM OXALATE 10 MG/1
15 TABLET ORAL DAILY
Qty: 45 TABLET | Refills: 0 | Status: SHIPPED | OUTPATIENT
Start: 2025-07-11

## 2025-07-11 NOTE — TELEPHONE ENCOUNTER
Patient needs an appointment. Please contact the patient to schedule an appointment. Last office visit: 1/10/25    Pt was to come back in 3 months for annual